# Patient Record
Sex: FEMALE | Race: WHITE | HISPANIC OR LATINO | Employment: OTHER | URBAN - METROPOLITAN AREA
[De-identification: names, ages, dates, MRNs, and addresses within clinical notes are randomized per-mention and may not be internally consistent; named-entity substitution may affect disease eponyms.]

---

## 2018-03-27 PROBLEM — G43.109 MIGRAINE WITH AURA AND WITHOUT STATUS MIGRAINOSUS, NOT INTRACTABLE: Status: ACTIVE | Noted: 2018-03-27

## 2018-05-01 PROBLEM — K20.0 EOSINOPHILIC ESOPHAGITIS: Status: ACTIVE | Noted: 2018-05-01

## 2018-05-10 PROBLEM — I95.9 SYMPTOMATIC HYPOTENSION: Status: RESOLVED | Noted: 2018-05-10 | Resolved: 2018-05-10

## 2018-05-10 PROBLEM — I95.0 IDIOPATHIC HYPOTENSION: Status: ACTIVE | Noted: 2018-05-10

## 2018-05-10 PROBLEM — H70.92 MASTOIDITIS OF LEFT SIDE: Status: ACTIVE | Noted: 2018-05-10

## 2018-05-10 PROBLEM — I95.9 SYMPTOMATIC HYPOTENSION: Status: ACTIVE | Noted: 2018-05-10

## 2018-05-10 PROBLEM — G45.1 TIA INVOLVING CAROTID ARTERY: Status: ACTIVE | Noted: 2018-05-10

## 2018-12-19 ENCOUNTER — OFFICE VISIT (OUTPATIENT)
Dept: OBSTETRICS AND GYNECOLOGY | Facility: CLINIC | Age: 63
End: 2018-12-19
Payer: MEDICARE

## 2018-12-19 VITALS
DIASTOLIC BLOOD PRESSURE: 94 MMHG | SYSTOLIC BLOOD PRESSURE: 168 MMHG | BODY MASS INDEX: 21.24 KG/M2 | HEIGHT: 70 IN | WEIGHT: 148.38 LBS

## 2018-12-19 DIAGNOSIS — R10.2 PELVIC PAIN: Primary | ICD-10-CM

## 2018-12-19 PROCEDURE — 99204 OFFICE O/P NEW MOD 45 MIN: CPT | Mod: S$GLB,,, | Performed by: SPECIALIST

## 2018-12-19 PROCEDURE — 99999 PR PBB SHADOW E&M-EST. PATIENT-LVL III: CPT | Mod: PBBFAC,,, | Performed by: SPECIALIST

## 2018-12-19 PROCEDURE — 3008F BODY MASS INDEX DOCD: CPT | Mod: CPTII,S$GLB,, | Performed by: SPECIALIST

## 2018-12-19 NOTE — PROGRESS NOTES
64 yo WF presents with complaint intermittent pelvic pain, left sided which she states has been present approx 2 years. Pt denies f/c, n/v, vaginal discharge, dysuria, flank pain, vaginal bleeding or spotting, melena, c/d.  Past Medical History:   Diagnosis Date    Abnormal bone density screening     Degenerative joint disease 02/05/2007    History of chicken pox     History of depression 02/05/2007       Past Surgical History:   Procedure Laterality Date    APPENDECTOMY      BREAST SURGERY Bilateral 2005    Implants    CHOLECYSTECTOMY  06/2012    DENTAL SURGERY      DILATION AND CURETTAGE OF UTERUS      HIP SURGERY Right 2007    TONSILLECTOMY      WRIST SURGERY Left 01/2014       Family History   Problem Relation Age of Onset    Diabetes Mother     Cancer Father     Throat cancer Father     Breast cancer Neg Hx     Ovarian cancer Neg Hx        Social History     Socioeconomic History    Marital status:      Spouse name: None    Number of children: None    Years of education: None    Highest education level: None   Social Needs    Financial resource strain: None    Food insecurity - worry: None    Food insecurity - inability: None    Transportation needs - medical: None    Transportation needs - non-medical: None   Occupational History    None   Tobacco Use    Smoking status: Never Smoker    Smokeless tobacco: Never Used   Substance and Sexual Activity    Alcohol use: Yes     Comment: occ    Drug use: No    Sexual activity: Yes     Birth control/protection: Post-menopausal   Other Topics Concern    None   Social History Narrative    None       Current Outpatient Medications   Medication Sig Dispense Refill    ALPRAZolam (XANAX) 0.25 MG tablet Take 0.25 mg by mouth as needed.      cyanocobalamin (VITAMIN B-12) 1,000 mcg/mL injection 1,000 mcg once a week.      ergocalciferol (ERGOCALCIFEROL) 50,000 unit Cap Take 1 capsule (50,000 Units total) by mouth every 7 days. 12  capsule 0    hydrocodone-acetaminophen 7.5-325mg (NORCO) 7.5-325 mg per tablet Take 1 tablet by mouth 3 (three) times daily.      prochlorperazine (COMPAZINE) 25 MG suppository Place 1 suppository (25 mg total) rectally every 12 (twelve) hours as needed for Nausea. 12 suppository 0    prochlorperazine (COMPAZINE) 5 MG tablet Take 5 mg by mouth every 6 (six) hours.      rizatriptan (MAXALT) 10 MG tablet Take 1 tablet (10 mg total) by mouth once as needed for Migraine. May repeat dose once after 2 hours 20 tablet 0    sod picosulf-mag ox-citric ac (CLENPIQ) 10 mg-3.5 gram -12 gram/160 mL Soln Clenpiq 10 mg-3.5 gram-12 gram/160 mL oral solution   take as directed      tiZANidine (ZANAFLEX) 4 MG tablet Take 4 mg by mouth 2 (two) times daily as needed.       No current facility-administered medications for this visit.        Review of patient's allergies indicates:  No Known Allergies    Review of System:   General: no chills, fever, night sweats, weight gain or weight loss  Psychological: no depression or suicidal ideation  Breasts: no new or changing breast lumps, nipple discharge or masses.  Respiratory: no cough, shortness of breath, or wheezing  Cardiovascular: no chest pain or dyspnea on exertion  Gastrointestinal: no abdominal pain, change in bowel habits, or black or bloody stools  Genito-Urinary: no incontinence, urinary frequency/urgency or vulvar/vaginal symptoms,  or abnormal vaginal bleeding. POSITIVE PELVIC PAIN  Musculoskeletal: no gait disturbance or muscular weakness                                              General Appearance    A and O x 4, Cooperative, no distress       Abdomen     Soft, non-tender, bowel sounds active all four quadrants,  no masses, no organomegaly    Genitourinary:   External rectal exam shows no thrombosed external hemorrhoids.   Pelvic exam was performed with patient supine.  No labial fusion.  There is no rash, lesion or injury on the right labia.  There is no rash,  lesion or injury on the left labia.  No bleeding and no signs of injury around the vaginal introitus, urethra is without lesions and well supported. The cervix is visualized with no discharge, lesions or friability.  No vaginal discharge found.  No significant Cystocele, Enterocele or rectocele, and uterus well supported.  Bimanual exam:  The urethra is normal to palpation and there are no palpable vaginal wall masses.  Uterus is not deviated, not enlarged, not fixed, normal shape and not tender.  Cervix exhibits no motion tenderness.   Right adnexum displays no mass and no tenderness.  Left adnexum fullness and tender to deep palp   Extremities: Extremities normal, atraumatic, no cyanosis or edema                     I discussed possible fibroid vs adnexal etiology and recommend a pelvic u/s  I will order and review  Further care plan pendingresults

## 2019-01-02 ENCOUNTER — HOSPITAL ENCOUNTER (OUTPATIENT)
Dept: RADIOLOGY | Facility: HOSPITAL | Age: 64
Discharge: HOME | End: 2019-01-02
Attending: SPECIALIST
Payer: MEDICARE

## 2019-01-02 DIAGNOSIS — R10.2 PELVIC PAIN: ICD-10-CM

## 2019-01-02 PROCEDURE — 76830 TRANSVAGINAL US NON-OB: CPT | Mod: TC,PN

## 2019-01-02 PROCEDURE — 76830 TRANSVAGINAL US NON-OB: CPT | Mod: 26,,, | Performed by: RADIOLOGY

## 2019-01-02 PROCEDURE — 76830 US PELVIS COMP WITH TRANSVAG NON-OB (XPD): ICD-10-PCS | Mod: 26,,, | Performed by: RADIOLOGY

## 2019-01-02 PROCEDURE — 76856 US PELVIS COMP WITH TRANSVAG NON-OB (XPD): ICD-10-PCS | Mod: 26,,, | Performed by: RADIOLOGY

## 2019-01-02 PROCEDURE — 76856 US EXAM PELVIC COMPLETE: CPT | Mod: 26,,, | Performed by: RADIOLOGY

## 2019-04-11 PROBLEM — M51.9 LUMBAR DISC DISEASE: Status: ACTIVE | Noted: 2019-04-11

## 2019-04-11 PROBLEM — T14.91XA SUICIDE ATTEMPT: Status: ACTIVE | Noted: 2019-04-11

## 2020-02-27 ENCOUNTER — APPOINTMENT (OUTPATIENT)
Dept: LAB | Facility: HOSPITAL | Age: 65
End: 2020-02-27
Attending: FAMILY MEDICINE
Payer: MEDICARE

## 2020-02-27 ENCOUNTER — CLINICAL SUPPORT (OUTPATIENT)
Dept: FAMILY MEDICINE | Facility: CLINIC | Age: 65
End: 2020-02-27
Payer: MEDICARE

## 2020-02-27 DIAGNOSIS — N17.9 AKI (ACUTE KIDNEY INJURY): ICD-10-CM

## 2020-02-27 DIAGNOSIS — K52.9 COLITIS: ICD-10-CM

## 2020-02-27 DIAGNOSIS — Z13.6 ENCOUNTER FOR LIPID SCREENING FOR CARDIOVASCULAR DISEASE: ICD-10-CM

## 2020-02-27 DIAGNOSIS — Z13.220 ENCOUNTER FOR LIPID SCREENING FOR CARDIOVASCULAR DISEASE: ICD-10-CM

## 2020-02-27 LAB
BASOPHILS # BLD AUTO: 0.04 K/UL (ref 0–0.2)
BASOPHILS NFR BLD: 0.5 % (ref 0–1.9)
DIFFERENTIAL METHOD: ABNORMAL
EOSINOPHIL # BLD AUTO: 0.2 K/UL (ref 0–0.5)
EOSINOPHIL NFR BLD: 2.6 % (ref 0–8)
ERYTHROCYTE [DISTWIDTH] IN BLOOD BY AUTOMATED COUNT: 15 % (ref 11.5–14.5)
HCT VFR BLD AUTO: 42.2 % (ref 37–48.5)
HGB BLD-MCNC: 13 G/DL (ref 12–16)
IMM GRANULOCYTES # BLD AUTO: 0.09 K/UL (ref 0–0.04)
LYMPHOCYTES # BLD AUTO: 2.3 K/UL (ref 1–4.8)
LYMPHOCYTES NFR BLD: 27 % (ref 18–48)
MCH RBC QN AUTO: 27.4 PG (ref 27–31)
MCHC RBC AUTO-ENTMCNC: 30.8 G/DL (ref 32–36)
MCV RBC AUTO: 89 FL (ref 82–98)
MONOCYTES # BLD AUTO: 0.4 K/UL (ref 0.3–1)
MONOCYTES NFR BLD: 5.2 % (ref 4–15)
NEUTROPHILS # BLD AUTO: 5.4 K/UL (ref 1.8–7.7)
NEUTROPHILS NFR BLD: 63.6 % (ref 38–73)
NRBC BLD-RTO: 0 /100 WBC
PLATELET # BLD AUTO: 419 K/UL (ref 150–350)
PMV BLD AUTO: 10.1 FL (ref 9.2–12.9)
RBC # BLD AUTO: 4.75 M/UL (ref 4–5.4)
WBC # BLD AUTO: 8.54 K/UL (ref 3.9–12.7)

## 2020-02-27 PROCEDURE — 80061 LIPID PANEL: CPT

## 2020-02-27 PROCEDURE — 80048 BASIC METABOLIC PNL TOTAL CA: CPT

## 2020-02-27 PROCEDURE — 85025 COMPLETE CBC W/AUTO DIFF WBC: CPT

## 2020-02-27 PROCEDURE — 36415 COLL VENOUS BLD VENIPUNCTURE: CPT

## 2020-02-27 NOTE — PROGRESS NOTES
Venipuncture performed with 21 gauge butterfly, x's 1 attempt,  to R Antecubital vein.  Specimens collected per orders.      Pressure dressing applied to site, instructed patient to remove dressing in 10-15 minutes, OK to re-adjust dressing if pressure causing any discomfort, to observe closely for numbness and/or discoloration to hand or fingers, and to notify provider if bleeding persists after applying constant pressure lasting 30 minutes.

## 2020-02-28 LAB
ANION GAP SERPL CALC-SCNC: 11 MMOL/L (ref 8–16)
BUN SERPL-MCNC: 7 MG/DL (ref 8–23)
CALCIUM SERPL-MCNC: 8.8 MG/DL (ref 8.7–10.5)
CHLORIDE SERPL-SCNC: 105 MMOL/L (ref 95–110)
CHOLEST SERPL-MCNC: 175 MG/DL (ref 120–199)
CHOLEST/HDLC SERPL: 4.2 {RATIO} (ref 2–5)
CO2 SERPL-SCNC: 27 MMOL/L (ref 23–29)
CREAT SERPL-MCNC: 0.9 MG/DL (ref 0.5–1.4)
EST. GFR  (AFRICAN AMERICAN): >60 ML/MIN/1.73 M^2
EST. GFR  (NON AFRICAN AMERICAN): >60 ML/MIN/1.73 M^2
GLUCOSE SERPL-MCNC: 78 MG/DL (ref 70–110)
HDLC SERPL-MCNC: 42 MG/DL (ref 40–75)
HDLC SERPL: 24 % (ref 20–50)
LDLC SERPL CALC-MCNC: 105.2 MG/DL (ref 63–159)
NONHDLC SERPL-MCNC: 133 MG/DL
POTASSIUM SERPL-SCNC: 4 MMOL/L (ref 3.5–5.1)
SODIUM SERPL-SCNC: 143 MMOL/L (ref 136–145)
TRIGL SERPL-MCNC: 139 MG/DL (ref 30–150)

## 2020-11-27 PROBLEM — I95.9 SYMPTOMATIC HYPOTENSION: Chronic | Status: RESOLVED | Noted: 2018-05-10 | Resolved: 2018-05-10

## 2021-05-10 ENCOUNTER — PATIENT MESSAGE (OUTPATIENT)
Dept: RESEARCH | Facility: HOSPITAL | Age: 66
End: 2021-05-10

## 2021-11-12 ENCOUNTER — CLINICAL SUPPORT (OUTPATIENT)
Dept: AUDIOLOGY | Facility: CLINIC | Age: 66
End: 2021-11-12
Payer: MEDICARE

## 2021-11-12 ENCOUNTER — OFFICE VISIT (OUTPATIENT)
Dept: OTOLARYNGOLOGY | Facility: CLINIC | Age: 66
End: 2021-11-12
Payer: MEDICARE

## 2021-11-12 VITALS — HEIGHT: 70 IN | WEIGHT: 162.94 LBS | BODY MASS INDEX: 23.33 KG/M2

## 2021-11-12 DIAGNOSIS — H90.A21 SENSORINEURAL HEARING LOSS (SNHL) OF RIGHT EAR WITH RESTRICTED HEARING OF LEFT EAR: Primary | ICD-10-CM

## 2021-11-12 DIAGNOSIS — R51.9 LEFT-SIDED HEADACHE: ICD-10-CM

## 2021-11-12 DIAGNOSIS — H93.A2 PULSATILE TINNITUS, LEFT EAR: ICD-10-CM

## 2021-11-12 DIAGNOSIS — H90.A32 MIXED CONDUCTIVE AND SENSORINEURAL HEARING LOSS OF LEFT EAR WITH RESTRICTED HEARING OF RIGHT EAR: ICD-10-CM

## 2021-11-12 DIAGNOSIS — H90.3 BILATERAL HIGH FREQUENCY SENSORINEURAL HEARING LOSS: ICD-10-CM

## 2021-11-12 DIAGNOSIS — J30.0 VMR (VASOMOTOR RHINITIS): Primary | ICD-10-CM

## 2021-11-12 DIAGNOSIS — H74.8X2 TYPE B TYMPANOGRAM, LEFT: ICD-10-CM

## 2021-11-12 DIAGNOSIS — H93.A2 PULSATILE TINNITUS OF LEFT EAR: ICD-10-CM

## 2021-11-12 PROCEDURE — 92567 PR TYMPA2METRY: ICD-10-PCS | Mod: S$GLB,,, | Performed by: AUDIOLOGIST

## 2021-11-12 PROCEDURE — 3008F PR BODY MASS INDEX (BMI) DOCUMENTED: ICD-10-PCS | Mod: CPTII,S$GLB,, | Performed by: NURSE PRACTITIONER

## 2021-11-12 PROCEDURE — 3288F FALL RISK ASSESSMENT DOCD: CPT | Mod: CPTII,S$GLB,, | Performed by: NURSE PRACTITIONER

## 2021-11-12 PROCEDURE — 1101F PR PT FALLS ASSESS DOC 0-1 FALLS W/OUT INJ PAST YR: ICD-10-PCS | Mod: CPTII,S$GLB,, | Performed by: NURSE PRACTITIONER

## 2021-11-12 PROCEDURE — 99203 PR OFFICE/OUTPT VISIT, NEW, LEVL III, 30-44 MIN: ICD-10-PCS | Mod: S$GLB,,, | Performed by: NURSE PRACTITIONER

## 2021-11-12 PROCEDURE — 1126F AMNT PAIN NOTED NONE PRSNT: CPT | Mod: CPTII,S$GLB,, | Performed by: NURSE PRACTITIONER

## 2021-11-12 PROCEDURE — 1126F PR PAIN SEVERITY QUANTIFIED, NO PAIN PRESENT: ICD-10-PCS | Mod: CPTII,S$GLB,, | Performed by: NURSE PRACTITIONER

## 2021-11-12 PROCEDURE — 92557 COMPREHENSIVE HEARING TEST: CPT | Mod: S$GLB,,, | Performed by: AUDIOLOGIST

## 2021-11-12 PROCEDURE — 1159F PR MEDICATION LIST DOCUMENTED IN MEDICAL RECORD: ICD-10-PCS | Mod: CPTII,S$GLB,, | Performed by: NURSE PRACTITIONER

## 2021-11-12 PROCEDURE — 1101F PT FALLS ASSESS-DOCD LE1/YR: CPT | Mod: CPTII,S$GLB,, | Performed by: NURSE PRACTITIONER

## 2021-11-12 PROCEDURE — 3008F BODY MASS INDEX DOCD: CPT | Mod: CPTII,S$GLB,, | Performed by: NURSE PRACTITIONER

## 2021-11-12 PROCEDURE — 99203 OFFICE O/P NEW LOW 30 MIN: CPT | Mod: S$GLB,,, | Performed by: NURSE PRACTITIONER

## 2021-11-12 PROCEDURE — 92557 PR COMPREHENSIVE HEARING TEST: ICD-10-PCS | Mod: S$GLB,,, | Performed by: AUDIOLOGIST

## 2021-11-12 PROCEDURE — 3288F PR FALLS RISK ASSESSMENT DOCUMENTED: ICD-10-PCS | Mod: CPTII,S$GLB,, | Performed by: NURSE PRACTITIONER

## 2021-11-12 PROCEDURE — 99999 PR PBB SHADOW E&M-EST. PATIENT-LVL III: ICD-10-PCS | Mod: PBBFAC,,, | Performed by: NURSE PRACTITIONER

## 2021-11-12 PROCEDURE — 1159F MED LIST DOCD IN RCRD: CPT | Mod: CPTII,S$GLB,, | Performed by: NURSE PRACTITIONER

## 2021-11-12 PROCEDURE — 99999 PR PBB SHADOW E&M-EST. PATIENT-LVL III: CPT | Mod: PBBFAC,,, | Performed by: NURSE PRACTITIONER

## 2021-11-12 PROCEDURE — 92567 TYMPANOMETRY: CPT | Mod: S$GLB,,, | Performed by: AUDIOLOGIST

## 2021-11-12 PROCEDURE — 99999 PR PBB SHADOW E&M-EST. PATIENT-LVL I: ICD-10-PCS | Mod: PBBFAC,,,

## 2021-11-12 PROCEDURE — 99999 PR PBB SHADOW E&M-EST. PATIENT-LVL I: CPT | Mod: PBBFAC,,,

## 2021-11-12 RX ORDER — IPRATROPIUM BROMIDE 42 UG/1
1 SPRAY, METERED NASAL 3 TIMES DAILY
Qty: 15 ML | Refills: 12 | Status: SHIPPED | OUTPATIENT
Start: 2021-11-12 | End: 2022-08-26

## 2021-11-12 RX ORDER — DIPHENHYDRAMINE HCL 12.5MG/5ML
ELIXIR ORAL 4 TIMES DAILY PRN
COMMUNITY
End: 2022-08-26

## 2022-10-05 ENCOUNTER — TELEPHONE (OUTPATIENT)
Dept: OTOLARYNGOLOGY | Facility: CLINIC | Age: 67
End: 2022-10-05

## 2022-10-05 ENCOUNTER — TELEPHONE (OUTPATIENT)
Dept: OTOLARYNGOLOGY | Facility: CLINIC | Age: 67
End: 2022-10-05
Payer: MEDICARE

## 2022-10-05 NOTE — TELEPHONE ENCOUNTER
----- Message from Emiliana Law sent at 10/5/2022 10:39 AM CDT -----  Regarding: pt call back  Name of Who is Calling: BEBE ANDERSON [[id number]]      What is the request in detail: Pt is requesting an appt sometime in October for a runny nose on one side. Also experiencing ear discomfort         Can the clinic reply by [facility]: NO        What Number to Call Back if not in [facility]: [phone]

## 2022-10-05 NOTE — TELEPHONE ENCOUNTER
LMOV for pt to call for scheduling with any ENT provider. Informed any  can schedule next available appt.

## 2022-10-05 NOTE — TELEPHONE ENCOUNTER
----- Message from Ai Rowe sent at 10/5/2022 12:29 PM CDT -----  Type:  Patient Returning Call    Who Called: pt     Who Left Message for Patient:Christie Dwyer LPN    Does the patient know what this is regarding?:yes    Would the patient rather a call back or a response via [facility]?  Call back     Best Call Back Number:[phone] (mobile)     Additional Information:

## 2022-12-02 ENCOUNTER — OFFICE VISIT (OUTPATIENT)
Dept: OTOLARYNGOLOGY | Facility: CLINIC | Age: 67
End: 2022-12-02
Payer: MEDICARE

## 2022-12-02 VITALS — TEMPERATURE: 99 F | WEIGHT: 169.75 LBS | BODY MASS INDEX: 24.3 KG/M2 | HEIGHT: 70 IN

## 2022-12-02 DIAGNOSIS — H65.22 CHRONIC SEROUS OTITIS MEDIA OF LEFT EAR: ICD-10-CM

## 2022-12-02 DIAGNOSIS — R51.9 LEFT-SIDED HEADACHE: ICD-10-CM

## 2022-12-02 DIAGNOSIS — H69.92 CHRONIC EUSTACHIAN TUBE DYSFUNCTION, LEFT: ICD-10-CM

## 2022-12-02 DIAGNOSIS — H61.23 BILATERAL IMPACTED CERUMEN: ICD-10-CM

## 2022-12-02 DIAGNOSIS — J30.0 CHRONIC VASOMOTOR RHINITIS: Primary | ICD-10-CM

## 2022-12-02 PROCEDURE — 1101F PR PT FALLS ASSESS DOC 0-1 FALLS W/OUT INJ PAST YR: ICD-10-PCS | Mod: CPTII,S$GLB,, | Performed by: NURSE PRACTITIONER

## 2022-12-02 PROCEDURE — 1159F PR MEDICATION LIST DOCUMENTED IN MEDICAL RECORD: ICD-10-PCS | Mod: CPTII,S$GLB,, | Performed by: NURSE PRACTITIONER

## 2022-12-02 PROCEDURE — 92511 NASOPHARYNGOSCOPY: CPT | Mod: S$GLB,,, | Performed by: NURSE PRACTITIONER

## 2022-12-02 PROCEDURE — 1125F PR PAIN SEVERITY QUANTIFIED, PAIN PRESENT: ICD-10-PCS | Mod: CPTII,S$GLB,, | Performed by: NURSE PRACTITIONER

## 2022-12-02 PROCEDURE — 3288F FALL RISK ASSESSMENT DOCD: CPT | Mod: CPTII,S$GLB,, | Performed by: NURSE PRACTITIONER

## 2022-12-02 PROCEDURE — 3288F PR FALLS RISK ASSESSMENT DOCUMENTED: ICD-10-PCS | Mod: CPTII,S$GLB,, | Performed by: NURSE PRACTITIONER

## 2022-12-02 PROCEDURE — 3008F BODY MASS INDEX DOCD: CPT | Mod: CPTII,S$GLB,, | Performed by: NURSE PRACTITIONER

## 2022-12-02 PROCEDURE — 69210 REMOVE IMPACTED EAR WAX UNI: CPT | Mod: S$GLB,,, | Performed by: NURSE PRACTITIONER

## 2022-12-02 PROCEDURE — 99999 PR PBB SHADOW E&M-EST. PATIENT-LVL III: ICD-10-PCS | Mod: PBBFAC,,, | Performed by: NURSE PRACTITIONER

## 2022-12-02 PROCEDURE — 3008F PR BODY MASS INDEX (BMI) DOCUMENTED: ICD-10-PCS | Mod: CPTII,S$GLB,, | Performed by: NURSE PRACTITIONER

## 2022-12-02 PROCEDURE — 99214 OFFICE O/P EST MOD 30 MIN: CPT | Mod: 25,S$GLB,, | Performed by: NURSE PRACTITIONER

## 2022-12-02 PROCEDURE — 1125F AMNT PAIN NOTED PAIN PRSNT: CPT | Mod: CPTII,S$GLB,, | Performed by: NURSE PRACTITIONER

## 2022-12-02 PROCEDURE — 69210 PR REMOVAL IMPACTED CERUMEN REQUIRING INSTRUMENTATION, UNILATERAL: ICD-10-PCS | Mod: S$GLB,,, | Performed by: NURSE PRACTITIONER

## 2022-12-02 PROCEDURE — 99214 PR OFFICE/OUTPT VISIT, EST, LEVL IV, 30-39 MIN: ICD-10-PCS | Mod: 25,S$GLB,, | Performed by: NURSE PRACTITIONER

## 2022-12-02 PROCEDURE — 1159F MED LIST DOCD IN RCRD: CPT | Mod: CPTII,S$GLB,, | Performed by: NURSE PRACTITIONER

## 2022-12-02 PROCEDURE — 99999 PR PBB SHADOW E&M-EST. PATIENT-LVL III: CPT | Mod: PBBFAC,,, | Performed by: NURSE PRACTITIONER

## 2022-12-02 PROCEDURE — 92511 PR NASOPHARYNGOSCOPY: ICD-10-PCS | Mod: S$GLB,,, | Performed by: NURSE PRACTITIONER

## 2022-12-02 PROCEDURE — 1101F PT FALLS ASSESS-DOCD LE1/YR: CPT | Mod: CPTII,S$GLB,, | Performed by: NURSE PRACTITIONER

## 2022-12-02 RX ORDER — IPRATROPIUM BROMIDE 42 UG/1
2 SPRAY, METERED NASAL 3 TIMES DAILY
Qty: 15 ML | Refills: 12 | Status: SHIPPED | OUTPATIENT
Start: 2022-12-02 | End: 2023-05-23

## 2022-12-02 NOTE — PROGRESS NOTES
Subjective:       Patient ID: Tona Zafar is a 67 y.o. female.    Chief Complaint: No chief complaint on file.    HPI  Patient saw me one year ago for multiple ENT concerns. Patient returns today for chronic rhinitis and left ear concerns. Patient saw her PCP for this 3 months ago; given Amoxicillin and Flonase.  Patient reports chronic left-sided clear watery rhinorrhea, profuse at times.  No nasal sprays have helped.  Patient also reports gradually losing her hearing in the left ear.  Progressively worsening.  Stays clogged all the time.  Occasional left otalgia and left-sided headaches.  Nasal valsalva helps improve left hearing but only temporarily, then closes back up again.     Review of Systems   Constitutional: Negative.    HENT:  Positive for hearing loss and rhinorrhea.    Eyes: Negative.    Respiratory: Negative.     Cardiovascular: Negative.    Gastrointestinal: Negative.    Musculoskeletal: Negative.    Integumentary:  Negative.   Neurological:  Positive for headaches.   Hematological: Negative.    Psychiatric/Behavioral: Negative.         Objective:      Physical Exam  Vitals and nursing note reviewed.   Constitutional:       General: She is not in acute distress.     Appearance: She is well-developed. She is not ill-appearing or diaphoretic.   HENT:      Head: Normocephalic and atraumatic.      Right Ear: Hearing, tympanic membrane, ear canal and external ear normal. No middle ear effusion. Tympanic membrane is not erythematous.      Left Ear: Hearing, ear canal and external ear normal. A middle ear effusion is present. Tympanic membrane is not erythematous.      Nose: Nose normal. No mucosal edema, congestion or rhinorrhea.      Right Sinus: No maxillary sinus tenderness or frontal sinus tenderness.      Left Sinus: No maxillary sinus tenderness or frontal sinus tenderness.      Mouth/Throat:      Mouth: Mucous membranes are not pale, not dry and not cyanotic. No oral lesions.      Tongue: No  lesions.      Palate: No lesions.      Pharynx: Uvula midline. No oropharyngeal exudate or posterior oropharyngeal erythema.   Eyes:      General: Lids are normal. No scleral icterus.        Right eye: No discharge.         Left eye: No discharge.   Neck:      Thyroid: No thyroid mass or thyromegaly.      Trachea: Trachea normal. No tracheal deviation.   Cardiovascular:      Rate and Rhythm: Normal rate.   Pulmonary:      Effort: Pulmonary effort is normal. No respiratory distress.      Breath sounds: Normal air entry.   Musculoskeletal:         General: Normal range of motion.      Cervical back: Normal range of motion and neck supple.   Lymphadenopathy:      Head:      Right side of head: No submental, submandibular, tonsillar, preauricular or posterior auricular adenopathy.      Left side of head: No submental, submandibular, tonsillar, preauricular or posterior auricular adenopathy.      Cervical: No cervical adenopathy.      Right cervical: No superficial or posterior cervical adenopathy.     Left cervical: No superficial or posterior cervical adenopathy.   Skin:     General: Skin is warm and dry.      Coloration: Skin is not pale.      Findings: No lesion or rash.   Neurological:      Mental Status: She is alert and oriented to person, place, and time.      Motor: Motor function is intact.      Coordination: Coordination is intact.      Gait: Gait normal.   Psychiatric:         Attention and Perception: Attention normal.         Mood and Affect: Mood normal.         Speech: Speech normal.         Behavior: Behavior normal. Behavior is cooperative.       SEPARATE PROCEDURE NOTE:  After verbal consent obtained, bilaterally nasal cavities sprayed with phenylephrine and xylocaine. Flexible fiberoptic nasoendoscopy carried out and findings were:  Right & Left Eustachian Tube Orifice,  Right and Left Base of Tongue      SEPARATE PROCEDURE IN OFFICE:   Procedure: Removal of impacted cerumen, bilateral   Pre Procedure  Diagnosis: Cerumen Impaction   Post Procedure Diagnosis: Cerumen Impaction   Verbal informed consent in regards to risk of trauma to ear canal, ear drum or hearing, discomfort during procedure and/or inability to remove cerumen impaction in one session or unforeseen events or complications.   No anesthesia.     Procedure in detail:   Ear canal visualized bilateral with appropriate size ear speculum utilizing Operating Head Binocular Otomicroscope   Utilizing the following:  Ring curet, delicate alligator forceps, and/or suction cannula was used. The impacted cerumen of the ear canals was removed atraumatically. The TM and EAC were then inspected and found to be clear of wax. See description of TMs/EACs in PE above.   Complications: No   Condition: Improved/Good    Assessment:       Problem List Items Addressed This Visit    None  Visit Diagnoses       Chronic vasomotor rhinitis    -  Primary    Relevant Medications    ipratropium (ATROVENT) 42 mcg (0.06 %) nasal spray    Left-sided headache        Chronic serous otitis media of left ear        Chronic Eustachian tube dysfunction, left                Plan:     Atrovent nasal spray. If not helpful, perhaps consideration of RhinAer procedure on left side only, may be in office. Would need consultation with Dr. Lim.     Nasal valsalva. Patient has had left GRANT for over a year. Likely needs PET AS. To discuss with Dr. Lim (audiogram prior).

## 2023-02-07 ENCOUNTER — OFFICE VISIT (OUTPATIENT)
Dept: OTOLARYNGOLOGY | Facility: CLINIC | Age: 68
End: 2023-02-07
Payer: MEDICARE

## 2023-02-07 ENCOUNTER — TELEPHONE (OUTPATIENT)
Dept: OTOLARYNGOLOGY | Facility: CLINIC | Age: 68
End: 2023-02-07

## 2023-02-07 VITALS — HEIGHT: 70 IN | BODY MASS INDEX: 25.21 KG/M2 | WEIGHT: 176.13 LBS

## 2023-02-07 DIAGNOSIS — J30.0 CHRONIC VASOMOTOR RHINITIS: ICD-10-CM

## 2023-02-07 DIAGNOSIS — H65.22 CHRONIC SEROUS OTITIS MEDIA OF LEFT EAR: ICD-10-CM

## 2023-02-07 DIAGNOSIS — R51.9 LEFT-SIDED HEADACHE: ICD-10-CM

## 2023-02-07 DIAGNOSIS — J34.89 RHINORRHEA: Primary | ICD-10-CM

## 2023-02-07 PROCEDURE — 99999 PR PBB SHADOW E&M-EST. PATIENT-LVL III: CPT | Mod: PBBFAC,,, | Performed by: OTOLARYNGOLOGY

## 2023-02-07 PROCEDURE — 1101F PR PT FALLS ASSESS DOC 0-1 FALLS W/OUT INJ PAST YR: ICD-10-PCS | Mod: CPTII,S$GLB,, | Performed by: OTOLARYNGOLOGY

## 2023-02-07 PROCEDURE — 99214 OFFICE O/P EST MOD 30 MIN: CPT | Mod: S$GLB,,, | Performed by: OTOLARYNGOLOGY

## 2023-02-07 PROCEDURE — 3008F BODY MASS INDEX DOCD: CPT | Mod: CPTII,S$GLB,, | Performed by: OTOLARYNGOLOGY

## 2023-02-07 PROCEDURE — 3288F PR FALLS RISK ASSESSMENT DOCUMENTED: ICD-10-PCS | Mod: CPTII,S$GLB,, | Performed by: OTOLARYNGOLOGY

## 2023-02-07 PROCEDURE — 1101F PT FALLS ASSESS-DOCD LE1/YR: CPT | Mod: CPTII,S$GLB,, | Performed by: OTOLARYNGOLOGY

## 2023-02-07 PROCEDURE — 99999 PR PBB SHADOW E&M-EST. PATIENT-LVL III: ICD-10-PCS | Mod: PBBFAC,,, | Performed by: OTOLARYNGOLOGY

## 2023-02-07 PROCEDURE — 1159F MED LIST DOCD IN RCRD: CPT | Mod: CPTII,S$GLB,, | Performed by: OTOLARYNGOLOGY

## 2023-02-07 PROCEDURE — 3008F PR BODY MASS INDEX (BMI) DOCUMENTED: ICD-10-PCS | Mod: CPTII,S$GLB,, | Performed by: OTOLARYNGOLOGY

## 2023-02-07 PROCEDURE — 1126F AMNT PAIN NOTED NONE PRSNT: CPT | Mod: CPTII,S$GLB,, | Performed by: OTOLARYNGOLOGY

## 2023-02-07 PROCEDURE — 1159F PR MEDICATION LIST DOCUMENTED IN MEDICAL RECORD: ICD-10-PCS | Mod: CPTII,S$GLB,, | Performed by: OTOLARYNGOLOGY

## 2023-02-07 PROCEDURE — 3288F FALL RISK ASSESSMENT DOCD: CPT | Mod: CPTII,S$GLB,, | Performed by: OTOLARYNGOLOGY

## 2023-02-07 PROCEDURE — 99214 PR OFFICE/OUTPT VISIT, EST, LEVL IV, 30-39 MIN: ICD-10-PCS | Mod: S$GLB,,, | Performed by: OTOLARYNGOLOGY

## 2023-02-07 PROCEDURE — 1126F PR PAIN SEVERITY QUANTIFIED, NO PAIN PRESENT: ICD-10-PCS | Mod: CPTII,S$GLB,, | Performed by: OTOLARYNGOLOGY

## 2023-02-07 RX ORDER — METHYLPREDNISOLONE 4 MG/1
TABLET ORAL
COMMUNITY
Start: 2023-02-01 | End: 2023-05-19

## 2023-02-07 NOTE — TELEPHONE ENCOUNTER
----- Message from Malgorzata Varela sent at 2/7/2023  3:32 PM CST -----  Contact: self  Type:  Patient Returning Call    Who Called:  patient  Who Left Message for Patient:  [place]  Does the patient know what this is regarding?:  vicky MRI of the brain but she has a question  Best Call Back Number: [phone]  Additional Information:  thanks

## 2023-02-07 NOTE — PROGRESS NOTES
Subjective:       Patient ID: Tona Zafar is a 68 y.o. female.    Chief Complaint: Consult (For tube in L ear / rhinaer procedure )    Tona is here for follow-up of chronic serous OM, AS.   Last seen by marie 12/2022. NP scope last visit normal.  Has chronic left sided rhinorrhea for about a year. She reports the rhinorrhea will worsen with activity. Atrovent with no change.   No Tbone or sinus imaging.  CT head 2018 with L mastoid opacification.    HPI 12/2022:  Patient saw her PCP for this 3 months ago; given Amoxicillin and Flonase.  Patient reports chronic left-sided clear watery rhinorrhea, profuse at times.  No nasal sprays have helped.  Patient also reports gradually losing her hearing in the left ear.  Progressively worsening.  Stays clogged all the time.  Occasional left otalgia and left-sided headaches.  Nasal valsalva helps improve left hearing but only temporarily, then closes back up again.     Patient validated questionnaires (if applicable):      %       No flowsheet data found.  No flowsheet data found.  No flowsheet data found.         Review of Systems   Constitutional: Negative for activity change and appetite change.   Respiratory: Negative for difficulty breathing and wheezing   Cardiovascular: Negative for chest pain.      Objective:        Constitutional:   Vital signs are normal. She appears well-developed and well-nourished.     Head:  Normocephalic and atraumatic.     Ears:  Hearing normal to normal and whispered voice; external ear normal without scars, lesions, or masses; ear canal, tympanic membrane, and middle ear normal..   Left Ear: Tympanic membrane is injected. A middle ear effusion is present.     Nose:  Nose normal including turbinates, nasal mucosa, sinuses and nasal septum. Septal deviation (moderate left anterior, able to see middle meatus and middle turbinate) present.     Mouth/Throat  Oropharynx clear and moist without lesions or asymmetry.     Neck:  Neck normal  without thyromegaly masses, asymmetry, normal tracheal structure, crepitus, and tenderness.       Tests / Results:        Assessment:       1. Rhinorrhea    2. Chronic serous otitis media of left ear    3. Left-sided headache    4. Chronic vasomotor rhinitis          Plan:         Discussed rhinorhea, may be vasomotor, but due to unilateral nature and coexisting serous effusion with some headaches, Recommend collecting rhinorrhea and test for beta 2. Also obtaining CT Temporal Bone. May need sinus imaging pending results of above.    Can consider tympanostomy tube and RhinAer in the future if testing negative. Discussed results can be lower when nonresponder to Atrovent.

## 2023-02-22 ENCOUNTER — TELEPHONE (OUTPATIENT)
Dept: OTOLARYNGOLOGY | Facility: CLINIC | Age: 68
End: 2023-02-22
Payer: MEDICARE

## 2023-02-22 NOTE — TELEPHONE ENCOUNTER
----- Message from Melissa Akers sent at 2/22/2023  2:07 PM CST -----  .Type:  Patient Call Back    Who Called: PT       Does the patient know what this is regarding?: BRAIN SCAN     Would the patient rather a call back YES     Best Call Back Number: [phone]    Additional Information: Thank You

## 2023-03-08 ENCOUNTER — HOSPITAL ENCOUNTER (OUTPATIENT)
Dept: RADIOLOGY | Facility: HOSPITAL | Age: 68
Discharge: HOME | End: 2023-03-08
Attending: OTOLARYNGOLOGY
Payer: MEDICARE

## 2023-03-08 ENCOUNTER — TELEPHONE (OUTPATIENT)
Dept: OTOLARYNGOLOGY | Facility: CLINIC | Age: 68
End: 2023-03-08
Payer: MEDICARE

## 2023-03-08 DIAGNOSIS — R51.9 LEFT-SIDED HEADACHE: ICD-10-CM

## 2023-03-08 DIAGNOSIS — H65.22 CHRONIC SEROUS OTITIS MEDIA OF LEFT EAR: ICD-10-CM

## 2023-03-08 DIAGNOSIS — J34.89 RHINORRHEA: ICD-10-CM

## 2023-03-08 PROCEDURE — 70480 CT ORBIT/EAR/FOSSA W/O DYE: CPT | Mod: TC,PO

## 2023-03-08 PROCEDURE — 70480 CT ORBIT/EAR/FOSSA W/O DYE: CPT | Mod: 26,,, | Performed by: RADIOLOGY

## 2023-03-08 PROCEDURE — 70480 CT TEMPORAL BONE WITHOUT CONTRAST: ICD-10-PCS | Mod: 26,,, | Performed by: RADIOLOGY

## 2023-03-08 NOTE — TELEPHONE ENCOUNTER
----- Message from Arden Lim MD sent at 3/8/2023  4:11 PM CST -----  Called and left voicemail to call back.     Tona,  I called and left a voicemail.    Your CT shows fluid accumulation in the left ear that I suspect is related to your nasal drainage. Have you been able to collect the fluid for evaluation?     Arden Lim MD  Otolaryngology - Head and Neck Surgery  Office: [phone]  Cell: [phone]  Fax: [phone]    This message was generated using voice dictation. Please excuse any errors that may have been created by the transcription software.

## 2023-03-08 NOTE — TELEPHONE ENCOUNTER
S/w pt and relayed results. Pt stated that she has been having trouble collecting fluid. She said that she will try a little harder to collect it. Her  said that since she did a CT of her head if you see anything wrong.     Please advise

## 2023-03-13 ENCOUNTER — TELEPHONE (OUTPATIENT)
Dept: OTOLARYNGOLOGY | Facility: CLINIC | Age: 68
End: 2023-03-13
Payer: MEDICARE

## 2023-03-13 NOTE — TELEPHONE ENCOUNTER
----- Message from Bean Meehan sent at 3/13/2023  2:50 PM CDT -----  Type: Needs Medical Advice  Who Called:  Patient    Best Call Back Number: [phone]  Additional Information: Patient states that she would like a callback regarding questions about her labs.         Orthopedics

## 2023-03-13 NOTE — TELEPHONE ENCOUNTER
----- Message from Anson Castaneda sent at 3/13/2023  3:26 PM CDT -----      Name of Who is Calling:PT          What is the request in detail:PT is returning a call placed by the office to her, she knows what the call is in reference to but did not want to share with me. Please be Advised!          Can the clinic reply by [facility]:no          What Number to Call Back if not in [phone]

## 2023-03-14 ENCOUNTER — LAB VISIT (OUTPATIENT)
Dept: LAB | Facility: HOSPITAL | Age: 68
End: 2023-03-14
Attending: OTOLARYNGOLOGY
Payer: MEDICARE

## 2023-03-14 DIAGNOSIS — J34.89 RHINORRHEA: ICD-10-CM

## 2023-03-14 DIAGNOSIS — R51.9 LEFT-SIDED HEADACHE: ICD-10-CM

## 2023-03-14 DIAGNOSIS — H65.22 CHRONIC SEROUS OTITIS MEDIA OF LEFT EAR: ICD-10-CM

## 2023-03-14 PROCEDURE — 86335 IMMUNFIX E-PHORSIS/URINE/CSF: CPT | Performed by: OTOLARYNGOLOGY

## 2023-03-16 ENCOUNTER — TELEPHONE (OUTPATIENT)
Dept: OTOLARYNGOLOGY | Facility: CLINIC | Age: 68
End: 2023-03-16
Payer: MEDICARE

## 2023-03-16 LAB — B2 TRANSFERRIN FLD QL: POSITIVE

## 2023-03-16 NOTE — TELEPHONE ENCOUNTER
----- Message from Ana Leiva sent at 3/16/2023  8:45 AM CDT -----  Contact: pt  Type: Needs Medical Advice  Who Called:  pt     Best Call Back Number: [phone]    Additional Information: pt states would like to speak with a nurse regarding some symptoms she is having. Please advise.

## 2023-03-16 NOTE — TELEPHONE ENCOUNTER
S/w pt and advised that Dr. Lim will call when results are back, pt verbalized understanding.   Pt wanted to let Dr. Lim know that she does not feel well today and running fever. Advised pt to contact PCP or U/C to be evaluated for her symptoms.

## 2023-03-17 DIAGNOSIS — G96.01 CSF LEAK FROM EAR: Primary | ICD-10-CM

## 2023-03-20 ENCOUNTER — TELEPHONE (OUTPATIENT)
Dept: OTOLARYNGOLOGY | Facility: CLINIC | Age: 68
End: 2023-03-20
Payer: MEDICARE

## 2023-03-20 DIAGNOSIS — G96.01 CSF LEAK FROM EAR: ICD-10-CM

## 2023-03-20 DIAGNOSIS — H65.22 CHRONIC SEROUS OTITIS MEDIA OF LEFT EAR: Primary | ICD-10-CM

## 2023-03-20 NOTE — TELEPHONE ENCOUNTER
----- Message from Arden Lim MD sent at 3/20/2023  4:18 PM CDT -----  Please schedule MRI IAC prior to visit with Dr. Griffith

## 2023-03-20 NOTE — TELEPHONE ENCOUNTER
Please contact pt to scheduled with  or Ced moe, CSF leak. MRI scheduled for 4/3/23. Thanks   negative...

## 2023-03-21 ENCOUNTER — LAB VISIT (OUTPATIENT)
Dept: LAB | Facility: HOSPITAL | Age: 68
End: 2023-03-21
Attending: OTOLARYNGOLOGY
Payer: MEDICARE

## 2023-03-21 DIAGNOSIS — G96.01 CSF LEAK FROM EAR: ICD-10-CM

## 2023-03-21 DIAGNOSIS — H65.22 CHRONIC SEROUS OTITIS MEDIA OF LEFT EAR: ICD-10-CM

## 2023-03-21 LAB
CREAT SERPL-MCNC: 1.1 MG/DL (ref 0.5–1.4)
EST. GFR  (NO RACE VARIABLE): 54.4 ML/MIN/1.73 M^2

## 2023-03-21 PROCEDURE — 36415 COLL VENOUS BLD VENIPUNCTURE: CPT | Mod: PO | Performed by: OTOLARYNGOLOGY

## 2023-03-21 PROCEDURE — 82565 ASSAY OF CREATININE: CPT | Performed by: OTOLARYNGOLOGY

## 2023-04-03 ENCOUNTER — HOSPITAL ENCOUNTER (OUTPATIENT)
Dept: RADIOLOGY | Facility: HOSPITAL | Age: 68
Discharge: HOME | End: 2023-04-03
Attending: OTOLARYNGOLOGY
Payer: MEDICARE

## 2023-04-03 DIAGNOSIS — G96.01 CSF LEAK FROM EAR: ICD-10-CM

## 2023-04-03 DIAGNOSIS — H65.22 CHRONIC SEROUS OTITIS MEDIA OF LEFT EAR: ICD-10-CM

## 2023-04-03 PROCEDURE — 70553 MRI BRAIN STEM W/O & W/DYE: CPT | Mod: TC,PO

## 2023-04-03 PROCEDURE — 25500020 PHARM REV CODE 255: Mod: PO | Performed by: OTOLARYNGOLOGY

## 2023-04-03 PROCEDURE — A9585 GADOBUTROL INJECTION: HCPCS | Mod: PO | Performed by: OTOLARYNGOLOGY

## 2023-04-03 PROCEDURE — 70553 MRI IAC/TEMPORAL BONES W W/O CONTRAST: ICD-10-PCS | Mod: 26,,, | Performed by: RADIOLOGY

## 2023-04-03 PROCEDURE — 70553 MRI BRAIN STEM W/O & W/DYE: CPT | Mod: 26,,, | Performed by: RADIOLOGY

## 2023-04-03 RX ORDER — GADOBUTROL 604.72 MG/ML
10 INJECTION INTRAVENOUS
Status: COMPLETED | OUTPATIENT
Start: 2023-04-03 | End: 2023-04-03

## 2023-04-03 RX ADMIN — GADOBUTROL 8 ML: 604.72 INJECTION INTRAVENOUS at 09:04

## 2023-04-04 ENCOUNTER — PATIENT MESSAGE (OUTPATIENT)
Dept: OTOLARYNGOLOGY | Facility: CLINIC | Age: 68
End: 2023-04-04

## 2023-04-04 ENCOUNTER — OFFICE VISIT (OUTPATIENT)
Dept: OTOLARYNGOLOGY | Facility: CLINIC | Age: 68
End: 2023-04-04
Payer: MEDICARE

## 2023-04-04 ENCOUNTER — CLINICAL SUPPORT (OUTPATIENT)
Dept: AUDIOLOGY | Facility: CLINIC | Age: 68
End: 2023-04-04
Payer: MEDICARE

## 2023-04-04 DIAGNOSIS — Q01.8 TEMPORAL ENCEPHALOCELE: ICD-10-CM

## 2023-04-04 DIAGNOSIS — H90.A32 MIXED CONDUCTIVE AND SENSORINEURAL HEARING LOSS OF LEFT EAR WITH RESTRICTED HEARING OF RIGHT EAR: Primary | ICD-10-CM

## 2023-04-04 DIAGNOSIS — G96.01 CSF LEAK FROM EAR: ICD-10-CM

## 2023-04-04 DIAGNOSIS — R51.9 LEFT-SIDED HEADACHE: ICD-10-CM

## 2023-04-04 DIAGNOSIS — H93.A2 PULSATILE TINNITUS OF LEFT EAR: ICD-10-CM

## 2023-04-04 DIAGNOSIS — H65.22 CHRONIC SEROUS OTITIS MEDIA OF LEFT EAR: ICD-10-CM

## 2023-04-04 PROCEDURE — 1159F MED LIST DOCD IN RCRD: CPT | Mod: CPTII,S$GLB,, | Performed by: OTOLARYNGOLOGY

## 2023-04-04 PROCEDURE — 92557 PR COMPREHENSIVE HEARING TEST: ICD-10-PCS | Mod: S$GLB,,,

## 2023-04-04 PROCEDURE — 3288F FALL RISK ASSESSMENT DOCD: CPT | Mod: CPTII,S$GLB,, | Performed by: OTOLARYNGOLOGY

## 2023-04-04 PROCEDURE — 92567 PR TYMPA2METRY: ICD-10-PCS | Mod: S$GLB,,,

## 2023-04-04 PROCEDURE — 99999 PR PBB SHADOW E&M-EST. PATIENT-LVL III: ICD-10-PCS | Mod: PBBFAC,,, | Performed by: OTOLARYNGOLOGY

## 2023-04-04 PROCEDURE — 1101F PT FALLS ASSESS-DOCD LE1/YR: CPT | Mod: CPTII,S$GLB,, | Performed by: OTOLARYNGOLOGY

## 2023-04-04 PROCEDURE — 1159F PR MEDICATION LIST DOCUMENTED IN MEDICAL RECORD: ICD-10-PCS | Mod: CPTII,S$GLB,, | Performed by: OTOLARYNGOLOGY

## 2023-04-04 PROCEDURE — 1126F PR PAIN SEVERITY QUANTIFIED, NO PAIN PRESENT: ICD-10-PCS | Mod: CPTII,S$GLB,, | Performed by: OTOLARYNGOLOGY

## 2023-04-04 PROCEDURE — 99214 OFFICE O/P EST MOD 30 MIN: CPT | Mod: S$GLB,,, | Performed by: OTOLARYNGOLOGY

## 2023-04-04 PROCEDURE — 99999 PR PBB SHADOW E&M-EST. PATIENT-LVL III: CPT | Mod: PBBFAC,,, | Performed by: OTOLARYNGOLOGY

## 2023-04-04 PROCEDURE — 1101F PR PT FALLS ASSESS DOC 0-1 FALLS W/OUT INJ PAST YR: ICD-10-PCS | Mod: CPTII,S$GLB,, | Performed by: OTOLARYNGOLOGY

## 2023-04-04 PROCEDURE — 99214 PR OFFICE/OUTPT VISIT, EST, LEVL IV, 30-39 MIN: ICD-10-PCS | Mod: S$GLB,,, | Performed by: OTOLARYNGOLOGY

## 2023-04-04 PROCEDURE — 92557 COMPREHENSIVE HEARING TEST: CPT | Mod: S$GLB,,,

## 2023-04-04 PROCEDURE — 1126F AMNT PAIN NOTED NONE PRSNT: CPT | Mod: CPTII,S$GLB,, | Performed by: OTOLARYNGOLOGY

## 2023-04-04 PROCEDURE — 92567 TYMPANOMETRY: CPT | Mod: S$GLB,,,

## 2023-04-04 PROCEDURE — 3288F PR FALLS RISK ASSESSMENT DOCUMENTED: ICD-10-PCS | Mod: CPTII,S$GLB,, | Performed by: OTOLARYNGOLOGY

## 2023-04-04 NOTE — LETTER
April 4, 2023        Arden Lim MD  [address]  [facility] [address]             [facility] [place] [address]  [address]  [facility] [address]  Phone: [phone]  Fax: [phone]   Patient: Tona Zafar   MR Number: [id number]   YOB: 1955   Date of Visit: 4/4/2023       Dear Dr. Lim:    Thank you for referring Tnoa Zafar to me for evaluation. Below are the relevant portions of my assessment and plan of care.            If you have questions, please do not hesitate to call me. I look forward to following Tona along with you.    Sincerely,      Gasper Griffith MD           CC  No Recipients

## 2023-04-04 NOTE — PROGRESS NOTES
Tona Zafar, a 68 y.o. female, was seen today in the clinic for an audiologic evaluation.  The patient's main complaint was a decrease in hearing on the left side.  Mrs. Zafar reported that she has been diagnosed with a CSF leak on the left side, and has been experiencing hearing her own heartbeat on that side as well. Mrs. Zafar has constant left sided aural pressure that can lead to severe otalgia. She denied vertigo.     Tympanometry revealed Type A in the right ear and Type As in the left ear. Audiogram results revealed a mild high frequency sensorineural hearing loss in the right ear and a moderately-severe to severe mixed hearing loss in the left ear.  Speech reception thresholds were noted at 15 dB in the right ear and 50 dB in the left ear.  Speech discrimination scores were 100% in the right ear and 100% in the left ear.    Recommendations:  Otologic evaluation  Repeat audiogram following medical intervention or at ENT follow-up  Hearing protection when in noise

## 2023-04-04 NOTE — PROGRESS NOTES
Subjective     Patient ID: Tona Zafar is a 68 y.o. female.    Chief Complaint: Other (CSF LEAK )    HPI     Tona Zafar is a 68 y.o. female presents on evaluation from Dr. Arden Lim for left temporal bone CSF leak.  She reports several year history of left sided ear symptoms including pulsatile tinnitus, ear fullness, decreased hearing, and more recently unilateral rhinorrhea when bending over.  Symptoms present since at least 2018.  States has seen many doctors over the years but was never given a diagnosis.  No prior history of trauma or ear surgery.        Review of Systems   HENT:  Positive for ear pain, hearing loss and mouth sores.    Eyes: Negative.    Respiratory:  Positive for shortness of breath.    Cardiovascular: Negative.    Gastrointestinal:  Positive for abdominal pain, constipation and diarrhea.   Endocrine: Negative.    Genitourinary: Negative.    Musculoskeletal:  Positive for back pain and neck pain.   Integumentary:  Negative.   Allergic/Immunologic: Negative.    Neurological:  Positive for headaches.   Hematological: Negative.    Psychiatric/Behavioral: Negative.          Objective     Physical Exam  Vitals and nursing note reviewed.   Constitutional:       Appearance: Normal appearance.   HENT:      Head: Normocephalic and atraumatic.      Right Ear: Tympanic membrane, ear canal and external ear normal. There is no impacted cerumen.      Left Ear: Ear canal and external ear normal. A middle ear effusion (opaque appearing TM with possible encephalocele tissue behind TM) is present. There is no impacted cerumen.   Neurological:      Mental Status: She is alert.     Data Reviewed:      Audiogram tracings independently reviewed and discussed with patient shows large left sided CHL    CT temporal bones image independently reviewed by me and show:    1. Redemonstrated chronic and complete opacification of the left middle ear cavity and mastoid air cells.  Multifocal regions of extreme  thinning or dehiscence involving the tegmen mastoideum and tegmen tympani.  Ossicles are intact and the bony labyrinth appears to be within normal limits.  2. Normal appearance of the right temporal bone.     MRI IAC:  Impression:     1. Presumed lateral skull base CSF leak through the tegmen via communicating tracts from the lateral ventricle as discussed above.  Suspected small cephalocele and superimposed fistula tract.  Redemonstrated complete opacification of the left mastoid and middle ear cavities.  These findings correlate with CT findings from 03/08/2023.  2. Otherwise negative contrast-enhanced MRI of the brain for age without evidence of an acute process.  Normal appearance of the inner ear structures/CP angles.    Labs:  Beta-2 testing: positive        Assessment and Plan     Problem List Items Addressed This Visit    None  Visit Diagnoses       Mixed conductive and sensorineural hearing loss of left ear with restricted hearing of right ear    -  Primary    CSF leak from ear        Chronic serous otitis media of left ear        Left-sided headache        Temporal encephalocele                In summary this is a pleasant 68 y.o. with a chronic left sided temporal bone CSF leak and encephalocele as evidenced by exam, imaging studies, and beta-2 testing    Recommend middle fossa craniotomy with repair of tegmen defect of skull base, left side    Discussed Left middle fossa approach  Discussed possibility of facial nerve paralysis, hearing loss, balance loss, stroke, brain hemorrhage, even death.  Risks, complications, alternatives and other potential problems discussed and patient expressed understanding.      Will schedule with Dr. Perez  Tentative surgical date of 5/28/23

## 2023-04-05 ENCOUNTER — TELEPHONE (OUTPATIENT)
Dept: NEUROSURGERY | Facility: CLINIC | Age: 68
End: 2023-04-05
Payer: MEDICARE

## 2023-04-05 NOTE — TELEPHONE ENCOUNTER
CHYNA and SW pt, who explained she would not be able to have surgery on 5/8/23 due to that date being her son and 's birthday. She would like to tentatively reschedule for a later date, as she is still having some reservations about surgery. Pt is interested in speaking with Dr. Perez. I scheduled her for a virtual visit with Dr. Perez 4/19/23 at 4:30 pm. Pt is happy with date and time, she did not have any further questions at this time.    ----- Message from Tiana Melvin sent at 4/5/2023  9:13 AM CDT -----  Regarding: Missed call  Contact: [phone]  Calling in regards to missed call to schedule an appointment. Please call.

## 2023-04-05 NOTE — TELEPHONE ENCOUNTER
[facility] for pt, offered to schedule her in-person or virtual appointment with Dr. Perez. Asked pt to call back.     ----- Message from Gasper Griffith MD sent at 4/4/2023  1:41 PM CDT -----  Mariam,     Could we set this patient up for a visit with Dr. Perez for a CSF leak evaluation.  We are planning surgery tentatively for May 8th so sometime before then.      Thanks,    -AM

## 2023-04-12 DIAGNOSIS — Q16.5 TEGMEN DEFECT OF BASE OF SKULL: ICD-10-CM

## 2023-04-12 DIAGNOSIS — G96.01 CSF LEAK FROM EAR: Primary | ICD-10-CM

## 2023-04-12 DIAGNOSIS — Q01.8 TEMPORAL ENCEPHALOCELE: ICD-10-CM

## 2023-04-19 ENCOUNTER — OFFICE VISIT (OUTPATIENT)
Dept: NEUROSURGERY | Facility: CLINIC | Age: 68
End: 2023-04-19
Payer: MEDICARE

## 2023-04-19 DIAGNOSIS — Q01.8 TEMPORAL ENCEPHALOCELE: ICD-10-CM

## 2023-04-19 DIAGNOSIS — Q16.5 TEGMEN DEFECT OF BASE OF SKULL: Primary | ICD-10-CM

## 2023-04-19 PROCEDURE — 99205 OFFICE O/P NEW HI 60 MIN: CPT | Mod: 95,,, | Performed by: NEUROLOGICAL SURGERY

## 2023-04-19 PROCEDURE — 1160F PR REVIEW ALL MEDS BY PRESCRIBER/CLIN PHARMACIST DOCUMENTED: ICD-10-PCS | Mod: CPTII,95,, | Performed by: NEUROLOGICAL SURGERY

## 2023-04-19 PROCEDURE — 1159F PR MEDICATION LIST DOCUMENTED IN MEDICAL RECORD: ICD-10-PCS | Mod: CPTII,95,, | Performed by: NEUROLOGICAL SURGERY

## 2023-04-19 PROCEDURE — 1159F MED LIST DOCD IN RCRD: CPT | Mod: CPTII,95,, | Performed by: NEUROLOGICAL SURGERY

## 2023-04-19 PROCEDURE — 99205 PR OFFICE/OUTPT VISIT, NEW, LEVL V, 60-74 MIN: ICD-10-PCS | Mod: 95,,, | Performed by: NEUROLOGICAL SURGERY

## 2023-04-19 PROCEDURE — 1160F RVW MEDS BY RX/DR IN RCRD: CPT | Mod: CPTII,95,, | Performed by: NEUROLOGICAL SURGERY

## 2023-04-19 NOTE — PROGRESS NOTES
CHIEF COMPLAINT:  Tegmen defect and encephalocele    HPI:  Tona Zafar is a 68 y.o.  female with below PMH, who is referred to me by Dr Griffith for evaluation of left-sided tegmen defect and encephalocele.  This was discovered after the patient developed clear watery drainage from her nose and left sided hearing loss.    She reports chronic migraines.  Denies any vision changes.      FROM ENT NOTE:  68 y.o. female presents on evaluation from Dr. Arden Lim for left temporal bone CSF leak.  She reports several year history of left sided ear symptoms including pulsatile tinnitus, ear fullness, decreased hearing, and more recently unilateral rhinorrhea when bending over.  Symptoms present since at least 2018.  States has seen many doctors over the years but was never given a diagnosis.  No prior history of trauma or ear surgery.      Review of patient's allergies indicates:  No Known Allergies    Past Medical History:   Diagnosis Date    Abnormal bone density screening     Degenerative joint disease 02/05/2007    History of chicken pox     History of depression 02/05/2007    Lumbar disc disease      Past Surgical History:   Procedure Laterality Date    APPENDECTOMY      AUGMENTATION OF BREAST      BREAST SURGERY Bilateral 2005    Implants    CHOLECYSTECTOMY  06/2012    DENTAL SURGERY      DILATION AND CURETTAGE OF UTERUS      HIP SURGERY Right 2007    TONSILLECTOMY      WRIST SURGERY Left 01/2014     Family History   Problem Relation Age of Onset    Diabetes Mother     Cancer Father     Throat cancer Father     Breast cancer Neg Hx     Ovarian cancer Neg Hx      Social History     Tobacco Use    Smoking status: Never    Smokeless tobacco: Never   Substance Use Topics    Alcohol use: Yes     Comment: occ    Drug use: No        Review of Systems   Constitutional: Negative.    HENT:  Positive for hearing loss and tinnitus. Negative for congestion, ear discharge, ear pain, nosebleeds and sinus pain.    Eyes:  Negative.    Respiratory: Negative.     Cardiovascular:  Negative for chest pain, palpitations, claudication and leg swelling.   Gastrointestinal:  Negative for abdominal pain, blood in stool, constipation, diarrhea, melena and vomiting.   Genitourinary:  Negative for flank pain, frequency and urgency.   Musculoskeletal:  Negative for falls.   Skin: Negative.    Neurological: Negative.    Endo/Heme/Allergies:  Does not bruise/bleed easily.   Psychiatric/Behavioral: Negative.       OBJECTIVE:   Grossly intact  No focal deficit  CN 2-12 intact    Diagnostic Results:  All imaging was independently reviewed by me.    MRI brain, dated 3/20/23:  1. Left sided encephalocele and tegmen defect  2. Left mastoid effusion  3. No empty sella or optic nerve kinking    CT temporal bone, dated 3/8/23:  1. 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  Rhinorrhea positive for beta-2.  Will plan on combined repair with Dr Griffith.  Given her h/o headaches, I will perform LP/LD at beginning of case to measure OP.   I discuss the details of the surgery as well as the perioperative expectations and risks/benefits.  All parties in agreement with surgery.    - Surgery scheduled for 6/5/23 at [facility]  - Preop clearance needed from PCP or Dr Rogers  - Preop PAT appointment requested  - STOP TAKING ASPIRIN, NSAIDS, AND ALL OTHER BLOOD THINNERS 7 DAYS PRIOR TO SURGERY      The patient understands and agrees with the plan of care. All questions were answered.    Time spent on this encounter: 60 minutes. This includes face-to-face time and non-face to face time preparing to see the patient (eg, review of tests), obtaining and/or reviewing separately obtained history, documenting clinical information in the electronic or other health  record, independently interpreting results and communicating results to the patient/family/caregiver, or care coordinator.          .

## 2023-04-26 ENCOUNTER — PATIENT MESSAGE (OUTPATIENT)
Dept: NEUROSURGERY | Facility: CLINIC | Age: 68
End: 2023-04-26
Payer: MEDICARE

## 2023-05-01 ENCOUNTER — TELEPHONE (OUTPATIENT)
Dept: NEUROSURGERY | Facility: CLINIC | Age: 68
End: 2023-05-01
Payer: MEDICARE

## 2023-05-01 ENCOUNTER — LAB VISIT (OUTPATIENT)
Dept: LAB | Facility: HOSPITAL | Age: 68
End: 2023-05-01
Attending: NEUROLOGICAL SURGERY
Payer: MEDICARE

## 2023-05-01 DIAGNOSIS — M80.0AXS AGE-RELATED OSTEOPOROSIS WITH CURRENT PATHOLOGICAL FRACTURE, OTHER SITE, SEQUELA: ICD-10-CM

## 2023-05-01 DIAGNOSIS — Q01.8 TEMPORAL ENCEPHALOCELE: ICD-10-CM

## 2023-05-01 DIAGNOSIS — Q16.5 TEGMEN DEFECT OF BASE OF SKULL: Primary | ICD-10-CM

## 2023-05-01 DIAGNOSIS — Q16.5 TEGMEN DEFECT OF BASE OF SKULL: ICD-10-CM

## 2023-05-01 DIAGNOSIS — I67.848 OTHER CEREBROVASCULAR VASOSPASM AND VASOCONSTRICTION: ICD-10-CM

## 2023-05-01 LAB
ALBUMIN SERPL BCP-MCNC: 3.2 G/DL (ref 3.5–5.2)
ALP SERPL-CCNC: 80 U/L (ref 55–135)
ALT SERPL W/O P-5'-P-CCNC: 15 U/L (ref 10–44)
ANION GAP SERPL CALC-SCNC: 10 MMOL/L (ref 8–16)
APTT PPP: 26 SEC (ref 21–32)
AST SERPL-CCNC: 16 U/L (ref 10–40)
BACTERIA #/AREA URNS HPF: NORMAL /HPF
BASOPHILS # BLD AUTO: 0.05 K/UL (ref 0–0.2)
BASOPHILS NFR BLD: 1 % (ref 0–1.9)
BILIRUB SERPL-MCNC: 0.2 MG/DL (ref 0.1–1)
BILIRUB UR QL STRIP: NEGATIVE
BUN SERPL-MCNC: 18 MG/DL (ref 8–23)
CALCIUM SERPL-MCNC: 9.4 MG/DL (ref 8.7–10.5)
CHLORIDE SERPL-SCNC: 104 MMOL/L (ref 95–110)
CLARITY UR: CLEAR
CO2 SERPL-SCNC: 28 MMOL/L (ref 23–29)
COLOR UR: YELLOW
CREAT SERPL-MCNC: 0.9 MG/DL (ref 0.5–1.4)
DIFFERENTIAL METHOD: ABNORMAL
EOSINOPHIL # BLD AUTO: 0 K/UL (ref 0–0.5)
EOSINOPHIL NFR BLD: 0 % (ref 0–8)
ERYTHROCYTE [DISTWIDTH] IN BLOOD BY AUTOMATED COUNT: 14.6 % (ref 11.5–14.5)
EST. GFR  (NO RACE VARIABLE): >60 ML/MIN/1.73 M^2
GLUCOSE SERPL-MCNC: 109 MG/DL (ref 70–110)
GLUCOSE UR QL STRIP: NEGATIVE
HCT VFR BLD AUTO: 41.4 % (ref 37–48.5)
HGB BLD-MCNC: 12.9 G/DL (ref 12–16)
HGB UR QL STRIP: NEGATIVE
IMM GRANULOCYTES # BLD AUTO: 0.02 K/UL (ref 0–0.04)
IMM GRANULOCYTES NFR BLD AUTO: 0.4 % (ref 0–0.5)
INR PPP: 0.9 (ref 0.8–1.2)
KETONES UR QL STRIP: NEGATIVE
LEUKOCYTE ESTERASE UR QL STRIP: ABNORMAL
LYMPHOCYTES # BLD AUTO: 2 K/UL (ref 1–4.8)
LYMPHOCYTES NFR BLD: 40.1 % (ref 18–48)
MCH RBC QN AUTO: 27.3 PG (ref 27–31)
MCHC RBC AUTO-ENTMCNC: 31.2 G/DL (ref 32–36)
MCV RBC AUTO: 88 FL (ref 82–98)
MICROSCOPIC COMMENT: NORMAL
MONOCYTES # BLD AUTO: 0.3 K/UL (ref 0.3–1)
MONOCYTES NFR BLD: 5.9 % (ref 4–15)
NEUTROPHILS # BLD AUTO: 2.7 K/UL (ref 1.8–7.7)
NEUTROPHILS NFR BLD: 52.6 % (ref 38–73)
NITRITE UR QL STRIP: NEGATIVE
NRBC BLD-RTO: 0 /100 WBC
PH UR STRIP: 6 [PH] (ref 5–8)
PLATELET # BLD AUTO: 388 K/UL (ref 150–450)
PMV BLD AUTO: 9.9 FL (ref 9.2–12.9)
POTASSIUM SERPL-SCNC: 4.1 MMOL/L (ref 3.5–5.1)
PROT SERPL-MCNC: 6.8 G/DL (ref 6–8.4)
PROT UR QL STRIP: NEGATIVE
PROTHROMBIN TIME: 10.2 SEC (ref 9–12.5)
RBC # BLD AUTO: 4.72 M/UL (ref 4–5.4)
SODIUM SERPL-SCNC: 142 MMOL/L (ref 136–145)
SP GR UR STRIP: 1.01 (ref 1–1.03)
URN SPEC COLLECT METH UR: ABNORMAL
WBC # BLD AUTO: 5.09 K/UL (ref 3.9–12.7)
WBC #/AREA URNS HPF: 3 /HPF (ref 0–5)

## 2023-05-01 PROCEDURE — 80053 COMPREHEN METABOLIC PANEL: CPT | Performed by: NEUROLOGICAL SURGERY

## 2023-05-01 PROCEDURE — 81000 URINALYSIS NONAUTO W/SCOPE: CPT | Mod: PO | Performed by: NEUROLOGICAL SURGERY

## 2023-05-01 PROCEDURE — 36415 COLL VENOUS BLD VENIPUNCTURE: CPT | Mod: PO | Performed by: NEUROLOGICAL SURGERY

## 2023-05-01 PROCEDURE — 85025 COMPLETE CBC W/AUTO DIFF WBC: CPT | Performed by: NEUROLOGICAL SURGERY

## 2023-05-01 PROCEDURE — 85610 PROTHROMBIN TIME: CPT | Mod: PO | Performed by: NEUROLOGICAL SURGERY

## 2023-05-01 PROCEDURE — 85730 THROMBOPLASTIN TIME PARTIAL: CPT | Mod: PO | Performed by: NEUROLOGICAL SURGERY

## 2023-05-01 NOTE — TELEPHONE ENCOUNTER
CB and SW pt, explained I have never heard of a primary care physician not ordering pre-op tests. However, Pt recently had chest XR and EKG. I ordered new blood work for the patient, who chose to get it done today in [place]. I will fax the results to Dr. Barrera. Pt happy with call and did not have any further questions at this time.    ----- Message from Meche Trinidad sent at 4/28/2023  8:41 AM CDT -----  Regarding: Pt Advice  Contact: [phone]  Pt is calling in reference to her pre op forms and instructions.  Dr Barrera informed her that he's not the one who is supposed to complete those pre op tests.  Please call.

## 2023-05-15 ENCOUNTER — TELEPHONE (OUTPATIENT)
Dept: GASTROENTEROLOGY | Facility: CLINIC | Age: 68
End: 2023-05-15
Payer: MEDICARE

## 2023-05-15 NOTE — TELEPHONE ENCOUNTER
Returned patient call, informed pt to stop in and sign a release of information form for us to request medical records. Pt verbalized understanding

## 2023-05-15 NOTE — TELEPHONE ENCOUNTER
----- Message from Duane Worley sent at 5/15/2023  2:10 PM CDT -----  Contact: pt  [phone]  Type: Needs Medical Advice  Who Called:  pt  Best Call Back Number: [phone]    Additional Information: Pt is calling the office  wants [facility] to send some sort of form over so her records can be released over. Please call back and advise. Fax number for [facility] [phone]

## 2023-05-16 ENCOUNTER — TELEPHONE (OUTPATIENT)
Dept: GASTROENTEROLOGY | Facility: CLINIC | Age: 68
End: 2023-05-16
Payer: MEDICARE

## 2023-05-16 NOTE — TELEPHONE ENCOUNTER
----- Message from Saumyajanelle Cabralcatarina sent at 5/16/2023  3:31 PM CDT -----  Regarding: Needs Medical Advice  Contact: patient at [phone]  Type: Needs Medical Advice  Who Called:  Patient    Best Call Back Number: [phone]    Additional Information: Patient is calling to see if medical records were sent from prior doctor or if a document needs to be sent to old doctor to request records. Please call and advise. Thank you

## 2023-05-16 NOTE — TELEPHONE ENCOUNTER
Returned patient call, [facility] and sign [facility] of information.

## 2023-05-19 ENCOUNTER — PATIENT MESSAGE (OUTPATIENT)
Dept: SURGERY | Facility: HOSPITAL | Age: 68
End: 2023-05-19
Payer: MEDICARE

## 2023-05-19 ENCOUNTER — OFFICE VISIT (OUTPATIENT)
Dept: GASTROENTEROLOGY | Facility: CLINIC | Age: 68
End: 2023-05-19
Payer: MEDICARE

## 2023-05-19 VITALS — HEIGHT: 70 IN | WEIGHT: 182.13 LBS | BODY MASS INDEX: 26.07 KG/M2

## 2023-05-19 DIAGNOSIS — Z87.19 HISTORY OF ISCHEMIC COLITIS: ICD-10-CM

## 2023-05-19 DIAGNOSIS — K59.09 CHRONIC CONSTIPATION: ICD-10-CM

## 2023-05-19 DIAGNOSIS — R10.84 GENERALIZED ABDOMINAL PAIN: ICD-10-CM

## 2023-05-19 DIAGNOSIS — G96.01 CSF LEAK FROM NOSE: ICD-10-CM

## 2023-05-19 DIAGNOSIS — Z80.0 FAMILY HISTORY OF COLON CANCER: ICD-10-CM

## 2023-05-19 DIAGNOSIS — Z86.010 HISTORY OF COLON POLYPS: ICD-10-CM

## 2023-05-19 DIAGNOSIS — K31.84 GASTROPARESIS: ICD-10-CM

## 2023-05-19 DIAGNOSIS — R05.9 COUGH, UNSPECIFIED TYPE: ICD-10-CM

## 2023-05-19 DIAGNOSIS — R12 HEARTBURN: ICD-10-CM

## 2023-05-19 DIAGNOSIS — R19.8 IRREGULAR BOWEL HABITS: Primary | ICD-10-CM

## 2023-05-19 PROCEDURE — 1126F AMNT PAIN NOTED NONE PRSNT: CPT | Mod: CPTII,S$GLB,, | Performed by: NURSE PRACTITIONER

## 2023-05-19 PROCEDURE — 3288F FALL RISK ASSESSMENT DOCD: CPT | Mod: CPTII,S$GLB,, | Performed by: NURSE PRACTITIONER

## 2023-05-19 PROCEDURE — 1101F PR PT FALLS ASSESS DOC 0-1 FALLS W/OUT INJ PAST YR: ICD-10-PCS | Mod: CPTII,S$GLB,, | Performed by: NURSE PRACTITIONER

## 2023-05-19 PROCEDURE — 99999 PR PBB SHADOW E&M-EST. PATIENT-LVL IV: CPT | Mod: PBBFAC,,, | Performed by: NURSE PRACTITIONER

## 2023-05-19 PROCEDURE — 3008F BODY MASS INDEX DOCD: CPT | Mod: CPTII,S$GLB,, | Performed by: NURSE PRACTITIONER

## 2023-05-19 PROCEDURE — 1126F PR PAIN SEVERITY QUANTIFIED, NO PAIN PRESENT: ICD-10-PCS | Mod: CPTII,S$GLB,, | Performed by: NURSE PRACTITIONER

## 2023-05-19 PROCEDURE — 99999 PR PBB SHADOW E&M-EST. PATIENT-LVL IV: ICD-10-PCS | Mod: PBBFAC,,, | Performed by: NURSE PRACTITIONER

## 2023-05-19 PROCEDURE — 3008F PR BODY MASS INDEX (BMI) DOCUMENTED: ICD-10-PCS | Mod: CPTII,S$GLB,, | Performed by: NURSE PRACTITIONER

## 2023-05-19 PROCEDURE — 99214 PR OFFICE/OUTPT VISIT, EST, LEVL IV, 30-39 MIN: ICD-10-PCS | Mod: S$GLB,,, | Performed by: NURSE PRACTITIONER

## 2023-05-19 PROCEDURE — 3288F PR FALLS RISK ASSESSMENT DOCUMENTED: ICD-10-PCS | Mod: CPTII,S$GLB,, | Performed by: NURSE PRACTITIONER

## 2023-05-19 PROCEDURE — 1101F PT FALLS ASSESS-DOCD LE1/YR: CPT | Mod: CPTII,S$GLB,, | Performed by: NURSE PRACTITIONER

## 2023-05-19 PROCEDURE — 99214 OFFICE O/P EST MOD 30 MIN: CPT | Mod: S$GLB,,, | Performed by: NURSE PRACTITIONER

## 2023-05-19 RX ORDER — OMEPRAZOLE 40 MG/1
40 CAPSULE, DELAYED RELEASE ORAL
Qty: 30 CAPSULE | Refills: 1 | Status: SHIPPED | OUTPATIENT
Start: 2023-05-19 | End: 2023-05-23

## 2023-05-19 RX ORDER — POLYETHYLENE GLYCOL 3350 17 G/17G
POWDER, FOR SOLUTION ORAL
COMMUNITY
End: 2023-05-23

## 2023-05-19 RX ORDER — ADHESIVE BANDAGE
30 BANDAGE TOPICAL DAILY PRN
COMMUNITY

## 2023-05-19 NOTE — PROGRESS NOTES
Subjective:       Patient ID: Tona Zafar is a 68 y.o. female Body mass index is 26.13 kg/m².    Chief Complaint: GI Problem (Constipation/Diarrhea since 2019. Had colonoscopy in 2020 and needed to repeat in 3 months but covid started. Has not had repeat colon since./Took fiber and described as kirsten. Stopped fiber and started taking a laxative, was taking laxative every other day. Stool scale 1 when she was constipation and a 6/7 when she had diarrhea. )    This patient is new to me.    Patient previously seen by Dr. Drew and Dr. Trujillo for her GI care.    GI Problem  The primary symptoms include abdominal pain and diarrhea (only occurs with laxative use). Primary symptoms do not include fever, weight loss, fatigue, nausea, vomiting, melena, hematemesis, hematochezia or dysuria.   The abdominal pain began more than 2 days ago (started several years ago). The abdominal pain is generalized (described as bloating & fullness). The severity of the abdominal pain is 0/10 (currently). The abdominal pain is relieved by bowel movements and passing flatus (worse with stress).   The illness is also significant for bloating and constipation (chronic for several years; bowel movements are currently every other day with taking miralax 17 grams every other day). The illness does not include chills, dysphagia or odynophagia. Associated symptoms comments: TREATMENT: glycolax 17 g every other day, milk of magnesia PRN, magnesium supplement  PAST TREATMENT: fiber, amitiza, lialda, linzess- caused severe diarrhea does not know the dosage. Significant associated medical issues include GERD (maybe once a week, PAST TREATMENT: tums). Associated medical issues do not include inflammatory bowel disease (patient denies prior history of diagnosis of UC or Crohn's).     Review of Systems   Constitutional:  Negative for appetite change, chills, fatigue, fever and weight loss.   HENT:  Negative for sore throat and trouble swallowing.     Respiratory:  Positive for cough (started a couple days ago with waking up with a cough, nonproductive; reports having a procedure done soon for leaking CSF, reports frequent nasal drainage). Negative for choking and shortness of breath.    Cardiovascular:  Negative for chest pain.   Gastrointestinal:  Positive for abdominal pain, bloating, constipation (chronic for several years; bowel movements are currently every other day with taking miralax 17 grams every other day) and diarrhea (only occurs with laxative use). Negative for anal bleeding, blood in stool, dysphagia, hematemesis, hematochezia, melena, nausea, rectal pain and vomiting.   Genitourinary:  Negative for difficulty urinating, dysuria and flank pain.   Neurological:  Negative for weakness.       No LMP recorded. Patient is postmenopausal.  Past Medical History:   Diagnosis Date    Abnormal bone density screening     Colon polyps     Degenerative joint disease 02/05/2007    Diverticulosis     Gastroparesis 2018    History of chicken pox     History of depression 02/05/2007    Ischemic colitis 2020    Dr. Drew    Lumbar disc disease      Past Surgical History:   Procedure Laterality Date    APPENDECTOMY      AUGMENTATION OF BREAST      BREAST SURGERY Bilateral 2005    Implants    CHOLECYSTECTOMY  06/2012    COLONOSCOPY  05/12/2020    Dr. Drew, in procedures; repeat in 6 months to check for healing of ischemic colitis    COLONOSCOPY  05/01/2018    Dr. Drew, in procedures    DENTAL SURGERY      DILATION AND CURETTAGE OF UTERUS      HIP SURGERY Right 2007    TONSILLECTOMY      UPPER GASTROINTESTINAL ENDOSCOPY  04/24/2018    Dr. Drew, in media    WRIST SURGERY Left 01/2014     Family History   Problem Relation Age of Onset    Diabetes Mother     Throat cancer Father     Colon cancer Maternal Grandfather     Breast cancer Neg Hx     Ovarian cancer Neg Hx     Crohn's disease Neg Hx     Esophageal cancer Neg Hx     Ulcerative colitis Neg Hx     Stomach  cancer Neg Hx     Celiac disease Neg Hx      Social History     Tobacco Use    Smoking status: Former     Types: Cigarettes    Smokeless tobacco: Never   Substance Use Topics    Alcohol use: Yes     Alcohol/week: 1.0 standard drink     Types: 1 Glasses of wine per week     Comment: occasional    Drug use: No     Wt Readings from Last 10 Encounters:   05/19/23 82.6 kg (182 lb 1.6 oz)   02/07/23 79.9 kg (176 lb 2.4 oz)   01/30/23 81.6 kg (179 lb 14.4 oz)   12/02/22 77 kg (169 lb 12.1 oz)   08/26/22 77.1 kg (169 lb 14.4 oz)   11/12/21 73.9 kg (162 lb 14.7 oz)   11/27/20 73.9 kg (163 lb)   02/26/20 75.6 kg (166 lb 11.2 oz)   05/15/19 68.5 kg (151 lb)   04/11/19 68 kg (150 lb)     Lab Results   Component Value Date    WBC 5.09 05/01/2023    HGB 12.9 05/01/2023    HCT 41.4 05/01/2023    MCV 88 05/01/2023     05/01/2023     CMP  Sodium   Date Value Ref Range Status   05/01/2023 142 136 - 145 mmol/L Final     Potassium   Date Value Ref Range Status   05/01/2023 4.1 3.5 - 5.1 mmol/L Final     Chloride   Date Value Ref Range Status   05/01/2023 104 95 - 110 mmol/L Final     CO2   Date Value Ref Range Status   05/01/2023 28 23 - 29 mmol/L Final     Glucose   Date Value Ref Range Status   05/01/2023 109 70 - 110 mg/dL Final     BUN   Date Value Ref Range Status   05/01/2023 18 8 - 23 mg/dL Final     Creatinine   Date Value Ref Range Status   05/01/2023 0.9 0.5 - 1.4 mg/dL Final     Calcium   Date Value Ref Range Status   05/01/2023 9.4 8.7 - 10.5 mg/dL Final     Total Protein   Date Value Ref Range Status   05/01/2023 6.8 6.0 - 8.4 g/dL Final     Albumin   Date Value Ref Range Status   05/01/2023 3.2 (L) 3.5 - 5.2 g/dL Final     Total Bilirubin   Date Value Ref Range Status   05/01/2023 0.2 0.1 - 1.0 mg/dL Final     Comment:     For infants and newborns, interpretation of results should be based  on gestational age, weight and in agreement with clinical  observations.    Premature Infant recommended reference ranges:  Up  to 24 hours.............<8.0 mg/dL  Up to 48 hours............<12.0 mg/dL  3-5 days..................<15.0 mg/dL  6-29 days.................<15.0 mg/dL       Alkaline Phosphatase   Date Value Ref Range Status   05/01/2023 80 55 - 135 U/L Final     AST   Date Value Ref Range Status   05/01/2023 16 10 - 40 U/L Final     ALT   Date Value Ref Range Status   05/01/2023 15 10 - 44 U/L Final     Anion Gap   Date Value Ref Range Status   05/01/2023 10 8 - 16 mmol/L Final     eGFR if    Date Value Ref Range Status   02/27/2020 >60.0 >60 mL/min/1.73 m^2 Final     eGFR if non    Date Value Ref Range Status   02/27/2020 >60.0 >60 mL/min/1.73 m^2 Final     Comment:     Calculation used to obtain the estimated glomerular filtration  rate (eGFR) is the CKD-EPI equation.        Lab Results   Component Value Date    LIPASE 63 03/18/2018     Lab Results   Component Value Date    TSH 0.710 05/10/2018     5/27/2020 calprotectin stool WNL  5/21/2020 inflammatory bowel disease profile WNL  4/18/2018 celiac serology WNL    Reviewed prior medical records including radiology report of 8/10/2018 and 7/30/2018 gastric empty studies (delayed with solid); 5/17/2018 CT abdomen pelvis; & endoscopy history (see surgical history/procedures).    Objective:      Physical Exam  Vitals and nursing note reviewed.   Constitutional:       General: She is not in acute distress.     Appearance: Normal appearance. She is well-developed. She is not diaphoretic.   HENT:      Mouth/Throat:      Lips: Pink. No lesions.      Mouth: Mucous membranes are moist. No oral lesions.      Tongue: No lesions.      Pharynx: Oropharynx is clear. No pharyngeal swelling or posterior oropharyngeal erythema.   Eyes:      General: No scleral icterus.     Conjunctiva/sclera: Conjunctivae normal.   Pulmonary:      Effort: Pulmonary effort is normal. No respiratory distress.      Breath sounds: Normal breath sounds. No wheezing.   Abdominal:       General: Bowel sounds are normal. There is no distension or abdominal bruit.      Palpations: Abdomen is soft. Abdomen is not rigid. There is no mass.      Tenderness: There is abdominal tenderness (mild) in the epigastric area. There is no guarding or rebound. Negative signs include Domínguez's sign and McBurney's sign.   Skin:     General: Skin is warm and dry.      Coloration: Skin is not jaundiced or pale.      Findings: No erythema or rash.   Neurological:      Mental Status: She is alert and oriented to person, place, and time.   Psychiatric:         Behavior: Behavior normal.         Thought Content: Thought content normal.         Judgment: Judgment normal.       Assessment:       1. Irregular bowel habits    2. Chronic constipation    3. Generalized abdominal pain    4. History of ischemic colitis    5. History of colon polyps    6. Family history of colon cancer    7. History of gastroparesis    8. Heartburn    9. Cough, unspecified type    10. CSF leak from nose        Plan:       Irregular bowel habits: Chronic constipation  - Recommend daily exercise as tolerated, adequate water intake (six 8-oz glasses of water daily)  -Recommend taking an OTC stool softener such as Colace as directed to avoid hard stools and straining with bowel movements PRN  - INCREASE TO GLYCOLAX 17 GRAMS once daily (17g PO) as directed  - If no improvement with above recommendations, try intermittently dosed ilk of magnesia OTC as directed (every 3-4  days) PRN to facilitate bowel movements  -If still no improvement with these measures, call/follow-up  - schedule Colonoscopy, discussed procedure with the patient, including risks and benefits, patient verbalized understanding    Generalized abdominal pain  -     START omeprazole (PRILOSEC) 40 MG capsule; Take 1 capsule (40 mg total) by mouth before breakfast. (Patient not taking: Reported on 5/23/2023)  Dispense: 30 capsule; Refill: 1  - schedule EGD, discussed procedure with patient,  including risks and benefits, patient verbalized understanding  - schedule Colonoscopy, discussed procedure with the patient, including risks and benefits, patient verbalized understanding  - avoid/minimize use of NSAIDs- since they can cause GI upset, bleeding and/or ulcers. If NSAID must be taken, recommend take with food.    History of ischemic colitis  - schedule Colonoscopy, discussed procedure with the patient, including risks and benefits, patient verbalized understanding    History of colon polyps & Family history of colon cancer  - schedule Colonoscopy, discussed procedure with the patient, including risks and benefits, patient verbalized understanding    History of gastroparesis  - schedule EGD, discussed procedure with patient, including risks and benefits, patient verbalized understanding  - Recommend small frequent meals instead of 3 big meals a day, low fat meals & low residue diet.  Avoid: high fiber (insoluble fiber), red meats, dairy, and non-digestible solids (peels, fruit pulp, etc). Avoid NSAIDs and narcotics.  -Discussed about possible medical therapy and discussed that this would be managed by one of the GI doctors, lastly possible referral to general surgery for surgical options, patient verbalized understanding    Heartburn  -     START omeprazole (PRILOSEC) 40 MG capsule; Take 1 capsule (40 mg total) by mouth before breakfast. (Patient not taking: Reported on 5/23/2023)  Dispense: 30 capsule; Refill: 1  - schedule EGD, discussed procedure with patient, including risks and benefits, patient verbalized understanding    Cough, unspecified type  - schedule EGD, discussed procedure with patient, including risks and benefits, patient verbalized understanding  - Recommend follow-up with Primary Care Provider for continued evaluation and management of non-GI related etiologies.    CSF leak from nose  Recommend follow-up with neurosurgery for continued evaluation and management.    Follow up in about  1 month (around 6/19/2023), or if symptoms worsen or fail to improve.      If no improvement in symptoms or symptoms worsen, call/follow-up at clinic or go to ER.        40 minutes of total time spent on the encounter, which includes face to face time and non-face to face time preparing to see the patient (e.g., review of tests), Obtaining and/or reviewing separately obtained history, Documenting clinical information in the electronic or other health record, Independently interpreting results (not separately reported) and communicating results to the patient/family/caregiver, or Care coordination (not separately reported).

## 2023-05-19 NOTE — PATIENT INSTRUCTIONS
Gastroparesis     Gastroparesis means that food and fluids move too slowly out of the stomach into the duodenum.   Gastroparesis (also called delayed gastric emptying) happens when the stomach takes longer than normal to empty of food. This is due to a problem with motility (the movement of the muscles in the digestive tract). For many people, gastroparesis is a lifelong condition. But treatment can help relieve symptoms and prevent complications. Read on to learn more about gastroparesis and how it can be managed.  How gastroparesis develops  With normal motility, signals from nerves tell the stomach muscles when to contract. These muscles move food from the stomach into the duodenum (the first part of the small bowel). With gastroparesis, the nerves or muscles are damaged. This causes motility to slow down or stop completely. As a result, food cannot move from the stomach properly. This delayed emptying can cause nausea, vomiting, and other symptoms. Malnutrition can result. Bezoars (hardened lumps of food) can form in the stomach and cause other complications as well.  Causes of gastroparesis  Gastroparesis can be caused by any of the following:  Diabetes  Surgery involving any of the digestive organs, such as the stomach and bowels  Certain medicines, such as strong pain medicines (narcotics)  Certain conditions, such as systemic scleroderma, Parkinson disease, and thyroid disease  After a viral illness  In many cases, the cause of gastroparesis cannot be found.  Signs and symptoms of gastroparesis  These can include:  Nausea and vomiting  Feeling full quickly when eating  Belly pain  Heartburn  Belly bloating  Weight loss  Loss of appetite  High and low blood sugar levels (in people with diabetes)  Diagnosing gastroparesis  Your healthcare provider will ask about your symptoms and health history. Youll also be examined. In addition, blood tests and X-rays are often done to check your health and rule out  other problems. To confirm the problem, you may need other tests as well. These can include:  Upper endoscopy. This is done to see inside the stomach and duodenum. For the test, an endoscope is used. This is a thin, flexible tube with a tiny camera on the end. Its inserted through the mouth and down into the stomach and duodenum.  Upper gastrointestinal (GI) series. This is done to take X-rays of the upper GI tract from the mouth to the small bowel. For the test, a substance called barium is used. The barium coats the upper GI tract so that it will show up clearly on X-rays.  Gastric emptying scan. This is done to measure how quickly food leaves the stomach. For the test, a meal containing a harmless radioactive substance (tracer) is eaten. Then scans of the stomach are done. The tracer shows up clearly on the scans and shows the movement of the food through the stomach.  Antroduodenal manometry. This test gives pressure measurements of the stomach and small intestine to check how the contractions are working.  Newer tests. These are being created and include breath tests and wireless capsule studies   Treating gastroparesis  The goal of treatment is to help you manage your condition. Treatment may include one or more of the following:  Dietary changes. You may need to make changes to your eating habits and daily diet. For instance, your healthcare provider may instruct you to eat small meals throughout the day. Doing this can keep you from feeling full too quickly. You may be placed on a liquid or soft diet. This means youll eat liquid foods or foods that are mashed or put through a . In addition, you may need to avoid foods high in fats and fiber. These can slow digestion. For more help with your diet, your healthcare provider can refer you to a dietitian. In severe cases, you may need a feeding tube. This sends liquid food or medicine directly to your small bowel, bypassing the stomach.  Treating  diabetes. If you are diabetic, it is important to control your blood sugar. High sugar levels worsen gastroparesis.   Medicines. These can help manage symptoms, such as nausea and vomiting. They can also improve motility. Each medicine has specific risks and side effects. Your doctor can tell you more about any medicine that is prescribed for you.  Surgery. You may need to have a tube surgically inserted into the stomach. The tube removes excess air and fluid. This can relieve severe symptoms of nausea and vomiting. In rare cases, other surgery may be needed on the stomach or small bowel. This is to create a new passageway for food to be emptied from the stomach.  Gastric electrical stimulation. This treatment is done less often and may not be available. Your healthcare provider can tell you more about this treatment if it is a choice for you.  Diabetes and gastroparesis  If you have diabetes, gastroparesis can make it harder to manage your blood sugar level. Youll need to take extra steps in your treatment to prevent complications. Work with your healthcare provider to learn what you can do to protect your health. For more information, contact the American Diabetes Association, www.diabetes.org.   Long-term concerns   With treatment, most people can manage their symptoms and maintain their usual routines. If your symptoms are moderate to severe, you may need to see your healthcare provider more often for checkups. Also, other treatments will likely be needed.  Date Last Reviewed: 7/14/2016 © 2000-2017 [employer] [place]. [address] [place] [id number]. All rights reserved. This information is not intended as a substitute for professional medical care. Always follow your healthcare professional's instructions.                GERD (Adult)    The esophagus is a tube that carries food from the mouth to the stomach. A valve at the lower end of the esophagus prevents stomach acid from flowing upward.  "When this valve doesn't work properly, stomach contents may repeatedly flow back up (reflux) into the esophagus. This is called gastroesophageal reflux disease (GERD). GERD can irritate the esophagus. It can cause problems with swallowing or breathing. In severe cases, GERD can cause recurrent pneumonia or other serious problems.  Symptoms of reflux include burning, pressure or sharp pain in the upper abdomen or mid to lower chest. The pain can spread to the neck, back, or shoulder. There may be belching, an acid taste in the back of the throat, chronic cough, or sore throat or hoarseness. GERD symptoms often occur during the day after a big meal. They can also occur at night when lying down.   Home care  Lifestyle changes can help reduce symptoms. If needed, medicines may be prescribed. Symptoms often improve with treatment, but if treatment is stopped, the symptoms often return after a few months. So most persons with GERD will need to continue treatment.  Lifestyle changes  Limit or avoid fatty, fried, and spicy foods, as well as coffee, chocolate, mint, and foods with high acid content such as tomatoes and citrus fruit and juices (orange, grapefruit, lemon).  Dont eat large meals, especially at night. Frequent, smaller meals are best. Do not lie down right after eating. And dont eat anything 3 hours before going to bed.  Avoid drinking alcohol and smoking. As much as possible, stay away from second hand smoke.  If you are overweight, losing weight will reduce symptoms.   Avoid wearing tight clothing around your stomach area.  If your symptoms occur during sleep, use a foam wedge to elevate your upper body (not just your head.) Or, place 4" blocks under the head of your bed.  Medicines  If needed, medicines can help relieve the symptoms of GERD and prevent damage to the esophagus. Discuss a medicine plan with your healthcare provider. This may include one or more of the following medicines:  Antacids to help " neutralize the normal acids in your stomach.  Acid blockers (H2 blockers) to decrease acid production.  Acid inhibitors (PPIs) to decrease acid production in a different way than the blockers. They may work better, but can take a little longer to take effect.  Take an antacid 30-60 minutes after eating and at bedtime, but not at the same time as an acid blocker.  Try not to take medicines such as ibuprofen and aspirin. If you are taking aspirin for your heart or other medical reasons, talk to your healthcare provider about stopping it.  Follow-up care  Follow up with your healthcare provider or as advised by our staff.  When to seek medical advice  Call your healthcare provider if any of the following occur:  Stomach pain gets worse or moves to the lower right abdomen (appendix area)  Chest pain appears or gets worse, or spreads to the back, neck, shoulder, or arm  Frequent vomiting (cant keep down liquids)  Blood in the stool or vomit (red or black in color)  Feeling weak or dizzy  Fever of 100.4ºF (38ºC) or higher, or as directed by your healthcare provider  Date Last Reviewed: 6/23/2015 © 2000-2017 [employer]. [address] [place] [id number]. All rights reserved. This information is not intended as a substitute for professional medical care. Always follow your healthcare professional's instructions.

## 2023-06-01 ENCOUNTER — ANESTHESIA EVENT (OUTPATIENT)
Dept: SURGERY | Facility: HOSPITAL | Age: 68
DRG: 026 | End: 2023-06-01
Payer: MEDICARE

## 2023-06-01 ENCOUNTER — TELEPHONE (OUTPATIENT)
Dept: OTOLARYNGOLOGY | Facility: CLINIC | Age: 68
End: 2023-06-01
Payer: MEDICARE

## 2023-06-02 ENCOUNTER — PATIENT MESSAGE (OUTPATIENT)
Dept: SURGERY | Facility: HOSPITAL | Age: 68
End: 2023-06-02
Payer: MEDICARE

## 2023-06-04 NOTE — ANESTHESIA PREPROCEDURE EVALUATION
[facility]  Anesthesia Pre-Operative Evaluation         Patient Name: Tona Zafar  YOB: 1955  MRN: [id number]    SUBJECTIVE:     Pre-operative evaluation for Procedure(s) (LRB):  REPAIR, CSF LEAK, CRANIUM, MIDDLE FOSSA APPROACH (Left)  REPAIR, CSF LEAK, CRANIUM, MIDDLE FOSSA APPROACH (Left)     06/04/2023    Tona Zafar is a 68 y.o. female w/ a significant PMHx of migraines, TIA, and chronic back pain.      Patient now presents for the above procedure(s) for repair of tegumen defect of skull associated with clear rhinorrhea and unilateral hearing loss.    Echo Summary  No results found for this or any previous visit.     Prev airway: None documented.    LDA: None documented.     Drips: None documented.      Patient Active Problem List   Diagnosis    Migraine with aura and without status migrainosus, not intractable    Eosinophilic esophagitis - EGD 4/24/18    TIA involving carotid artery    Idiopathic hypotension    Mastoiditis of left side    Lumbar disc disease    Suicide attempt    Tegmen defect of base of skull    Temporal encephalocele       Review of patient's allergies indicates:  No Known Allergies    Current Inpatient Medications:      No current facility-administered medications on file prior to encounter.     Current Outpatient Medications on File Prior to Encounter   Medication Sig Dispense Refill    magnesium 200 mg Tab Take by mouth once daily.      zinc 50 mg Tab Take 1 tablet by mouth once daily.         Past Surgical History:   Procedure Laterality Date    APPENDECTOMY      AUGMENTATION OF BREAST      BREAST SURGERY Bilateral 2005    Implants    CHOLECYSTECTOMY  06/2012    COLONOSCOPY  05/12/2020    Dr. Drew, in procedures; repeat in 6 months to check for healing of ischemic colitis    COLONOSCOPY  05/01/2018    Dr. Drew, in procedures    DENTAL SURGERY      DILATION AND CURETTAGE OF UTERUS      HIP SURGERY Right 2007     TONSILLECTOMY      UPPER GASTROINTESTINAL ENDOSCOPY  04/24/2018    Dr. Drew, in media    WRIST SURGERY Left 01/2014       Social History:  Tobacco Use: Medium Risk    Smoking Tobacco Use: Former    Smokeless Tobacco Use: Never    Passive Exposure: Not on file      Alcohol Use: Not on file        OBJECTIVE:     Vital Signs Range (Last 24H):         Significant Labs:  Lab Results   Component Value Date    WBC 5.09 05/01/2023    HGB 12.9 05/01/2023    HCT 41.4 05/01/2023     05/01/2023    CHOL 175 02/27/2020    TRIG 139 02/27/2020    HDL 42 02/27/2020    ALT 15 05/01/2023    AST 16 05/01/2023     05/01/2023    K 4.1 05/01/2023     05/01/2023    CREATININE 0.9 05/01/2023    BUN 18 05/01/2023    CO2 28 05/01/2023    TSH 0.710 05/10/2018    INR 0.9 05/01/2023    HGBA1C 5.4 05/10/2018       Diagnostic Studies: No relevant studies.    EKG:   Results for orders placed or performed during the hospital encounter of 04/14/23   EKG 12-lead    Collection Time: 04/14/23  8:11 PM    Narrative    Test Reason : M54.9,    Vent. Rate : 102 BPM     Atrial Rate : 102 BPM     P-R Int : 114 ms          QRS Dur : 078 ms      QT Int : 336 ms       P-R-T Axes : 000 -26 029 degrees     QTc Int : 437 ms    Sinus tachycardia  Low voltage QRS  Septal infarct ,age undetermined  Abnormal ECG  No previous ECGs available  Confirmed by Jerson Nance MD ([id number]) on 4/15/2023 7:21:56 AM    Referred By: MARIANA   SELF           Confirmed By:Jerson Nance MD       2D ECHO:  TTE:  No results found for this or any previous visit.    MERCY:  No results found for this or any previous visit.    ASSESSMENT/PLAN:           Pre-op Assessment    I have reviewed the Patient Summary Reports.     I have reviewed the Nursing Notes. I have reviewed the NPO Status.   I have reviewed the Medications.     Review of Systems  Anesthesia Hx:  No problems with previous Anesthesia  History of prior surgery of interest to airway management or planning: Denies  Family Hx of Anesthesia complications.   Denies Personal Hx of Anesthesia complications.   Hematology/Oncology:  Hematology Normal   Oncology Normal     EENT/Dental:EENT/Dental Normal   Cardiovascular:  Cardiovascular Normal     Pulmonary:  Pulmonary Normal    Renal/:  Renal/ Normal     Hepatic/GI:  Hepatic/GI Normal    Musculoskeletal:  Musculoskeletal Normal    Neurological:   TIA, Headaches    Endocrine:  Endocrine Normal    Dermatological:  Skin Normal    Psych:   Psychiatric History          Physical Exam  General: Well nourished    Airway:  Mallampati: II   Mouth Opening: Normal  TM Distance: Normal  Tongue: Normal  Neck ROM: Normal ROM    Dental:  Partial Dentures    Chest/Lungs:  Clear to auscultation, Normal Respiratory Rate    Heart:  Rate: Normal  Rhythm: Regular Rhythm  Sounds: Normal    Abdomen:  Normal, Nontender        Anesthesia Plan  Type of Anesthesia, risks & benefits discussed:    Anesthesia Type: Gen ETT  Intra-op Monitoring Plan: Standard ASA Monitors and Art Line  Post Op Pain Control Plan: multimodal analgesia  Induction:  IV  Airway Plan: Direct, Post-Induction  Informed Consent: Informed consent signed with the Patient and all parties understand the risks and agree with anesthesia plan.  All questions answered.   ASA Score: 3  Day of Surgery Review of History & Physical: H&P Update referred to the surgeon/provider.    Ready For Surgery From Anesthesia Perspective.     .

## 2023-06-05 ENCOUNTER — ANESTHESIA (OUTPATIENT)
Dept: SURGERY | Facility: HOSPITAL | Age: 68
DRG: 026 | End: 2023-06-05
Payer: MEDICARE

## 2023-06-05 ENCOUNTER — HOSPITAL ENCOUNTER (INPATIENT)
Facility: HOSPITAL | Age: 68
LOS: 2 days | Discharge: HOME HEALTH | DRG: 026 | End: 2023-06-07
Attending: OTOLARYNGOLOGY | Admitting: OTOLARYNGOLOGY
Payer: MEDICARE

## 2023-06-05 DIAGNOSIS — Q16.5 TEGMEN DEFECT OF BASE OF SKULL: ICD-10-CM

## 2023-06-05 PROBLEM — Z98.890 POST-OPERATIVE NAUSEA AND VOMITING: Status: ACTIVE | Noted: 2023-06-05

## 2023-06-05 PROBLEM — R11.2 POST-OPERATIVE NAUSEA AND VOMITING: Status: ACTIVE | Noted: 2023-06-05

## 2023-06-05 LAB
ABO + RH BLD: NORMAL
BLD GP AB SCN CELLS X3 SERPL QL: NORMAL
POCT GLUCOSE: 160 MG/DL (ref 70–110)
SPECIMEN OUTDATE: NORMAL

## 2023-06-05 PROCEDURE — 36620 ARTERIAL: ICD-10-PCS | Mod: 59,,, | Performed by: ANESTHESIOLOGY

## 2023-06-05 PROCEDURE — 27201037 HC PRESSURE MONITORING SET UP

## 2023-06-05 PROCEDURE — 37000009 HC ANESTHESIA EA ADD 15 MINS: Performed by: OTOLARYNGOLOGY

## 2023-06-05 PROCEDURE — 88305 TISSUE EXAM BY PATHOLOGIST: ICD-10-PCS | Mod: 26,,, | Performed by: PATHOLOGY

## 2023-06-05 PROCEDURE — 88305 TISSUE EXAM BY PATHOLOGIST: CPT | Mod: 26,,, | Performed by: PATHOLOGY

## 2023-06-05 PROCEDURE — D9220A PRA ANESTHESIA: ICD-10-PCS | Mod: ,,, | Performed by: ANESTHESIOLOGY

## 2023-06-05 PROCEDURE — 63600175 PHARM REV CODE 636 W HCPCS: Performed by: OTOLARYNGOLOGY

## 2023-06-05 PROCEDURE — 62121 INCISE SKULL REPAIR: CPT | Mod: 62,,, | Performed by: OTOLARYNGOLOGY

## 2023-06-05 PROCEDURE — 25000003 PHARM REV CODE 250: Performed by: STUDENT IN AN ORGANIZED HEALTH CARE EDUCATION/TRAINING PROGRAM

## 2023-06-05 PROCEDURE — 62121 INCISE SKULL REPAIR: CPT | Mod: 62,,, | Performed by: NEUROLOGICAL SURGERY

## 2023-06-05 PROCEDURE — 27201423 OPTIME MED/SURG SUP & DEVICES STERILE SUPPLY: Performed by: OTOLARYNGOLOGY

## 2023-06-05 PROCEDURE — 25000003 PHARM REV CODE 250: Performed by: OTOLARYNGOLOGY

## 2023-06-05 PROCEDURE — 63600175 PHARM REV CODE 636 W HCPCS: Performed by: STUDENT IN AN ORGANIZED HEALTH CARE EDUCATION/TRAINING PROGRAM

## 2023-06-05 PROCEDURE — 36000710: Performed by: OTOLARYNGOLOGY

## 2023-06-05 PROCEDURE — 20000000 HC ICU ROOM

## 2023-06-05 PROCEDURE — 36000711: Performed by: OTOLARYNGOLOGY

## 2023-06-05 PROCEDURE — 37000008 HC ANESTHESIA 1ST 15 MINUTES: Performed by: OTOLARYNGOLOGY

## 2023-06-05 PROCEDURE — 62272 PR SPINAL PUNCTURE,THERAPEUTIC DRAINAGE: ICD-10-PCS | Mod: 51,,, | Performed by: NEUROLOGICAL SURGERY

## 2023-06-05 PROCEDURE — 25000003 PHARM REV CODE 250: Performed by: NURSE PRACTITIONER

## 2023-06-05 PROCEDURE — 27800903 OPTIME MED/SURG SUP & DEVICES OTHER IMPLANTS: Performed by: OTOLARYNGOLOGY

## 2023-06-05 PROCEDURE — 62121 PR REPAIR ENCEPHALOCELE,BASE: ICD-10-PCS | Mod: 62,,, | Performed by: NEUROLOGICAL SURGERY

## 2023-06-05 PROCEDURE — 99291 CRITICAL CARE FIRST HOUR: CPT | Mod: ,,, | Performed by: NURSE PRACTITIONER

## 2023-06-05 PROCEDURE — 36620 INSERTION CATHETER ARTERY: CPT | Mod: 59,,, | Performed by: ANESTHESIOLOGY

## 2023-06-05 PROCEDURE — 88305 TISSUE EXAM BY PATHOLOGIST: CPT | Performed by: PATHOLOGY

## 2023-06-05 PROCEDURE — C1729 CATH, DRAINAGE: HCPCS | Performed by: OTOLARYNGOLOGY

## 2023-06-05 PROCEDURE — 63600175 PHARM REV CODE 636 W HCPCS: Performed by: NURSE PRACTITIONER

## 2023-06-05 PROCEDURE — 99291 PR CRITICAL CARE, E/M 30-74 MINUTES: ICD-10-PCS | Mod: ,,, | Performed by: NURSE PRACTITIONER

## 2023-06-05 PROCEDURE — C1713 ANCHOR/SCREW BN/BN,TIS/BN: HCPCS | Performed by: OTOLARYNGOLOGY

## 2023-06-05 PROCEDURE — 62121 PR REPAIR ENCEPHALOCELE,BASE: ICD-10-PCS | Mod: 62,,, | Performed by: OTOLARYNGOLOGY

## 2023-06-05 PROCEDURE — 62272 THER SPI PNXR DRG CSF: CPT | Mod: 51,,, | Performed by: NEUROLOGICAL SURGERY

## 2023-06-05 PROCEDURE — 86900 BLOOD TYPING SEROLOGIC ABO: CPT | Performed by: STUDENT IN AN ORGANIZED HEALTH CARE EDUCATION/TRAINING PROGRAM

## 2023-06-05 PROCEDURE — D9220A PRA ANESTHESIA: Mod: ,,, | Performed by: ANESTHESIOLOGY

## 2023-06-05 DEVICE — CEMENT HYDROSET INJ 10CC: Type: IMPLANTABLE DEVICE | Site: CRANIAL | Status: FUNCTIONAL

## 2023-06-05 DEVICE — DURAMATRIX ONLAY 2X2 MEMBRANE: Type: IMPLANTABLE DEVICE | Site: CRANIAL | Status: FUNCTIONAL

## 2023-06-05 DEVICE — PLATE BONE 2 HOLE TAB: Type: IMPLANTABLE DEVICE | Site: CRANIAL | Status: FUNCTIONAL

## 2023-06-05 DEVICE — MATRIX BONE SUB INJ 5ML HA: Type: IMPLANTABLE DEVICE | Site: CRANIAL | Status: FUNCTIONAL

## 2023-06-05 DEVICE — PLATE CRANIAL UN3 BOX LG 2X2: Type: IMPLANTABLE DEVICE | Site: CRANIAL | Status: FUNCTIONAL

## 2023-06-05 DEVICE — PLATE BONE BUR HOLE COVER 10MM: Type: IMPLANTABLE DEVICE | Site: CRANIAL | Status: FUNCTIONAL

## 2023-06-05 DEVICE — SCREW UN3 AXS SD 1.5X4MM: Type: IMPLANTABLE DEVICE | Site: CRANIAL | Status: FUNCTIONAL

## 2023-06-05 RX ORDER — NICARDIPINE HYDROCHLORIDE 0.2 MG/ML
0-15 INJECTION INTRAVENOUS CONTINUOUS
Status: DISCONTINUED | OUTPATIENT
Start: 2023-06-05 | End: 2023-06-06

## 2023-06-05 RX ORDER — PROPOFOL 10 MG/ML
VIAL (ML) INTRAVENOUS
Status: DISCONTINUED | OUTPATIENT
Start: 2023-06-05 | End: 2023-06-05

## 2023-06-05 RX ORDER — EPHEDRINE SULFATE 50 MG/ML
INJECTION, SOLUTION INTRAVENOUS
Status: DISCONTINUED | OUTPATIENT
Start: 2023-06-05 | End: 2023-06-05

## 2023-06-05 RX ORDER — SUCCINYLCHOLINE CHLORIDE 20 MG/ML
INJECTION INTRAMUSCULAR; INTRAVENOUS
Status: DISCONTINUED | OUTPATIENT
Start: 2023-06-05 | End: 2023-06-05

## 2023-06-05 RX ORDER — SODIUM CHLORIDE 0.9 % (FLUSH) 0.9 %
10 SYRINGE (ML) INJECTION
Status: DISCONTINUED | OUTPATIENT
Start: 2023-06-05 | End: 2023-06-07 | Stop reason: HOSPADM

## 2023-06-05 RX ORDER — ONDANSETRON 2 MG/ML
8 INJECTION INTRAMUSCULAR; INTRAVENOUS EVERY 8 HOURS PRN
Status: DISCONTINUED | OUTPATIENT
Start: 2023-06-05 | End: 2023-06-07 | Stop reason: HOSPADM

## 2023-06-05 RX ORDER — MUPIROCIN 20 MG/G
1 OINTMENT TOPICAL 2 TIMES DAILY
Status: DISCONTINUED | OUTPATIENT
Start: 2023-06-05 | End: 2023-06-05 | Stop reason: HOSPADM

## 2023-06-05 RX ORDER — SODIUM,POTASSIUM PHOSPHATES 280-250MG
2 POWDER IN PACKET (EA) ORAL
Status: DISCONTINUED | OUTPATIENT
Start: 2023-06-05 | End: 2023-06-07 | Stop reason: HOSPADM

## 2023-06-05 RX ORDER — LEVETIRACETAM 500 MG/1
500 TABLET ORAL 2 TIMES DAILY
Status: DISCONTINUED | OUTPATIENT
Start: 2023-06-05 | End: 2023-06-07 | Stop reason: HOSPADM

## 2023-06-05 RX ORDER — MUPIROCIN 20 MG/G
OINTMENT TOPICAL
Status: DISCONTINUED | OUTPATIENT
Start: 2023-06-05 | End: 2023-06-05 | Stop reason: HOSPADM

## 2023-06-05 RX ORDER — ACETAMINOPHEN 10 MG/ML
INJECTION, SOLUTION INTRAVENOUS
Status: DISCONTINUED | OUTPATIENT
Start: 2023-06-05 | End: 2023-06-05

## 2023-06-05 RX ORDER — ONDANSETRON 2 MG/ML
INJECTION INTRAMUSCULAR; INTRAVENOUS
Status: DISCONTINUED | OUTPATIENT
Start: 2023-06-05 | End: 2023-06-05

## 2023-06-05 RX ORDER — FAMOTIDINE 20 MG/1
20 TABLET, FILM COATED ORAL 2 TIMES DAILY
Status: DISCONTINUED | OUTPATIENT
Start: 2023-06-05 | End: 2023-06-06

## 2023-06-05 RX ORDER — MORPHINE SULFATE 2 MG/ML
2 INJECTION, SOLUTION INTRAMUSCULAR; INTRAVENOUS EVERY 4 HOURS PRN
Status: DISCONTINUED | OUTPATIENT
Start: 2023-06-05 | End: 2023-06-07 | Stop reason: HOSPADM

## 2023-06-05 RX ORDER — VASOPRESSIN 20 [USP'U]/ML
INJECTION, SOLUTION INTRAMUSCULAR; SUBCUTANEOUS
Status: DISCONTINUED | OUTPATIENT
Start: 2023-06-05 | End: 2023-06-05

## 2023-06-05 RX ORDER — HYDROXYZINE HYDROCHLORIDE 25 MG/1
25 TABLET, FILM COATED ORAL 3 TIMES DAILY PRN
Status: DISCONTINUED | OUTPATIENT
Start: 2023-06-05 | End: 2023-06-07 | Stop reason: HOSPADM

## 2023-06-05 RX ORDER — LANOLIN ALCOHOL/MO/W.PET/CERES
800 CREAM (GRAM) TOPICAL
Status: DISCONTINUED | OUTPATIENT
Start: 2023-06-05 | End: 2023-06-07 | Stop reason: HOSPADM

## 2023-06-05 RX ORDER — OXYCODONE HYDROCHLORIDE 5 MG/1
5 TABLET ORAL EVERY 4 HOURS PRN
Status: DISCONTINUED | OUTPATIENT
Start: 2023-06-05 | End: 2023-06-06

## 2023-06-05 RX ORDER — LIDOCAINE HYDROCHLORIDE AND EPINEPHRINE 10; 10 MG/ML; UG/ML
INJECTION, SOLUTION INFILTRATION; PERINEURAL
Status: DISCONTINUED | OUTPATIENT
Start: 2023-06-05 | End: 2023-06-05 | Stop reason: HOSPADM

## 2023-06-05 RX ORDER — SODIUM CHLORIDE 9 MG/ML
INJECTION, SOLUTION INTRAVENOUS CONTINUOUS
Status: DISCONTINUED | OUTPATIENT
Start: 2023-06-05 | End: 2023-06-06

## 2023-06-05 RX ORDER — CEFAZOLIN SODIUM 1 G/3ML
INJECTION, POWDER, FOR SOLUTION INTRAMUSCULAR; INTRAVENOUS
Status: DISCONTINUED | OUTPATIENT
Start: 2023-06-05 | End: 2023-06-05

## 2023-06-05 RX ORDER — DEXAMETHASONE SODIUM PHOSPHATE 4 MG/ML
INJECTION, SOLUTION INTRA-ARTICULAR; INTRALESIONAL; INTRAMUSCULAR; INTRAVENOUS; SOFT TISSUE
Status: DISCONTINUED | OUTPATIENT
Start: 2023-06-05 | End: 2023-06-05

## 2023-06-05 RX ORDER — FENTANYL CITRATE 50 UG/ML
INJECTION, SOLUTION INTRAMUSCULAR; INTRAVENOUS
Status: DISCONTINUED | OUTPATIENT
Start: 2023-06-05 | End: 2023-06-05

## 2023-06-05 RX ORDER — SODIUM CHLORIDE 9 MG/ML
INJECTION, SOLUTION INTRAVENOUS CONTINUOUS
Status: DISCONTINUED | OUTPATIENT
Start: 2023-06-05 | End: 2023-06-05

## 2023-06-05 RX ORDER — LIDOCAINE HYDROCHLORIDE 20 MG/ML
INJECTION, SOLUTION EPIDURAL; INFILTRATION; INTRACAUDAL; PERINEURAL
Status: DISCONTINUED | OUTPATIENT
Start: 2023-06-05 | End: 2023-06-05

## 2023-06-05 RX ORDER — PHENYLEPHRINE HCL IN 0.9% NACL 1 MG/10 ML
SYRINGE (ML) INTRAVENOUS
Status: DISCONTINUED | OUTPATIENT
Start: 2023-06-05 | End: 2023-06-05

## 2023-06-05 RX ORDER — ACETAMINOPHEN 325 MG/1
650 TABLET ORAL EVERY 4 HOURS PRN
Status: DISCONTINUED | OUTPATIENT
Start: 2023-06-05 | End: 2023-06-07 | Stop reason: HOSPADM

## 2023-06-05 RX ORDER — TALC
6 POWDER (GRAM) TOPICAL NIGHTLY PRN
Status: DISCONTINUED | OUTPATIENT
Start: 2023-06-05 | End: 2023-06-07 | Stop reason: HOSPADM

## 2023-06-05 RX ORDER — PROCHLORPERAZINE EDISYLATE 5 MG/ML
5 INJECTION INTRAMUSCULAR; INTRAVENOUS EVERY 6 HOURS PRN
Status: DISCONTINUED | OUTPATIENT
Start: 2023-06-05 | End: 2023-06-07 | Stop reason: HOSPADM

## 2023-06-05 RX ORDER — MANNITOL 20 G/100ML
INJECTION, SOLUTION INTRAVENOUS
Status: DISCONTINUED | OUTPATIENT
Start: 2023-06-05 | End: 2023-06-05

## 2023-06-05 RX ORDER — MIDAZOLAM HYDROCHLORIDE 1 MG/ML
INJECTION, SOLUTION INTRAMUSCULAR; INTRAVENOUS
Status: DISCONTINUED | OUTPATIENT
Start: 2023-06-05 | End: 2023-06-05

## 2023-06-05 RX ADMIN — MIDAZOLAM 2 MG: 1 INJECTION INTRAMUSCULAR; INTRAVENOUS at 07:06

## 2023-06-05 RX ADMIN — Medication 100 MCG: at 07:06

## 2023-06-05 RX ADMIN — FENTANYL CITRATE 25 MCG: 50 INJECTION INTRAMUSCULAR; INTRAVENOUS at 11:06

## 2023-06-05 RX ADMIN — ACETAMINOPHEN 1000 MG: 10 INJECTION, SOLUTION INTRAVENOUS at 11:06

## 2023-06-05 RX ADMIN — OXYCODONE HYDROCHLORIDE 5 MG: 5 TABLET ORAL at 06:06

## 2023-06-05 RX ADMIN — VASOPRESSIN 1 UNITS: 20 INJECTION INTRAVENOUS at 08:06

## 2023-06-05 RX ADMIN — VASOPRESSIN 1 UNITS: 20 INJECTION INTRAVENOUS at 09:06

## 2023-06-05 RX ADMIN — OXYCODONE HYDROCHLORIDE 5 MG: 5 TABLET ORAL at 10:06

## 2023-06-05 RX ADMIN — VASOPRESSIN 1 UNITS: 20 INJECTION INTRAVENOUS at 10:06

## 2023-06-05 RX ADMIN — EPHEDRINE SULFATE 10 MG: 50 INJECTION INTRAVENOUS at 08:06

## 2023-06-05 RX ADMIN — MORPHINE SULFATE 2 MG: 2 INJECTION, SOLUTION INTRAMUSCULAR; INTRAVENOUS at 01:06

## 2023-06-05 RX ADMIN — OXYCODONE HYDROCHLORIDE 5 MG: 5 TABLET ORAL at 02:06

## 2023-06-05 RX ADMIN — EPHEDRINE SULFATE 10 MG: 50 INJECTION INTRAVENOUS at 11:06

## 2023-06-05 RX ADMIN — SODIUM CHLORIDE, SODIUM GLUCONATE, SODIUM ACETATE, POTASSIUM CHLORIDE, MAGNESIUM CHLORIDE, SODIUM PHOSPHATE, DIBASIC, AND POTASSIUM PHOSPHATE: .53; .5; .37; .037; .03; .012; .00082 INJECTION, SOLUTION INTRAVENOUS at 07:06

## 2023-06-05 RX ADMIN — FENTANYL CITRATE 50 MCG: 50 INJECTION INTRAMUSCULAR; INTRAVENOUS at 07:06

## 2023-06-05 RX ADMIN — MUPIROCIN: 20 OINTMENT TOPICAL at 06:06

## 2023-06-05 RX ADMIN — SODIUM CHLORIDE 0.1 MCG/KG/MIN: 9 INJECTION, SOLUTION INTRAVENOUS at 07:06

## 2023-06-05 RX ADMIN — SODIUM CHLORIDE 0.2 MCG/KG/MIN: 9 INJECTION, SOLUTION INTRAVENOUS at 08:06

## 2023-06-05 RX ADMIN — ONDANSETRON 8 MG: 2 INJECTION INTRAMUSCULAR; INTRAVENOUS at 10:06

## 2023-06-05 RX ADMIN — EPHEDRINE SULFATE 10 MG: 50 INJECTION INTRAVENOUS at 09:06

## 2023-06-05 RX ADMIN — Medication 200 MCG: at 07:06

## 2023-06-05 RX ADMIN — VASOPRESSIN 2 UNITS: 20 INJECTION INTRAVENOUS at 09:06

## 2023-06-05 RX ADMIN — MIDAZOLAM 2 MG: 1 INJECTION INTRAMUSCULAR; INTRAVENOUS at 08:06

## 2023-06-05 RX ADMIN — LEVETIRACETAM 500 MG: 500 TABLET, FILM COATED ORAL at 05:06

## 2023-06-05 RX ADMIN — FAMOTIDINE 20 MG: 20 TABLET, FILM COATED ORAL at 08:06

## 2023-06-05 RX ADMIN — PROPOFOL 175 MG: 10 INJECTION, EMULSION INTRAVENOUS at 07:06

## 2023-06-05 RX ADMIN — MANNITOL 50 G: 20 INJECTION, SOLUTION INTRAVENOUS at 08:06

## 2023-06-05 RX ADMIN — LIDOCAINE HYDROCHLORIDE 50 MG: 20 INJECTION, SOLUTION EPIDURAL; INFILTRATION; INTRACAUDAL at 07:06

## 2023-06-05 RX ADMIN — SUCCINYLCHOLINE CHLORIDE 120 MG: 20 INJECTION, SOLUTION INTRAMUSCULAR; INTRAVENOUS; PARENTERAL at 07:06

## 2023-06-05 RX ADMIN — MORPHINE SULFATE 2 MG: 2 INJECTION, SOLUTION INTRAMUSCULAR; INTRAVENOUS at 09:06

## 2023-06-05 RX ADMIN — SODIUM CHLORIDE: 9 INJECTION, SOLUTION INTRAVENOUS at 02:06

## 2023-06-05 RX ADMIN — SODIUM CHLORIDE: 0.9 INJECTION, SOLUTION INTRAVENOUS at 07:06

## 2023-06-05 RX ADMIN — FENTANYL CITRATE 25 MCG: 50 INJECTION INTRAMUSCULAR; INTRAVENOUS at 12:06

## 2023-06-05 RX ADMIN — PROPOFOL 50 MG: 10 INJECTION, EMULSION INTRAVENOUS at 08:06

## 2023-06-05 RX ADMIN — CEFAZOLIN 2 G: 2 INJECTION, POWDER, FOR SOLUTION INTRAMUSCULAR; INTRAVENOUS at 07:06

## 2023-06-05 RX ADMIN — EPHEDRINE SULFATE 5 MG: 50 INJECTION INTRAVENOUS at 08:06

## 2023-06-05 RX ADMIN — VASOPRESSIN 2 UNITS: 20 INJECTION INTRAVENOUS at 11:06

## 2023-06-05 RX ADMIN — DEXAMETHASONE SODIUM PHOSPHATE 4 MG: 4 INJECTION INTRA-ARTICULAR; INTRALESIONAL; INTRAMUSCULAR; INTRAVENOUS; SOFT TISSUE at 10:06

## 2023-06-05 RX ADMIN — PROPOFOL 25 MG: 10 INJECTION, EMULSION INTRAVENOUS at 07:06

## 2023-06-05 RX ADMIN — PROMETHAZINE HYDROCHLORIDE 12.5 MG: 25 INJECTION, SOLUTION INTRAMUSCULAR; INTRAVENOUS at 01:06

## 2023-06-05 RX ADMIN — ONDANSETRON 4 MG: 2 INJECTION INTRAMUSCULAR; INTRAVENOUS at 10:06

## 2023-06-05 RX ADMIN — GLYCOPYRROLATE 0.2 MG: 0.2 INJECTION INTRAMUSCULAR; INTRAVENOUS at 07:06

## 2023-06-05 RX ADMIN — GLYCOPYRROLATE 0.2 MG: 0.2 INJECTION INTRAMUSCULAR; INTRAVENOUS at 09:06

## 2023-06-05 NOTE — CARE UPDATE
Patient arrived to [facility] from OR by ICU Bed    Report received from:  MD Sher    Type of stroke/diagnosis: Crani, CSF Leak     Current symptoms: lethargic d/t anesthesia, nausea  BG: 160     Skin assessment done: Y  Wounds noted: none    *If wounds noted, was Wound Care consulted? N    Wallace Completed? N - lethargic d/t anesthesia    Patient Belongings on Admit: none    NCC notified: OSCAR Sanchez

## 2023-06-05 NOTE — ASSESSMENT & PLAN NOTE
Tegmen defect of temporal bone, chronic CSF leak/encephalocele, symptoms started in 2018  S/p Left temporal craniotomy and repair of tegmen defect with cement and Intradural dura matrix inlay at site of encephalocele 6/5    -Admit to NCC for hourly neuro checks  -Vital signs q1hr  -SBP goal < 140  -NSGY, ENT following  -Post op CTH and CT temporal bone pending  -Multimodal pain control  -ADAT once passes bedside swallow  -Daily CBC, CMP, Mg, Phos  -Hourly I&Os  -Cedillo and arterial line post op  -Keppra 500mg BID

## 2023-06-05 NOTE — H&P
Patient ID: Tona Zafar is a 68 y.o. female.     Chief Complaint: Other (CSF LEAK )     HPI      Tona Zafar is a 68 y.o. female presents on evaluation from Dr. Arden Lim for left temporal bone CSF leak.  She reports several year history of left sided ear symptoms including pulsatile tinnitus, ear fullness, decreased hearing, and more recently unilateral rhinorrhea when bending over.  Symptoms present since at least 2018.  States has seen many doctors over the years but was never given a diagnosis.  No prior history of trauma or ear surgery.          Review of Systems   HENT:  Positive for ear pain, hearing loss and mouth sores.    Eyes: Negative.    Respiratory:  Positive for shortness of breath.    Cardiovascular: Negative.    Gastrointestinal:  Positive for abdominal pain, constipation and diarrhea.   Endocrine: Negative.    Genitourinary: Negative.    Musculoskeletal:  Positive for back pain and neck pain.   Integumentary:  Negative.   Allergic/Immunologic: Negative.    Neurological:  Positive for headaches.   Hematological: Negative.    Psychiatric/Behavioral: Negative.                 Objective         Physical Exam  Vitals and nursing note reviewed.   Constitutional:       Appearance: Normal appearance.   HENT:      Head: Normocephalic and atraumatic.      Right Ear: Tympanic membrane, ear canal and external ear normal. There is no impacted cerumen.      Left Ear: Ear canal and external ear normal. A middle ear effusion (opaque appearing TM with possible encephalocele tissue behind TM) is present. There is no impacted cerumen.   Neurological:      Mental Status: She is alert.      Data Reviewed:       Audiogram tracings independently reviewed and discussed with patient shows large left sided CHL     CT temporal bones image independently reviewed by me and show:    1. Redemonstrated chronic and complete opacification of the left middle ear cavity and mastoid air cells.  Multifocal regions of  extreme thinning or dehiscence involving the tegmen mastoideum and tegmen tympani.  Ossicles are intact and the bony labyrinth appears to be within normal limits.  2. Normal appearance of the right temporal bone.      MRI IAC:  Impression:     1. Presumed lateral skull base CSF leak through the tegmen via communicating tracts from the lateral ventricle as discussed above.  Suspected small cephalocele and superimposed fistula tract.  Redemonstrated complete opacification of the left mastoid and middle ear cavities.  These findings correlate with CT findings from 03/08/2023.  2. Otherwise negative contrast-enhanced MRI of the brain for age without evidence of an acute process.  Normal appearance of the inner ear structures/CP angles.     Labs:  Beta-2 testing: positive                 Assessment and Plan         Problem List Items Addressed This Visit    None  Visit Diagnoses         Mixed conductive and sensorineural hearing loss of left ear with restricted hearing of right ear    -  Primary     CSF leak from ear         Chronic serous otitis media of left ear         Left-sided headache         Temporal encephalocele                    In summary this is a pleasant 68 y.o. with a chronic left sided temporal bone CSF leak and encephalocele as evidenced by exam, imaging studies, and beta-2 testing     Recommend middle fossa craniotomy with repair of tegmen defect of skull base, left side     Discussed Left middle fossa approach  Discussed possibility of facial nerve paralysis, hearing loss, balance loss, stroke, brain hemorrhage, even death.  Risks, complications, alternatives and other potential problems discussed and patient expressed understanding.       Will schedule with Dr. Perez  Tentative surgical date of 5/28/23

## 2023-06-05 NOTE — SUBJECTIVE & OBJECTIVE
Past Medical History:   Diagnosis Date    Abnormal bone density screening     Colon polyps     Degenerative joint disease 02/05/2007    Diverticulosis     Gastroparesis 2018    History of chicken pox     History of depression 02/05/2007    Ischemic colitis 2020    Dr. Drew    Lumbar disc disease      Past Surgical History:   Procedure Laterality Date    APPENDECTOMY      AUGMENTATION OF BREAST      BREAST SURGERY Bilateral 2005    Implants    CHOLECYSTECTOMY  06/2012    COLONOSCOPY  05/12/2020    Dr. Drew, in procedures; repeat in 6 months to check for healing of ischemic colitis    COLONOSCOPY  05/01/2018    Dr. Drew, in procedures    DENTAL SURGERY      DILATION AND CURETTAGE OF UTERUS      HIP SURGERY Right 2007    TONSILLECTOMY      UPPER GASTROINTESTINAL ENDOSCOPY  04/24/2018    Dr. Drew, in media    WRIST SURGERY Left 01/2014       No current facility-administered medications on file prior to encounter.     Current Outpatient Medications on File Prior to Encounter   Medication Sig Dispense Refill    magnesium 200 mg Tab Take by mouth once daily.      zinc 50 mg Tab Take 1 tablet by mouth once daily.       Allergies: Patient has no known allergies.    Family History   Problem Relation Age of Onset    Diabetes Mother     Throat cancer Father     Colon cancer Maternal Grandfather     Breast cancer Neg Hx     Ovarian cancer Neg Hx     Crohn's disease Neg Hx     Esophageal cancer Neg Hx     Ulcerative colitis Neg Hx     Stomach cancer Neg Hx     Celiac disease Neg Hx        Social History     Tobacco Use    Smoking status: Former     Types: Cigarettes    Smokeless tobacco: Never   Substance Use Topics    Alcohol use: Yes     Alcohol/week: 1.0 standard drink     Types: 1 Glasses of wine per week     Comment: occasional    Drug use: No      Review of Systems: Review of Systems   Constitutional:  Negative for chills and fever.   HENT:  Positive for ear pain and hearing loss. Negative for congestion and ear  discharge.    Eyes:  Negative for blurred vision, double vision and photophobia.   Respiratory:  Negative for cough, sputum production and shortness of breath.    Cardiovascular:  Negative for chest pain and palpitations.   Gastrointestinal:  Positive for nausea and vomiting. Negative for heartburn.   Genitourinary:  Negative for dysuria, frequency and urgency.   Musculoskeletal:  Negative for back pain, joint pain and neck pain.   Skin:  Negative for itching and rash.   Neurological:  Positive for headaches. Negative for sensory change, speech change, focal weakness, seizures, loss of consciousness and weakness.     Vitals:   Temp: 98.4 °F (36.9 °C)  Pulse: 90  BP: (!) 113/58  MAP (mmHg): 82  Resp: (!) 23  SpO2: 98 %    Temp  Min: 97.9 °F (36.6 °C)  Max: 98.4 °F (36.9 °C)  Pulse  Min: 71  Max: 97  BP  Min: 113/58  Max: 143/73  MAP (mmHg)  Min: 82  Max: 103  Resp  Min: 17  Max: 36  SpO2  Min: 92 %  Max: 98 %    No intake/output data recorded.         Examination:   Constitutional: Well-nourished and -developed. No apparent distress.   Eyes: Conjunctiva clear, anicteric. Lids no lesions.  Head/Ears/Nose/Mouth/Throat/Neck: Moist mucous membranes. External ears, nose atraumatic. L ear dressing in place.  Cardiovascular: Regular rhythm. No leg edema.  Respiratory: Comfortable respirations. Clear to auscultation.  Gastrointestinal: Soft, nondistended, nontender. + bowel sounds.    Neurologic:   -E 4 V 5 M 6  -Drowsy. Oriented to person, place, and time. Speech fluent. Follows commands.   -Cranial nerves: EOM intact, PERRL, no facial droop, + cough  -Strength: Moves all extremities spontaneously, antigravity  -Sensation: Intact to light touch.    Today I independently reviewed pertinent medications, lines/drains/airways, imaging, cardiology results, laboratory results, microbiology results, notably:   Pre op labs, post labs and imaging pending

## 2023-06-05 NOTE — PLAN OF CARE
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]    Admission Date: 6/5/2023  Expected Discharge Date: 6/9/2023    Transition of Care Barriers: None    Payor: HU[employer] MANAGED MEDICARE / Plan: HU[employer] MEDICARE HMO / Product Type: Capitation /     Extended Emergency Contact Information  Primary Emergency Contact: Mario Alberto Zafar  Address: [address] [address]  Mobile Phone: [phone]  Relation: Spouse    Discharge Plan A: Home with family  Discharge Plan B: Home Health      [id number] - [place], [place]  [address]  [address]  [facility] [address]  Phone: [phone] Fax: [phone]      Transferred from:  home    Past Medical History:   Diagnosis Date    Abnormal bone density screening     Colon polyps     Degenerative joint disease 02/05/2007    Diverticulosis     Gastroparesis 2018    History of chicken pox     History of depression 02/05/2007    Ischemic colitis 2020    Dr. Drew    Lumbar disc disease          CM met with patient in room for Discharge Planning Assessment.  Patient is unable to answer questions.  Phone call to Mario Alberto Zafar () [phone].  Per , the patient lives with  in a single story house with 0 step(s) to enter.   Per , the patient was independent with ADLS and used no dme for ambulation.  Patient will have assistance from her  upon discharge.   Discharge Planning Booklet given to patient/family and discussed.  All questions addressed.  CM will follow for needs.      Initial Assessment (most recent)       Adult Discharge Assessment - 06/05/23 1410          Discharge Assessment    Assessment Type Discharge Planning Assessment     Confirmed/corrected address, phone number and insurance Yes      Confirmed Demographics Correct on Facesheet     Source of Information unable to respond     If unable to respond/provide information was family/caregiver contacted? Yes     Contact Name/Number Mario Alberto Zafar () [phone]     Communicated FARZANEH with patient/caregiver Date not available/Unable to determine     Reason For Admission Tegmen defect of base of skull     People in Home spouse     Facility Arrived From: home     Do you expect to return to your current living situation? Yes     Do you have help at home or someone to help you manage your care at home? Yes     Who are your caregiver(s) and their phone number(s)? Mario Alberto Zafar () [phone]     Prior to hospitilization cognitive status: Alert/Oriented     Current cognitive status: Unable to Assess     Walking or Climbing Stairs --   independent    Dressing/Bathing --   independent    Home Accessibility stairs to enter home     Number of Stairs, Main Entrance none     Stairs Comment, Main Entrance none     Home Layout Able to live on 1st floor     Equipment Currently Used at Home none     Readmission within 30 days? No     Patient currently being followed by outpatient case management? No     Do you currently have service(s) that help you manage your care at home? No     Do you take prescription medications? Yes     Do you have prescription coverage? Yes     Coverage [employer]     Do you have any problems affording any of your prescribed medications? No     Is the patient taking medications as prescribed? yes     Who is going to help you get home at discharge? Mario Alberto Zafar () [phone]     How do you get to doctors appointments? family or friend will provide     Are you on dialysis? No     Do you take coumadin? No     Discharge Plan A Home with family     Discharge Plan B Home Health     DME Needed Upon Discharge  none     Discharge Plan discussed with: Spouse/sig other     Name(s) and Number(s) Mario Alberto Zafar () [phone]      Transition of Care Barriers None        Physical Activity    On average, how many days per week do you engage in moderate to strenuous exercise (like a brisk walk)? 5 days     On average, how many minutes do you engage in exercise at this level? 60 min        Financial Resource Strain    How hard is it for you to pay for the very basics like food, housing, medical care, and heating? Not hard at all        Housing Stability    In the last 12 months, was there a time when you were not able to pay the mortgage or rent on time? No     In the last 12 months, how many places have you lived? 1     In the last 12 months, was there a time when you did not have a steady place to sleep or slept in a shelter (including now)? No        Transportation Needs    In the past 12 months, has lack of transportation kept you from medical appointments or from getting medications? No     In the past 12 months, has lack of transportation kept you from meetings, work, or from getting things needed for daily living? No        Food Insecurity    Within the past 12 months, you worried that your food would run out before you got the money to buy more. Never true     Within the past 12 months, the food you bought just didn't last and you didn't have money to get more. Never true        Stress    Do you feel stress - tense, restless, nervous, or anxious, or unable to sleep at night because your mind is troubled all the time - these days? --   kassandra       Social Connections    In a typical week, how many times do you talk on the phone with family, friends, or neighbors? Three times a week     How often do you get together with friends or relatives? More than three times a week   Lives with     How often do you attend Lutheran or Adventism services? Never     Do you belong to any clubs or organizations such as Lutheran groups, unions, fraternal or athletic groups, or school groups? No     How often do you attend meetings of the clubs or  organizations you belong to? Never     Are you , , , , never , or living with a partner?         Alcohol Use    Q1: How often do you have a drink containing alcohol? --   kassandra    Q2: How many drinks containing alcohol do you have on a typical day when you are drinking? --   kassandra\    Q3: How often do you have six or more drinks on one occasion? --   kassandra                         Tammy Gonzalez RN, CCRN-K, [facility]  Neuro-Critical Care   [phone]

## 2023-06-05 NOTE — OP NOTE
[facility] - Surgery ([facility])  Surgery Department  Operative Note    SUMMARY     Date of Procedure: 6/5/2023     Procedure:   Middle fossa craniotomy, left side  Repair of encephalocele and CSF leak , left side  3. 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  Incomalleolar complex visible through defect, appeare mobile.  Defect 1.5 by 0.5cm in size  Repaired using fascia covered by hydroset bone cement  Dural defect repaired with duramatrix inlay graft    Description of Technical Procedures:   The patient was taken to the operating room, placed under general anesthetic and intubated without difficulty. The [facility] facial nerve monitoring electrodes were positioned and monitoring was performed throughout the procedure.   The patient was positioned in the supine position on the OR table and the head was turned to the right and the left scalp area is steriley prepped and draped.  An incision is marked in the preauricular and scalp area and injected with local anestehtic.      The incision was made with a 15 blade down to the temporalis fascia layer.  A anterior based skin flap was raised.  The skin and soft tissue were then retracted with stay sutures.  A temporalis fascia graft was harvested and set aside for later use using scissors.  Then a anterior based temporalis flap was incised down to the  cranium this was elevated off of the cranium and retracted anteriorly inferiorly.  The zygomatic root was identified and used as a landmark.    A 5 x 5 cm craniotomy was made by the Neurosurgery team using a craniotome.  It was centered at the zygomatic root.  The bone flap was removed and set aside in saline.  All bleeding was controlled prior to elevating the dura off the middle fossa floor.    The operating microscope was brought into the field and used for elevation of the dura off the middle fossa floor.  Working in a posterior to anterior and a lateral to medial direction the middle fossa floor was exposed.  Full exposure of the middle fossa floor was accomplished with identification of the arcuate eminence, geniculate ganglion, and petrous ridge.  There was a defect identified it was a large opening in the mastoid tegmen extending from the antrum area to involve the tegmen tympani, approximately 1.5 x 0.5cm.  the ossicles were visible through defect and appeared intact and mobile. .  Once the defect had been fully exposed and  from the temporal lobe.  The middle fossa [employer] retractor was brought into the field.  Blade was fixated the petrous ridge and retracted the temporal lobe.  A layer of fascia was placed over the defect covering all edges.  Saline was used to help this adhere.  Then the HydroSet bone cement was mixed according to manufacture guidelines.  This was then placed over the fascia in molded into position.  The remaining HydroSet was used to cover the whole middle fossa floor except for the area of the geniculate ganglion.  This was allowed to harden.  After it had hardened the entire defect was being covered.  The retractor was then carefully removed.      A dural inlay graft was performed by the Neurosurgery team.  A small incision was made along the dura of the temporal lobe.  And a sheet of dura met matrix was slipped under the temporal lobe covering the dural opening.  The  incision was closed with Nurolon sutures.    The bone flap was fixated using titanium plates and screws.  In a dural sealant was applied.  The temporalis muscle was reapproximated using Vicryl sutures.  The galea layer was also closed with Vicryl sutures.  The skin was closed with a running nylon suture.  And a pressure dressing was applied.  The patient was turned over to Anesthesia awakened from the operation.      Estimated Blood Loss (EBL): 100ml           Implants:   Implant Name Type Inv. Item Serial No.  Lot No. LRB No. Used Action   DURAMATRIX ONLAY 2X2 MEMBRANE - [id number]  DURAMATRIX ONLAY 2X2 MEMBRANE  ORQUIDEA [employer]rev[employer] PRICILLA. [id number] Left 1 Implanted   CEMENT HYDROSET INJ 10CC - [id number] Bone CEMENT HYDROSET INJ 10CC [id number] ORQUIDEA [employer]rev[employer] PRICILLA.  Left 1 Implanted   2-hole dogbone plat w/Tab Plate     Left 1 Implanted   Jose Hole Cover Plate Plate     Left 2 Implanted   Box Plate, 2x2 hole, Large Plate     Left 1 Implanted   MATRIX BONE SUB INJ 5ML HA - [id number] Bone MATRIX BONE SUB INJ 5ML HA  ORQUIDEA Strev[employer] PRICILLA. [id number] Left 1 Implanted   SCREW UN3 AXS SD 1.5X4MM - [id number]  SCREW UN3 AXS SD 1.5X4MM  ORQUIDEA Strev[employer] PRICILLA.   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 Left Temporal Encephalocele - permanent     References:    Click here for ordering Quick Tip   Question Answer Comment   Procedure Type: ENT    Specimen Class: Routine/Screening    Which provider would you like to cc? KEYON INIGUEZ    Which provider would you like to cc? JOSÉ CONWAY    Release to patient Immediate        06/05/23 1057                            Condition:  Good    Disposition: PACU - hemodynamically stable.    Attestation: I was present and scrubbed for the entire procedure.

## 2023-06-05 NOTE — ANESTHESIA PROCEDURE NOTES
Arterial    Diagnosis: CSF leak    Patient location during procedure: done in OR  Procedure start time: 6/5/2023 7:25 AM  Timeout: 6/5/2023 7:25 AM  Procedure end time: 6/5/2023 7:30 AM    Staffing  Authorizing Provider: Iqra Coronel MD  Performing Provider: Nadja Olivarez MD    Anesthesiologist was present at the time of the procedure.    Preanesthetic Checklist  Completed: patient identified, IV checked, risks and benefits discussed, monitors and equipment checked, pre-op evaluation and anesthesia consent givenArterial  Skin Prep: chlorhexidine gluconate  Local Infiltration: none  Orientation: left  Location: radial    Catheter Size: 20 G  Catheter placement by Anatomical landmarks. Heme positive aspiration all ports. Insertion Attempts: 1  Assessment  Dressing: secured with tape and tegaderm  Patient: Tolerated well

## 2023-06-05 NOTE — ANESTHESIA PROCEDURE NOTES
Intubation    Date/Time: 6/5/2023 7:17 AM  Performed by: Nadja Olivarez MD  Authorized by: Iqra Coronel MD     Intubation:     Induction:  Intravenous    Intubated:  Postinduction    Mask Ventilation:  Easy mask    Attempts:  1    Attempted By:  Resident anesthesiologist    Method of Intubation:  Video laryngoscopy    Blade:  Kent 3    Laryngeal View Grade: Grade I - full view of cords      Difficult Airway Encountered?: No      Complications:  None    Airway Device:  Oral endotracheal tube    Airway Device Size:  7.0    Style/Cuff Inflation:  Cuffed (inflated to minimal occlusive pressure)    Tube secured:  22    Secured at:  The lips    Placement Verified By:  Capnometry    Complicating Factors:  None    Findings Post-Intubation:  Atraumatic/condition of teeth unchanged and BS equal bilateral     Spoke with patient, results and PCP recommendations discussed.     Rx for Tricor started and RN discussed diet modifications.     Patient verbalized understanding and no further questions at this time.

## 2023-06-05 NOTE — ANESTHESIA POSTPROCEDURE EVALUATION
Anesthesia Post Evaluation    Patient: Tona Zafar    Procedure(s) Performed: Procedure(s) (LRB):  REPAIR, CSF LEAK, CRANIUM, MIDDLE FOSSA APPROACH (Left)  REPAIR, CSF LEAK, CRANIUM, MIDDLE FOSSA APPROACH (Left)  LUMBAR PUNCTURE (N/A)    Final Anesthesia Type: general      Patient location during evaluation: ICU  Patient participation: Yes- Able to Participate  Level of consciousness: awake  Post-procedure vital signs: reviewed and stable  Pain management: adequate (Tylenol IV given prior to transport for c/o headache)  Airway patency: patent    PONV status at discharge: vomiting (controlled) and nausea (controlled) (haldol administered prior to transport for c/o nausea)  Anesthetic complications: no      Cardiovascular status: blood pressure returned to baseline  Respiratory status: unassisted and spontaneous ventilation  Hydration status: euvolemic  Follow-up not needed.          Vitals Value Taken Time   /53 06/05/23 1319   Temp 36.9 °C (98.4 °F) 06/05/23 1221   Pulse 95 06/05/23 1329   Resp 22 06/05/23 1329   SpO2 99 % 06/05/23 1329   Vitals shown include unvalidated device data.      No case tracking events are documented in the log.      Pain/Elba Score: No data recorded

## 2023-06-05 NOTE — H&P
[facility] - Neuro Critical Care  Neurocritical Care  History & Physical    Admit Date: 6/5/2023  Service Date: 06/05/2023  Length of Stay: 0    Subjective:     Chief Complaint: Tegmen defect of base of skull    History of Present Illness: Tona Zafar is a 68 y.o. female with chronic left temporal bone CSF leak. She has had left sided ear symptoms including pulsatile tinnitus, ear fullness, decreased hearing, and more recently unilateral rhinorrhea when bending over since at least 2018. She was diagnosed with encephalocele. She underwent Left temporal craniotomy and repair of tegmen defect with cement and intradural dura matrix inlay at site of encephalocele on 6/5. Admitted to [facility] post op for close monitoring.    Past Medical History:   Diagnosis Date    Abnormal bone density screening     Colon polyps     Degenerative joint disease 02/05/2007    Diverticulosis     Gastroparesis 2018    History of chicken pox     History of depression 02/05/2007    Ischemic colitis 2020    Dr. Drew    Lumbar disc disease      Past Surgical History:   Procedure Laterality Date    APPENDECTOMY      AUGMENTATION OF BREAST      BREAST SURGERY Bilateral 2005    Implants    CHOLECYSTECTOMY  06/2012    COLONOSCOPY  05/12/2020    Dr. Drew, in procedures; repeat in 6 months to check for healing of ischemic colitis    COLONOSCOPY  05/01/2018    Dr. Drew, in procedures    DENTAL SURGERY      DILATION AND CURETTAGE OF UTERUS      HIP SURGERY Right 2007    TONSILLECTOMY      UPPER GASTROINTESTINAL ENDOSCOPY  04/24/2018    Dr. Drew, in media    WRIST SURGERY Left 01/2014       No current facility-administered medications on file prior to encounter.     Current Outpatient Medications on File Prior to Encounter   Medication Sig Dispense Refill    magnesium 200 mg Tab Take by mouth once daily.      zinc 50 mg Tab Take 1 tablet by mouth once daily.       Allergies: Patient has no known allergies.    Family History   Problem Relation Age of  Onset    Diabetes Mother     Throat cancer Father     Colon cancer Maternal Grandfather     Breast cancer Neg Hx     Ovarian cancer Neg Hx     Crohn's disease Neg Hx     Esophageal cancer Neg Hx     Ulcerative colitis Neg Hx     Stomach cancer Neg Hx     Celiac disease Neg Hx        Social History     Tobacco Use    Smoking status: Former     Types: Cigarettes    Smokeless tobacco: Never   Substance Use Topics    Alcohol use: Yes     Alcohol/week: 1.0 standard drink     Types: 1 Glasses of wine per week     Comment: occasional    Drug use: No      Review of Systems: Review of Systems   Constitutional:  Negative for chills and fever.   HENT:  Positive for ear pain and hearing loss. Negative for congestion and ear discharge.    Eyes:  Negative for blurred vision, double vision and photophobia.   Respiratory:  Negative for cough, sputum production and shortness of breath.    Cardiovascular:  Negative for chest pain and palpitations.   Gastrointestinal:  Positive for nausea and vomiting. Negative for heartburn.   Genitourinary:  Negative for dysuria, frequency and urgency.   Musculoskeletal:  Negative for back pain, joint pain and neck pain.   Skin:  Negative for itching and rash.   Neurological:  Positive for headaches. Negative for sensory change, speech change, focal weakness, seizures, loss of consciousness and weakness.     Vitals:   Temp: 98.4 °F (36.9 °C)  Pulse: 90  BP: (!) 113/58  MAP (mmHg): 82  Resp: (!) 23  SpO2: 98 %    Temp  Min: 97.9 °F (36.6 °C)  Max: 98.4 °F (36.9 °C)  Pulse  Min: 71  Max: 97  BP  Min: 113/58  Max: 143/73  MAP (mmHg)  Min: 82  Max: 103  Resp  Min: 17  Max: 36  SpO2  Min: 92 %  Max: 98 %    No intake/output data recorded.         Examination:   Constitutional: Well-nourished and -developed. No apparent distress.   Eyes: Conjunctiva clear, anicteric. Lids no lesions.  Head/Ears/Nose/Mouth/Throat/Neck: Moist mucous membranes. External ears, nose atraumatic. L ear dressing in  place.  Cardiovascular: Regular rhythm. No leg edema.  Respiratory: Comfortable respirations. Clear to auscultation.  Gastrointestinal: Soft, nondistended, nontender. + bowel sounds.    Neurologic:   -E 4 V 5 M 6  -Drowsy. Oriented to person, place, and time. Speech fluent. Follows commands.   -Cranial nerves: EOM intact, PERRL, no facial droop, + cough  -Strength: Moves all extremities spontaneously, antigravity  -Sensation: Intact to light touch.    Today I independently reviewed pertinent medications, lines/drains/airways, imaging, cardiology results, laboratory results, microbiology results, notably:   Pre op labs, post labs and imaging pending  Assessment/Plan:     Neuro  Temporal encephalocele  See Tegmen defect     ENT  * Tegmen defect of base of skull  Tegmen defect of temporal bone, chronic CSF leak/encephalocele, symptoms started in 2018  S/p Left temporal craniotomy and repair of tegmen defect with cement and Intradural dura matrix inlay at site of encephalocele 6/5    -Admit to [facility] for hourly neuro checks  -Vital signs q1hr  -SBP goal < 140  -NSGY, ENT following  -Post op CTH and CT temporal bone pending  -Multimodal pain control  -ADAT once passes bedside swallow  -Daily CBC, CMP, Mg, Phos  -Hourly I&Os  -Cedillo and arterial line post op  -Keppra 500mg BID    GI  Post-operative nausea and vomiting  Received zofran, haldol in recovery  Phenergan x 1 dose  Zofran 8mg q8hrs prn  ADAT    The patient is being Prophylaxed for:  Venous Thromboembolism with: Mechanical  Stress Ulcer with: Not Applicable   Ventilator Pneumonia with: not applicable    Activity Orders            Progressive Mobility Protocol (mobilize patient to their highest level of functioning at least twice daily) starting at 06/05 2000    Turn patient starting at 06/05 1400    Elevate HOB 30 starting at 06/05 1147    Diet Adult Regular (IDDSI Level 7): Regular starting at 06/05 1115          Full Code    Critical condition in that Patient has  a condition that poses threat to life and bodily function: CSF Leak repair requiring frequent neuro monitoring     37 minutes of Critical care time was spent personally by me on the following activities: development of treatment plan with patient or surrogate and bedside caregivers, discussions with consultants, evaluation of patient's response to treatment, examination of patient, ordering and performing treatments and interventions, ordering and review of laboratory studies, ordering and review of radiographic studies, pulse oximetry, antibiotic titration if applicable, vasopressor titration if applicable, re-evaluation of patient's condition. This critical care time did not overlap with that of any other provider or involve time for any procedures. There is high probability for acute neurological change leading to clinical and possibly life-threatening deterioration requiring highest level of physician preparedness for urgent intervention.    Vanita Sanchez NP  Neurocritical Care  [facility] - Neuro Critical Care

## 2023-06-05 NOTE — BRIEF OP NOTE
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 Left Temporal Encephalocele - permanent     References:    Click here for ordering Quick Tip   Question Answer Comment   Procedure Type: ENT    Specimen Class: Routine/Screening    Which provider would you like to cc? GASPER GRIFFITH    Which provider would you like to cc? RANGEL CONWAY    Release to patient Immediate        06/05/23 1054                    [id number]

## 2023-06-05 NOTE — BRIEF OP NOTE
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]    Procedure(s) (LRB):  REPAIR, CSF LEAK, CRANIUM, MIDDLE FOSSA APPROACH (Left)  REPAIR, CSF LEAK, CRANIUM, MIDDLE FOSSA APPROACH (Left)  LUMBAR PUNCTURE (N/A)    Anesthesia: General    Operative Findings:   See full op note  Middle craniotomy for repair of MCF encephalocele  Closed with fascia, hydrocet, and duramatrix inlay    Estimated Blood Loss: * No values recorded between 6/5/2023  8:21 AM and 6/5/2023 11:47 AM *         Specimens:   Specimen (24h ago, onward)       Start     Ordered    06/05/23 1056  Specimen to Pathology, Surgery ENT  Once        Comments: Pre-op Diagnosis: CSF leak from ear [G96.01]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 Left Temporal Encephalocele - permanent     References:    Click here for ordering Quick Tip   Question Answer Comment   Procedure Type: ENT    Specimen Class: Routine/Screening    Which provider would you like to cc? GASPER GRIFFITH    Which provider would you like to cc? RANGEL CONWAY    Release to patient Immediate        06/05/23 8276

## 2023-06-05 NOTE — PLAN OF CARE
[facility] Care Plan    POC reviewed with Tona Zafar and family at 1400. Pt verbalized understanding. Questions and concerns addressed. No acute events today. Pt progressing toward goals. Will continue to monitor. See below and flowsheets for full assessment and VS info.   CT done. Vital signs stable. NS at 75.            Is this a stroke patient? no    Neuro:  Estero Coma Scale  Best Eye Response: 4-->(E4) spontaneous  Best Motor Response: 6-->(M6) obeys commands  Best Verbal Response: 5-->(V5) oriented  Mily Coma Scale Score: 15  Pupil PERRLA: yes     24 hr Temp:  [97.9 °F (36.6 °C)-98.6 °F (37 °C)]     CV:   Rhythm: normal sinus rhythm  BP goals:   SBP < 140  MAP > 65    Resp:           Plan: N/A    GI/:     Diet/Nutrition Received: regular  Last Bowel Movement: 06/04/23       Intake/Output Summary (Last 24 hours) at 6/5/2023 1717  Last data filed at 6/5/2023 1705  Gross per 24 hour   Intake 3012.74 ml   Output 1790 ml   Net 1222.74 ml          Labs/Accuchecks:  No results for input(s): WBC, RBC, HGB, HCT, PLT in the last 168 hours. No results for input(s): NA, K, CO2, CL, BUN, CREATININE, ALKPHOS, ALT, AST, BILITOT in the last 168 hours.    Invalid input(s): [id number] No results for input(s): PROTIME, INR, APTT, HEPANTIXA in the last 168 hours. No results for input(s): CPK, CPKMB, TROPONINI, MB in the last 168 hours.    Electrolytes: N/A - electrolytes WDL  Accuchecks: none    Gtts:   sodium chloride 0.9% 75 mL/hr at 06/05/23 1705    nicardipine Stopped (06/05/23 1215)       LDA/Wounds:  Lines/Drains/Airways       Drain  Duration                  Urethral Catheter 06/05/23 0725 Non-latex 16 Fr. <1 day              Arterial Line  Duration             Arterial Line 06/05/23 0725 Left Radial <1 day              Peripheral Intravenous Line  Duration                  Peripheral IV - Single Lumen 06/05/23 0602 18 G Anterior;Distal;Right Forearm <1 day                  Wounds: No  Wound care consulted: No

## 2023-06-05 NOTE — TRANSFER OF CARE
"Anesthesia Transfer of Care Note    Patient: Tona Zafar    Procedure(s) Performed: Procedure(s) (LRB):  REPAIR, CSF LEAK, CRANIUM, MIDDLE FOSSA APPROACH (Left)  REPAIR, CSF LEAK, CRANIUM, MIDDLE FOSSA APPROACH (Left)  LUMBAR PUNCTURE (N/A)    Patient location: ICU    Anesthesia Type: general    Transport from OR: Transported from OR on 6-10 L/min O2 by face mask with adequate spontaneous ventilation    Post pain: adequate analgesia    Post assessment: no apparent anesthetic complications    Post vital signs: stable    Level of consciousness: awake, alert and oriented    Nausea/Vomiting: nausea and vomiting    Complications: none    Transfer of care protocol was followedComments: Patient given Zofran, Decadron, and Haldol      Last vitals:   Visit Vitals  /63   Pulse 94   Temp 36.9 °C (98.4 °F) (Oral)   Resp (!) 33   Ht 5' 10" (1.778 m)   Wt 77.1 kg (170 lb)   SpO2 95%   Breastfeeding No   BMI 24.39 kg/m²     "

## 2023-06-05 NOTE — HPI
Tona Zafar is a 68 y.o. female with chronic left temporal bone CSF leak. She has had left sided ear symptoms including pulsatile tinnitus, ear fullness, decreased hearing, and more recently unilateral rhinorrhea when bending over since at least 2018. She was diagnosed with encephalocele. She underwent Left temporal craniotomy and repair of tegmen defect with cement and intradural dura matrix inlay at site of encephalocele on 6/5. Admitted to [facility] post op for close monitoring.

## 2023-06-06 LAB
ALBUMIN SERPL BCP-MCNC: 2.2 G/DL (ref 3.5–5.2)
ALBUMIN SERPL BCP-MCNC: 2.8 G/DL (ref 3.5–5.2)
ALP SERPL-CCNC: 45 U/L (ref 55–135)
ALP SERPL-CCNC: 61 U/L (ref 55–135)
ALT SERPL W/O P-5'-P-CCNC: 12 U/L (ref 10–44)
ALT SERPL W/O P-5'-P-CCNC: 9 U/L (ref 10–44)
ANION GAP SERPL CALC-SCNC: 5 MMOL/L (ref 8–16)
ANION GAP SERPL CALC-SCNC: 8 MMOL/L (ref 8–16)
AST SERPL-CCNC: 18 U/L (ref 10–40)
AST SERPL-CCNC: 21 U/L (ref 10–40)
BASOPHILS # BLD AUTO: 0.01 K/UL (ref 0–0.2)
BASOPHILS # BLD AUTO: 0.02 K/UL (ref 0–0.2)
BASOPHILS NFR BLD: 0.1 % (ref 0–1.9)
BASOPHILS NFR BLD: 0.2 % (ref 0–1.9)
BILIRUB SERPL-MCNC: 0.4 MG/DL (ref 0.1–1)
BILIRUB SERPL-MCNC: 0.5 MG/DL (ref 0.1–1)
BUN SERPL-MCNC: 7 MG/DL (ref 8–23)
BUN SERPL-MCNC: 9 MG/DL (ref 8–23)
CALCIUM SERPL-MCNC: 6.2 MG/DL (ref 8.7–10.5)
CALCIUM SERPL-MCNC: 8.1 MG/DL (ref 8.7–10.5)
CHLORIDE SERPL-SCNC: 111 MMOL/L (ref 95–110)
CHLORIDE SERPL-SCNC: 121 MMOL/L (ref 95–110)
CO2 SERPL-SCNC: 18 MMOL/L (ref 23–29)
CO2 SERPL-SCNC: 23 MMOL/L (ref 23–29)
CREAT SERPL-MCNC: 0.6 MG/DL (ref 0.5–1.4)
CREAT SERPL-MCNC: 0.8 MG/DL (ref 0.5–1.4)
DIFFERENTIAL METHOD: ABNORMAL
DIFFERENTIAL METHOD: ABNORMAL
EOSINOPHIL # BLD AUTO: 0 K/UL (ref 0–0.5)
EOSINOPHIL # BLD AUTO: 0 K/UL (ref 0–0.5)
EOSINOPHIL NFR BLD: 0 % (ref 0–8)
EOSINOPHIL NFR BLD: 0 % (ref 0–8)
ERYTHROCYTE [DISTWIDTH] IN BLOOD BY AUTOMATED COUNT: 14.4 % (ref 11.5–14.5)
ERYTHROCYTE [DISTWIDTH] IN BLOOD BY AUTOMATED COUNT: 14.5 % (ref 11.5–14.5)
EST. GFR  (NO RACE VARIABLE): >60 ML/MIN/1.73 M^2
EST. GFR  (NO RACE VARIABLE): >60 ML/MIN/1.73 M^2
GLUCOSE SERPL-MCNC: 105 MG/DL (ref 70–110)
GLUCOSE SERPL-MCNC: 118 MG/DL (ref 70–110)
HCT VFR BLD AUTO: 29.1 % (ref 37–48.5)
HCT VFR BLD AUTO: 33.8 % (ref 37–48.5)
HGB BLD-MCNC: 10.5 G/DL (ref 12–16)
HGB BLD-MCNC: 9.1 G/DL (ref 12–16)
IMM GRANULOCYTES # BLD AUTO: 0.04 K/UL (ref 0–0.04)
IMM GRANULOCYTES # BLD AUTO: 0.05 K/UL (ref 0–0.04)
IMM GRANULOCYTES NFR BLD AUTO: 0.4 % (ref 0–0.5)
IMM GRANULOCYTES NFR BLD AUTO: 0.4 % (ref 0–0.5)
LYMPHOCYTES # BLD AUTO: 0.9 K/UL (ref 1–4.8)
LYMPHOCYTES # BLD AUTO: 1.3 K/UL (ref 1–4.8)
LYMPHOCYTES NFR BLD: 10.8 % (ref 18–48)
LYMPHOCYTES NFR BLD: 8.4 % (ref 18–48)
MAGNESIUM SERPL-MCNC: 1.5 MG/DL (ref 1.6–2.6)
MAGNESIUM SERPL-MCNC: 1.9 MG/DL (ref 1.6–2.6)
MCH RBC QN AUTO: 27.3 PG (ref 27–31)
MCH RBC QN AUTO: 27.7 PG (ref 27–31)
MCHC RBC AUTO-ENTMCNC: 31.1 G/DL (ref 32–36)
MCHC RBC AUTO-ENTMCNC: 31.3 G/DL (ref 32–36)
MCV RBC AUTO: 88 FL (ref 82–98)
MCV RBC AUTO: 88 FL (ref 82–98)
MONOCYTES # BLD AUTO: 0.7 K/UL (ref 0.3–1)
MONOCYTES # BLD AUTO: 0.9 K/UL (ref 0.3–1)
MONOCYTES NFR BLD: 6.8 % (ref 4–15)
MONOCYTES NFR BLD: 7.7 % (ref 4–15)
NEUTROPHILS # BLD AUTO: 8.7 K/UL (ref 1.8–7.7)
NEUTROPHILS # BLD AUTO: 9.9 K/UL (ref 1.8–7.7)
NEUTROPHILS NFR BLD: 80.9 % (ref 38–73)
NEUTROPHILS NFR BLD: 84.3 % (ref 38–73)
NRBC BLD-RTO: 0 /100 WBC
NRBC BLD-RTO: 0 /100 WBC
PHOSPHATE SERPL-MCNC: 2.9 MG/DL (ref 2.7–4.5)
PHOSPHATE SERPL-MCNC: 3.7 MG/DL (ref 2.7–4.5)
PLATELET # BLD AUTO: 171 K/UL (ref 150–450)
PLATELET # BLD AUTO: 220 K/UL (ref 150–450)
PMV BLD AUTO: 10 FL (ref 9.2–12.9)
PMV BLD AUTO: 9.5 FL (ref 9.2–12.9)
POTASSIUM SERPL-SCNC: 3.2 MMOL/L (ref 3.5–5.1)
POTASSIUM SERPL-SCNC: 3.9 MMOL/L (ref 3.5–5.1)
PROT SERPL-MCNC: 4.2 G/DL (ref 6–8.4)
PROT SERPL-MCNC: 5.5 G/DL (ref 6–8.4)
RBC # BLD AUTO: 3.29 M/UL (ref 4–5.4)
RBC # BLD AUTO: 3.85 M/UL (ref 4–5.4)
SODIUM SERPL-SCNC: 142 MMOL/L (ref 136–145)
SODIUM SERPL-SCNC: 144 MMOL/L (ref 136–145)
WBC # BLD AUTO: 10.26 K/UL (ref 3.9–12.7)
WBC # BLD AUTO: 12.27 K/UL (ref 3.9–12.7)

## 2023-06-06 PROCEDURE — 85025 COMPLETE CBC W/AUTO DIFF WBC: CPT | Mod: 91 | Performed by: OTOLARYNGOLOGY

## 2023-06-06 PROCEDURE — 99233 SBSQ HOSP IP/OBS HIGH 50: CPT | Mod: FS,,, | Performed by: NURSE PRACTITIONER

## 2023-06-06 PROCEDURE — 97530 THERAPEUTIC ACTIVITIES: CPT

## 2023-06-06 PROCEDURE — 85025 COMPLETE CBC W/AUTO DIFF WBC: CPT | Performed by: NURSE PRACTITIONER

## 2023-06-06 PROCEDURE — 25000003 PHARM REV CODE 250: Performed by: NURSE PRACTITIONER

## 2023-06-06 PROCEDURE — 25000003 PHARM REV CODE 250: Performed by: STUDENT IN AN ORGANIZED HEALTH CARE EDUCATION/TRAINING PROGRAM

## 2023-06-06 PROCEDURE — 25000003 PHARM REV CODE 250: Performed by: PSYCHIATRY & NEUROLOGY

## 2023-06-06 PROCEDURE — 11000001 HC ACUTE MED/SURG PRIVATE ROOM

## 2023-06-06 PROCEDURE — 83735 ASSAY OF MAGNESIUM: CPT | Mod: 91 | Performed by: OTOLARYNGOLOGY

## 2023-06-06 PROCEDURE — 84100 ASSAY OF PHOSPHORUS: CPT | Mod: 91 | Performed by: OTOLARYNGOLOGY

## 2023-06-06 PROCEDURE — 99900035 HC TECH TIME PER 15 MIN (STAT)

## 2023-06-06 PROCEDURE — 27000221 HC OXYGEN, UP TO 24 HOURS

## 2023-06-06 PROCEDURE — 80053 COMPREHEN METABOLIC PANEL: CPT | Performed by: NURSE PRACTITIONER

## 2023-06-06 PROCEDURE — 97162 PT EVAL MOD COMPLEX 30 MIN: CPT

## 2023-06-06 PROCEDURE — 25000003 PHARM REV CODE 250: Performed by: PHYSICIAN ASSISTANT

## 2023-06-06 PROCEDURE — 83735 ASSAY OF MAGNESIUM: CPT | Performed by: NURSE PRACTITIONER

## 2023-06-06 PROCEDURE — 94761 N-INVAS EAR/PLS OXIMETRY MLT: CPT

## 2023-06-06 PROCEDURE — 63600175 PHARM REV CODE 636 W HCPCS: Performed by: NURSE PRACTITIONER

## 2023-06-06 PROCEDURE — 80053 COMPREHEN METABOLIC PANEL: CPT | Mod: 91 | Performed by: OTOLARYNGOLOGY

## 2023-06-06 PROCEDURE — 84100 ASSAY OF PHOSPHORUS: CPT | Performed by: NURSE PRACTITIONER

## 2023-06-06 PROCEDURE — 63600175 PHARM REV CODE 636 W HCPCS: Performed by: STUDENT IN AN ORGANIZED HEALTH CARE EDUCATION/TRAINING PROGRAM

## 2023-06-06 PROCEDURE — 63600175 PHARM REV CODE 636 W HCPCS: Performed by: PSYCHIATRY & NEUROLOGY

## 2023-06-06 PROCEDURE — 99233 PR SUBSEQUENT HOSPITAL CARE,LEVL III: ICD-10-PCS | Mod: FS,,, | Performed by: NURSE PRACTITIONER

## 2023-06-06 PROCEDURE — 97165 OT EVAL LOW COMPLEX 30 MIN: CPT

## 2023-06-06 PROCEDURE — 94760 N-INVAS EAR/PLS OXIMETRY 1: CPT

## 2023-06-06 RX ORDER — GABAPENTIN 300 MG/1
300 CAPSULE ORAL 3 TIMES DAILY
Status: DISCONTINUED | OUTPATIENT
Start: 2023-06-06 | End: 2023-06-07 | Stop reason: HOSPADM

## 2023-06-06 RX ORDER — MORPHINE SULFATE 2 MG/ML
2 INJECTION, SOLUTION INTRAMUSCULAR; INTRAVENOUS ONCE
Status: COMPLETED | OUTPATIENT
Start: 2023-06-06 | End: 2023-06-06

## 2023-06-06 RX ORDER — AMOXICILLIN 250 MG
1 CAPSULE ORAL 2 TIMES DAILY
Status: DISCONTINUED | OUTPATIENT
Start: 2023-06-06 | End: 2023-06-07 | Stop reason: HOSPADM

## 2023-06-06 RX ORDER — ENOXAPARIN SODIUM 100 MG/ML
40 INJECTION SUBCUTANEOUS EVERY 24 HOURS
Status: DISCONTINUED | OUTPATIENT
Start: 2023-06-06 | End: 2023-06-07 | Stop reason: HOSPADM

## 2023-06-06 RX ORDER — SODIUM CHLORIDE 9 MG/ML
INJECTION, SOLUTION INTRAVENOUS CONTINUOUS
Status: ACTIVE | OUTPATIENT
Start: 2023-06-06 | End: 2023-06-07

## 2023-06-06 RX ORDER — OXYCODONE HYDROCHLORIDE 10 MG/1
10 TABLET ORAL EVERY 4 HOURS PRN
Status: DISCONTINUED | OUTPATIENT
Start: 2023-06-06 | End: 2023-06-07 | Stop reason: HOSPADM

## 2023-06-06 RX ORDER — TAMSULOSIN HYDROCHLORIDE 0.4 MG/1
0.4 CAPSULE ORAL DAILY
Status: DISCONTINUED | OUTPATIENT
Start: 2023-06-06 | End: 2023-06-07 | Stop reason: HOSPADM

## 2023-06-06 RX ORDER — ACETAMINOPHEN 500 MG
1000 TABLET ORAL 4 TIMES DAILY
Status: DISCONTINUED | OUTPATIENT
Start: 2023-06-06 | End: 2023-06-07 | Stop reason: HOSPADM

## 2023-06-06 RX ADMIN — MORPHINE SULFATE 2 MG: 2 INJECTION, SOLUTION INTRAMUSCULAR; INTRAVENOUS at 01:06

## 2023-06-06 RX ADMIN — ENOXAPARIN SODIUM 40 MG: 40 INJECTION SUBCUTANEOUS at 04:06

## 2023-06-06 RX ADMIN — MORPHINE SULFATE 2 MG: 2 INJECTION, SOLUTION INTRAMUSCULAR; INTRAVENOUS at 02:06

## 2023-06-06 RX ADMIN — GABAPENTIN 300 MG: 300 CAPSULE ORAL at 02:06

## 2023-06-06 RX ADMIN — SENNOSIDES AND DOCUSATE SODIUM 1 TABLET: 50; 8.6 TABLET ORAL at 08:06

## 2023-06-06 RX ADMIN — TAMSULOSIN HYDROCHLORIDE 0.4 MG: 0.4 CAPSULE ORAL at 04:06

## 2023-06-06 RX ADMIN — GABAPENTIN 300 MG: 300 CAPSULE ORAL at 08:06

## 2023-06-06 RX ADMIN — HYDROXYZINE HYDROCHLORIDE 25 MG: 25 TABLET, FILM COATED ORAL at 06:06

## 2023-06-06 RX ADMIN — OXYCODONE HYDROCHLORIDE 5 MG: 5 TABLET ORAL at 04:06

## 2023-06-06 RX ADMIN — SODIUM CHLORIDE: 9 INJECTION, SOLUTION INTRAVENOUS at 01:06

## 2023-06-06 RX ADMIN — MORPHINE SULFATE 2 MG: 2 INJECTION, SOLUTION INTRAMUSCULAR; INTRAVENOUS at 05:06

## 2023-06-06 RX ADMIN — LEVETIRACETAM 500 MG: 500 TABLET, FILM COATED ORAL at 09:06

## 2023-06-06 RX ADMIN — HYDROXYZINE HYDROCHLORIDE 25 MG: 25 TABLET, FILM COATED ORAL at 09:06

## 2023-06-06 RX ADMIN — OXYCODONE HYDROCHLORIDE 10 MG: 10 TABLET ORAL at 11:06

## 2023-06-06 RX ADMIN — LEVETIRACETAM 500 MG: 500 TABLET, FILM COATED ORAL at 08:06

## 2023-06-06 RX ADMIN — SENNOSIDES AND DOCUSATE SODIUM 1 TABLET: 50; 8.6 TABLET ORAL at 09:06

## 2023-06-06 RX ADMIN — GABAPENTIN 300 MG: 300 CAPSULE ORAL at 09:06

## 2023-06-06 RX ADMIN — ACETAMINOPHEN 650 MG: 325 TABLET ORAL at 07:06

## 2023-06-06 RX ADMIN — OXYCODONE HYDROCHLORIDE 5 MG: 5 TABLET ORAL at 07:06

## 2023-06-06 RX ADMIN — SODIUM CHLORIDE: 9 INJECTION, SOLUTION INTRAVENOUS at 09:06

## 2023-06-06 RX ADMIN — ONDANSETRON 8 MG: 2 INJECTION INTRAMUSCULAR; INTRAVENOUS at 07:06

## 2023-06-06 RX ADMIN — ACETAMINOPHEN 650 MG: 325 TABLET ORAL at 02:06

## 2023-06-06 RX ADMIN — Medication 6 MG: at 08:06

## 2023-06-06 RX ADMIN — MORPHINE SULFATE 2 MG: 2 INJECTION, SOLUTION INTRAMUSCULAR; INTRAVENOUS at 09:06

## 2023-06-06 RX ADMIN — ACETAMINOPHEN 1000 MG: 500 TABLET ORAL at 08:06

## 2023-06-06 RX ADMIN — OXYCODONE HYDROCHLORIDE 10 MG: 10 TABLET ORAL at 06:06

## 2023-06-06 NOTE — OP NOTE
DATE: 6/5/23     PREOP DIAGNOSIS:  Left temporal encephalocele  Tegmen defect  CSF rhinorrhea     POSTOP DIAGNOSIS:  Same as above     PROCEDURE PERFORMED:  Left middle fossa craniotomy   Resection of temporal encephalocele  Repair of dural defect  Repar of skull base tegmen defect   Lumbar puncture for opening pressure    PANEL 1 (Neurosurgery):   SURGEON:  Rangel Perez D.O. (Neurosurgery)  ASSISTANT:  Glenn Granda M.D. (Neurosurgery resident)     PANEL 2 (Neurootology):  CO SURGEON:  Gasper Griffith M.D. (Neurootology)  ASSITANT:  Ramesh Begum M.D. (Neurootology)     LEVEL OF INVOLVEMENT OF ATTENDING:  Full     INDICATION:  67 yo F who was found to have a left temporal encephalocele protruding through tegmen defect.  She had mastoid effusion and CSF rhinorrhea.  Patient was taken for repair of right encephalocele and tegmen defect.  Surgery was performed by neurootology and neurosurgery.     Consents were obtained and risks, benefits, and alternatives to surgery were discussed.     PROCEDURE IN DETAIL:  The patient was correctly identified and taken to the operating room where the anesthesia team administered general endotracheal anesthesia.  A lumbar puncture was performed while patient was in the lateral decubitus position to obtain an opening pressure.  The lumbar drain Touhy needle was advanced through the interspinous space at L4-5 until the ligamentum flavum and subsequently the thecal sac were punctured at which point brisk CSF flowed.  The manometer was attached and measured an OP of 15.  The lumbar drain catheter was then removed and the patient was returned to a semi lateral position with a large shoulder bump under the ipsilateral shoulder.  The head was fixed in a Infante head frame and turned into the lateral position.  The patient was given prophylactic IV antibiotics.  Patient's left temporal region was clipped free of hair and a curvilinear incision was planned over the middle fossa.  The area was  prepped and draped in typical sterile fashion.  The incision was infiltrated with local anesthetic and incised with 10 blade scalpel followed by careful dissection down to the temporalis fascia.  A temporalis fascial graft was harvested and set to the side to dry.  The temporalis muscle was then incised and dissected off of the underlying bone and retracted.  The scalp flap was retracted anteriorly and held in place with retractors.  Next, the craniotomy was performed using the M8 drill bit and craniotome.  The bone flap was elevated revealing underlying dura.  Bleeding points were cauterized with bipolar cautery.  The bone was drilled flush with the middle fossa floor.  Epidural dissection was performed using a [place]a instrument to elevate the dura off of the middle fossa floor.  This was done from a posterior to anterior direction so as to protect the greater superficial petrosal nerve.  As the dura was elevated from lateral to medial, landmarks were identified as well as the defect in the tegmen.  The dura was elevated until the temporal encephalocele was identified with brain parenchyma protruding through the dura and through the tegmen defect.  The brain parenchyma was cauterized and truncated. Hemostasis was obtained with bipolar cautery and placement of surgicel and floseal.  The edges of the tegmen defect were then measured and temporalis graft was cut to size.  A temporalis fascial graft was laid over the area of defect(s) followed by placement of a split-thickness bone graft followed by another layer of temporalis fascial graft.  This graft-bone-graft sandwich was then covered with hydroset bone cement and then Adheris dural sealant.  Lastly, the dura was incised along the long axis of temporal lobe and a dura matrix inlay was cut to size and laid within the subdural space over the dura defects.  The durotomy was sutured watertight with 4-0 nurolons.  Adheris was deposited over the are.  The bone was then  replated and secured using titanium plates and screws.  The wound was then closed in layered fashion 1st with sutures in the temporalis muscle followed by interrupted galea sutures.  The skin was then closed with a running nylon suture.  The patient's head was wrapped in a head wrap.       COMPLICATIONS: none     INCISION: Left temporal     WOUND CLASS: clean     FINDINGS: Left temporal encephalocele with large tegmen defect     DRAIN: None     CONDITION: stable     PROGNOSIS: good

## 2023-06-06 NOTE — ASSESSMENT & PLAN NOTE
Received zofran, haldol in recovery  Phenergan x 1 dose  Zofran 8mg q8hrs prn  ADAT  Improving  Continue IVF x 24 hrs

## 2023-06-06 NOTE — ASSESSMENT & PLAN NOTE
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     - Ok to leave mastoid dressing off  - Baci to suture line for 3 days BID  - No barriers to dc from ENT standpoint  - Dispo per nsgy/ncc

## 2023-06-06 NOTE — PT/OT/SLP EVAL
"Occupational Therapy   Evaluation    Name: Tona Zafar  MRN: [id number]  Admitting Diagnosis: Tegmen defect of base of skull  Recent Surgery: Procedure(s) (LRB):  REPAIR, CSF LEAK, CRANIUM, MIDDLE FOSSA APPROACH (Left)  REPAIR, CSF LEAK, CRANIUM, MIDDLE FOSSA APPROACH (Left)  LUMBAR PUNCTURE (N/A) 1 Day Post-Op    Recommendations:     Discharge Recommendations: home health OT  Discharge Equipment Recommendations:  shower chair  Barriers to discharge:  None    Assessment:     Tona Zafar is a 68 y.o. female with a medical diagnosis of Tegmen defect of base of skull.  She presents with performance deficits affecting function: weakness, impaired self care skills, impaired functional mobility, impaired balance, decreased lower extremity function, decreased upper extremity function, decreased coordination, abnormal tone, impaired coordination.      Rehab Prognosis: Good; patient would benefit from acute skilled OT services to address these deficits and reach maximum level of function.       Plan:     Patient to be seen 3 x/week to address the above listed problems via self-care/home management, therapeutic activities, therapeutic exercises, neuromuscular re-education  Plan of Care Expires: 07/04/23  Plan of Care Reviewed with: patient    Subjective     Patient:  "My head, ears and eyes hurt."    Occupational Profile:  Patient resides in [place] with her  in one story home with no steps to enter.  PTA patient independent with ADLs including driving.  Currently owns no DME.  Patient is right handed.  Hobbies: TV, cooking, gardening, caring for chickens.    Pain/Comfort:  Pain Rating 1: 10/10  Location 1: ear  Pain Addressed 1: Pre-medicate for activity, Nurse notified  Pain Rating Post-Intervention 1: 10/10    Patients cultural, spiritual, Sabianism conflicts given the current situation: no    Objective:     Communicated with: Nurse prior to session.  Patient found supine with bed alarm, arterial line, " concepcion catheter, telemetry, peripheral IV, oxygen, pulse ox (continuous), SCD upon OT entry to room.    General Precautions: Standard, aspiration, fall  Orthopedic Precautions: N/A  Braces: N/A  Respiratory Status: Nasal cannula, flow 2 L/min    Occupational Performance:    Bed Mobility:    Patient completed Rolling/Turning to Left with  supervision  Patient completed Rolling/Turning to Right with supervision  Patient completed Supine to Sit with supervision  Patient completed Sit to Supine with supervision    Functional Mobility/Transfers:  Patient completed Sit <> Stand Transfer with contact guard assistance  with  no assistive device   Patient completed Bed <> Chair Transfer using Stand Pivot technique with contact guard assistance with no assistive device    Activities of Daily Living:  Grooming: contact guard assistance while standing  Upper Body Dressing: contact guard assistance for standing balance  Lower Body Dressing: contact guard assistance for standing balance    Cognitive/Visual Perceptual:  Cognitive/Psychosocial Skills:     -       Oriented to: Person, Place, Time, and Situation   -       Follows Commands/attention:Follows one-step commands  -       Communication: clear/fluent    Physical Exam:  Postural examination/scapula alignment:    -       Rounded shoulders  Upper Extremity Range of Motion:     -       Right Upper Extremity: WFL  -       Left Upper Extremity: WFL  Upper Extremity Strength:    -       Right Upper Extremity: WFL  -       Left Upper Extremity: WFL    AMPAC 6 Click ADL:  AMPAC Total Score: 19    Treatment & Education:  Education provided on role of OT.  Education provided on fall prevention during ADLs.  Continued education, patient/ family training recommended.    Patient alert and oriented x 3; able to follow 4/4 one step commands.  Patient attentive and interactive throughout the session. Session was conducted separately from PT session to give more opportunities to mobilize  throughout the day.     Patient left supine with all lines intact, call button in reach, and bed alarm on    GOALS:   Multidisciplinary Problems       Occupational Therapy Goals          Problem: Occupational Therapy    Goal Priority Disciplines Outcome Interventions   Occupational Therapy Goal     OT, PT/OT Ongoing, Progressing    Description: Goals set 6/6 to be addressed for 14 days with expiration date, 6/20:  Patient will increase functional independence with ADLs by performing:    Patient will demonstrate rolling to the right with modified independence.  Not met   Patient will demonstrate rolling to the left with modified independence.   Not met  Patient will demonstrate supine -sit with modified independence.   Not met  Patient will demonstrate stand pivot transfers with modified independence.   Not met  Patient will demonstrate grooming while standing with modified independence.   Not met  Patient will demonstrate upper body dressing with modified independence while seated EOB.   Not met  Patient will demonstrate lower body dressing with modified independence while seated EOB.   Not met  Patient will demonstrate toileting with modified independence.   Not met  Patient will demonstrate bathing while seated EOB with modified independence.   Not met  Patient's family / caregiver will demonstrate independence and safety with assisting patient with self-care skills and functional mobility.     Not met                                 History:     Past Medical History:   Diagnosis Date    Abnormal bone density screening     Colon polyps     Degenerative joint disease 02/05/2007    Diverticulosis     Gastroparesis 2018    History of chicken pox     History of depression 02/05/2007    Ischemic colitis 2020    Dr. Drew    Lumbar disc disease          Past Surgical History:   Procedure Laterality Date    APPENDECTOMY      AUGMENTATION OF BREAST      BREAST SURGERY Bilateral 2005    Implants    CHOLECYSTECTOMY  06/2012     COLONOSCOPY  05/12/2020    Dr. Drew, in procedures; repeat in 6 months to check for healing of ischemic colitis    COLONOSCOPY  05/01/2018    Dr. Drew, in procedures    DENTAL SURGERY      DILATION AND CURETTAGE OF UTERUS      HIP SURGERY Right 2007    LUMBAR PUNCTURE N/A 6/5/2023    Procedure: LUMBAR PUNCTURE;  Surgeon: Rangel Perez DO;  [facility];  Service: Neurosurgery;  Laterality: N/A;    REPAIR, CSF LEAK, CRANIUM, MIDDLE FOSSA APPROACH Left 6/5/2023    Procedure: REPAIR, CSF LEAK, CRANIUM, MIDDLE FOSSA APPROACH;  Surgeon: Gasper Griffith MD;  Location: [facility] OR [address] [place] [address];  Service: ENT;  Laterality: Left;    REPAIR, CSF LEAK, CRANIUM, MIDDLE FOSSA APPROACH Left 6/5/2023    Procedure: REPAIR, CSF LEAK, CRANIUM, MIDDLE FOSSA APPROACH;  Surgeon: Rangel Perez DO;  [facility];  Service: Neurosurgery;  Laterality: Left;    TONSILLECTOMY      UPPER GASTROINTESTINAL ENDOSCOPY  04/24/2018    Dr. Drew, in media    WRIST SURGERY Left 01/2014       Time Tracking:     OT Date of Treatment: 06/06/23  OT Start Time: 0517  OT Stop Time: 0533  OT Total Time (min): 16 min    Billable Minutes:Evaluation 8  Therapeutic Activity 8    6/6/2023

## 2023-06-06 NOTE — PLAN OF CARE
06/06/23 0954   Post-Acute Status   Post-Acute Authorization Home Health   Home Health Status Referrals Sent   Coverage [employer]   Discharge Plan   Discharge Plan A Home with family;Home Health     Therapy recommendation Home Health/OT upon d/c. VENICE faxed HH referral to [facility] Phone: [phone]  - [place] via [employer] for review. VENICE will continue to follow patient.      Cheri Monge LMSW  PRN - [facility]  [id number]

## 2023-06-06 NOTE — PT/OT/SLP EVAL
Physical Therapy  Evaluation and Treatment    Patient Name:  Tona Zafar   MRN:  [id number]    Recent Surgery: Procedure(s) (LRB):  REPAIR, CSF LEAK, CRANIUM, MIDDLE FOSSA APPROACH (Left)  REPAIR, CSF LEAK, CRANIUM, MIDDLE FOSSA APPROACH (Left)  LUMBAR PUNCTURE (N/A) 1 Day Post-Op    Recommendations:     Discharge Recommendations:  home health PT   Discharge Equipment Recommendations: none   Barriers to discharge: None    Highest Level of Mobility: 3 lateral steps along EOB to R  Assistance Required: SBA no AD    Assessment:     Tona Zafar is a 68 y.o. female admitted with a medical diagnosis of Tegmen defect of base of skull. She presents with the following impairments/functional limitations:  weakness, gait instability, impaired endurance, impaired self care skills, impaired functional mobility, impaired balance, pain    Pt met with HOB elevated, spouse present and agreeable to PT session. Pt reports her PLOF is (I) with functional mobility and ADLs using no DME. Pt is largely limited by severe HA and dizziness on exam. Of note, pt received IV morphine just prior to PT eval. Pt currently requires SBA for bed mobility and transfers, ambulation deferred due to severe dizziness.     Pt would benefit from continued skilled acute PT 3x/wk to address above listed functional deficits, provide patient/caregiver education, reduce fall risk, and maximize (I) and safety with functional mobility. After hospital discharge, pt would benefit from HHPT to maximize rehab potential.    Rehab Prognosis: Good; patient would benefit from acute skilled PT services to address these deficits and reach maximum level of function.      Plan:     During this hospitalization, patient to be seen 3 x/week to address the identified rehab impairments via gait training, therapeutic activities, therapeutic exercises and progress toward the following goals:    Plan of Care Expires:  07/06/23    This plan of care has been discussed with the  "patient/caregiver, who was included in its development and is in agreement with the identified goals and treatment plan.     Subjective     Communicated with RN prior to session.  Patient agreeable to participate.     Chief Complaint: Tegmen defect of base of skull  Patient/Family Comments/goals: "I'm really dizzy. I just got that medicine and I'm really feeling it"    Pain/Comfort:  Pain Rating 1: 10/10  Location - Orientation 1: generalized  Location 1: head (headache)  Pain Addressed 1: Pre-medicate for activity, Nurse notified  Pain Rating Post-Intervention 1: 10/10    Patients cultural, spiritual, Latter-day conflicts given the current situation: no    Patient's living environment is as follows:  Living Environment: Pt lives with spouse in [facility] with [address]. Bathroom set-up: tub/shower combo  Prior Level of Function: independent with mobility and ADLs  DME used: none  DME owned (not currently used): none  Upon discharge, patient will have assistance from: Spouse    Objective:     Patient found HOB elevated with bed alarm, arterial line, peripheral IV, telemetry, oxygen, pulse ox (continuous), SCD  upon PT entry to room.    General Precautions: Standard, aspiration, fall   Orthopedic Precautions:N/A   Braces: N/A   BP (!) 112/54   Pulse 68   Temp 99.4 °F (37.4 °C) (Oral)   Resp 20   Ht 5' 10" (1.778 m)   Wt 77.8 kg (171 lb 8.3 oz)   SpO2 95%   Breastfeeding No   BMI 24.61 kg/m²   Oxygen Device: nasal cannula      Exams:    Cognition:  Patient is oriented to Person, Place, Time, Situation  Follows multistep  commands  Insight to deficits/safety awareness: intact    Edema: None present    Postural examination/scapula alignment: Rounded shoulder    Lower Extremity Range of Motion:  Right Lower Extremity: WNL  Left Lower Extremity: WNL    Lower Extremity Strength    Right LE  Left LE    Hip Flexion: 4+/5 Hip Flexion: 4+/5   Knee Extension: 5/5 Knee Extension: 5/5   Knee Flexion: 5/5 Knee Flexion: 5/5   Ankle " Dorsiflexion:  5/5 Ankle Dorsiflexion: 5/5   Ankle Plantarflexion: 5/5 Ankle Plantarflexion: 5/5        Sensation:   Light touch sensation: Intact BLEs    Functional Mobility:    Bed Mobility:  Supine to Sit: Stand-by Assistance on R side of bed  Sit to Supine: Stand-by Assistance  Scooting anteriorly to EOB to plant feet on floor: Stand-by Assistance    Transfers:   Sit to Stand Transfer: Stand-by Assistance  from EOB with no AD              Gait:  Patient received gait training 3 lateral steps to the R along EOB with Stand-by Assistance and  no AD  Gait Assessment: decreased step length, narrow base of support, flexed posture, and decreased mateo  Gait Pattern Observed: Step-to  Comments: All lines remained intact throughout ambulation trial, gait belt utilized. No overt LOB    Balance:  Static Sit:   Supervision at EOB   Normal: Patient able to maintain steady balance without handhold support.  Static Stand:   Stand-By Assist with no AD  Dynamic Stand:  Stand-By Assist with no AD      Therapeutic Activities/Exercises     Patient assisted with functional mobility as noted above  Discussed at length benefits of PT as well as d/c recommendations. Pt agreeable  Patient instructed to reposition in bed at least every 2 hours to reduce the risk of skin breakdown during hospital stay.  Patient educated on the importance of early mobility, OOB to prevent functional decline during hospital stay  Patient was instructed to utilize staff assistance for mobility/transfers.  Patient is appropriate to transfer with sba and RN/PCT assist  Patient educated on PT POC and role of PT in acute care  White board updated to include patient's safest level of mobility with staff assistance, RN also updated    AM-PAC 6 CLICK MOBILITY  Turning over in bed (including adjusting bedclothes, sheets and blankets)?: 4  Sitting down on and standing up from a chair with arms (e.g., wheelchair, bedside commode, etc.): 3  Moving from lying on back  to sitting on the side of the bed?: 4  Moving to and from a bed to a chair (including a wheelchair)?: 3  Need to walk in hospital room?: 3  Climbing 3-5 steps with a railing?: 3  Basic Mobility Total Score: 20      Patient left HOB elevated with all lines intact, call button in reach, bed alarm on, RN notified, and spouse present.      History/Goals:     PAST MEDICAL HISTORY:  Past Medical History:   Diagnosis Date    Abnormal bone density screening     Colon polyps     Degenerative joint disease 02/05/2007    Diverticulosis     Gastroparesis 2018    History of chicken pox     History of depression 02/05/2007    Ischemic colitis 2020    Dr. Drew    Lumbar disc disease        Past Surgical History:   Procedure Laterality Date    APPENDECTOMY      AUGMENTATION OF BREAST      BREAST SURGERY Bilateral 2005    Implants    CHOLECYSTECTOMY  06/2012    COLONOSCOPY  05/12/2020    Dr. Drew, in procedures; repeat in 6 months to check for healing of ischemic colitis    COLONOSCOPY  05/01/2018    Dr. Drew, in procedures    DENTAL SURGERY      DILATION AND CURETTAGE OF UTERUS      HIP SURGERY Right 2007    LUMBAR PUNCTURE N/A 6/5/2023    Procedure: LUMBAR PUNCTURE;  Surgeon: Rangel Perez DO;  Location: [facility] OR [address] [place] [address];  Service: Neurosurgery;  Laterality: N/A;    REPAIR, CSF LEAK, CRANIUM, MIDDLE FOSSA APPROACH Left 6/5/2023    Procedure: REPAIR, CSF LEAK, CRANIUM, MIDDLE FOSSA APPROACH;  Surgeon: Gasper Griffith MD;  Location: [facility] OR [address] [place] [address];  Service: ENT;  Laterality: Left;    REPAIR, CSF LEAK, CRANIUM, MIDDLE FOSSA APPROACH Left 6/5/2023    Procedure: REPAIR, CSF LEAK, CRANIUM, MIDDLE FOSSA APPROACH;  Surgeon: Rangel Perez DO;  Location: [facility] OR [address] [place] [address];  Service: Neurosurgery;  Laterality: Left;    TONSILLECTOMY      UPPER GASTROINTESTINAL ENDOSCOPY  04/24/2018    Dr. Drew, in media    WRIST SURGERY Left 01/2014       GOALS:   Multidisciplinary Problems       Physical Therapy Goals          Problem: Physical Therapy     Goal Priority Disciplines Outcome Goal Variances Interventions   Physical Therapy Goal     PT, PT/OT Ongoing, Progressing     Description: Goals to be met by: 23     Patient will increase functional independence with mobility by performin. Supine to sit with [place]  2. Sit to supine with [place]  3. Sit to stand transfer with Modified [place]  4. Bed to chair transfer with Supervision using LRAD if needed  5. Gait  x 150 feet with Supervision using LRAD if needed.   6. Lower extremity exercise program x15 reps per handout, with assistance as needed                         Time Tracking:     PT Received On: 23  PT Start Time: 949     PT Stop Time: 1011  PT Total Time (min): 22 min     Billable Minutes: Evaluation 10 and Therapeutic Activity 12      Susan Kasper, PT  2023  Pager# [phone]

## 2023-06-06 NOTE — HPI
68 F presents for elective L temporal craniotomy for CSF leak repair from tegmen defect on 6/5/2023.

## 2023-06-06 NOTE — PROGRESS NOTES
[facility]  Otorhinolaryngology-Head & Neck Surgery  Progress Note    Subjective:     Post-Op Info:  Procedure(s) (LRB):  REPAIR, CSF LEAK, CRANIUM, MIDDLE FOSSA APPROACH (Left)  REPAIR, CSF LEAK, CRANIUM, MIDDLE FOSSA APPROACH (Left)  LUMBAR PUNCTURE (N/A)   1 Day Post-Op  Hospital Day: 2     Interval History: No issues overnight. No complaints this morning. Mastoid dressing removed.     Medications:  Continuous Infusions:   sodium chloride 0.9% 75 mL/hr at 06/06/23 0605    nicardipine Stopped (06/05/23 1215)     Scheduled Meds:   famotidine  20 mg Oral BID    levETIRAcetam  500 mg Oral BID     PRN Meds:acetaminophen, hydrOXYzine HCL, magnesium oxide, magnesium oxide, melatonin, morphine, ondansetron, oxyCODONE, potassium bicarbonate, potassium bicarbonate, potassium bicarbonate, potassium, sodium phosphates, potassium, sodium phosphates, potassium, sodium phosphates, prochlorperazine, sodium chloride 0.9%     Review of patient's allergies indicates:  No Known Allergies  Objective:     Vital Signs (24h Range):  Temp:  [98.4 °F (36.9 °C)-99.2 °F (37.3 °C)] 99.2 °F (37.3 °C)  Pulse:  [65-97] 70  Resp:  [16-36] 21  SpO2:  [92 %-99 %] 95 %  BP: ()/(46-67) 102/59  Arterial Line BP: ()/() 95/66       Lines/Drains/Airways       Drain  Duration                  Urethral Catheter 06/05/23 0725 Non-latex 16 Fr. <1 day              Arterial Line  Duration             Arterial Line 06/05/23 0725 Left Radial <1 day              Peripheral Intravenous Line  Duration                  Peripheral IV - Single Lumen 06/05/23 0602 18 G Anterior;Distal;Right Forearm 1 day         Peripheral IV - Single Lumen 06/05/23 1200 22 G Left;Posterior Hand <1 day                     Physical Exam   NAD, conversant  Mastoid dressing dry, removed  Sutures intact  HB I/VI bilaterally  AAOx3    Significant Labs:  Recent Lab Results         06/06/23  0301   06/06/23  0128   06/05/23  1219        Albumin 2.8    2.2         Alkaline Phosphatase 61   45         ALT 12   9         Anion Gap 8   5         AST 21   18         Baso # 0.02   0.01         Basophil % 0.2   0.1         BILIRUBIN TOTAL 0.5  Comment: For infants and newborns, interpretation of results should be based  on gestational age, weight and in agreement with clinical  observations.    Premature Infant recommended reference ranges:  Up to 24 hours.............<8.0 mg/dL  Up to 48 hours............<12.0 mg/dL  3-5 days..................<15.0 mg/dL  6-29 days.................<15.0 mg/dL     0.4  Comment: For infants and newborns, interpretation of results should be based  on gestational age, weight and in agreement with clinical  observations.    Premature Infant recommended reference ranges:  Up to 24 hours.............<8.0 mg/dL  Up to 48 hours............<12.0 mg/dL  3-5 days..................<15.0 mg/dL  6-29 days.................<15.0 mg/dL           BUN 9   7         Calcium 8.1   6.2  Comment: critical result(s) called and verbal readback obtained from   sammy renteria rn by WPT 06/06/2023 02:09           Chloride 111   121         CO2 23   18         Creatinine 0.8   0.6         Differential Method Automated   Automated         eGFR >60.0   >60.0         Eos # 0.0   0.0         Eosinophil % 0.0   0.0         Glucose 118   105         Gran # (ANC) 9.9   8.7         Gran % 80.9   84.3         Hematocrit 33.8   29.1         Hemoglobin 10.5   9.1         Immature Grans (Abs) 0.05  Comment: Mild elevation in immature granulocytes is non specific and   can be seen in a variety of conditions including stress response,   acute inflammation, trauma and pregnancy. Correlation with other   laboratory and clinical findings is essential.     0.04  Comment: Mild elevation in immature granulocytes is non specific and   can be seen in a variety of conditions including stress response,   acute inflammation, trauma and pregnancy. Correlation with other   laboratory and  clinical findings is essential.           Immature Granulocytes 0.4   0.4         Lymph # 1.3   0.9         Lymph % 10.8   8.4         Magnesium 1.9   1.5         MCH 27.3   27.7         MCHC 31.1   31.3         MCV 88   88         Mono # 0.9   0.7         Mono % 7.7   6.8         MPV 10.0   9.5         nRBC 0   0         Phosphorus 3.7   2.9         Platelets 220   171         POCT Glucose     160       Potassium 3.9   3.2         PROTEIN TOTAL 5.5   4.2         RBC 3.85   3.29         RDW 14.5   14.4         Sodium 142   144         WBC 12.27   10.26                 Significant Diagnostics:  Reviewed    Assessment/Plan:     * Tegmen defect of base of skull  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     - Ok to leave mastoid dressing off  - Baci to suture line for 3 days BID  - No barriers to dc from ENT standpoint  - Dispo per nsgy/ncc        Ramesh Begum MD  Otorhinolaryngology-Head & Neck Surgery  [facility]

## 2023-06-06 NOTE — SUBJECTIVE & OBJECTIVE
Past Medical History:   Diagnosis Date    Abnormal bone density screening     Colon polyps     Degenerative joint disease 02/05/2007    Diverticulosis     Gastroparesis 2018    History of chicken pox     History of depression 02/05/2007    Ischemic colitis 2020    Dr. Drew    Lumbar disc disease      Past Surgical History:   Procedure Laterality Date    APPENDECTOMY      AUGMENTATION OF BREAST      BREAST SURGERY Bilateral 2005    Implants    CHOLECYSTECTOMY  06/2012    COLONOSCOPY  05/12/2020    Dr. Drew, in procedures; repeat in 6 months to check for healing of ischemic colitis    COLONOSCOPY  05/01/2018    Dr. Drew, in procedures    DENTAL SURGERY      DILATION AND CURETTAGE OF UTERUS      HIP SURGERY Right 2007    LUMBAR PUNCTURE N/A 6/5/2023    Procedure: LUMBAR PUNCTURE;  Surgeon: Rangel Perez DO;  [facility];  Service: Neurosurgery;  Laterality: N/A;    REPAIR, CSF LEAK, CRANIUM, MIDDLE FOSSA APPROACH Left 6/5/2023    Procedure: REPAIR, CSF LEAK, CRANIUM, MIDDLE FOSSA APPROACH;  Surgeon: Gasper Griffith MD;  [facility];  Service: ENT;  Laterality: Left;    REPAIR, CSF LEAK, CRANIUM, MIDDLE FOSSA APPROACH Left 6/5/2023    Procedure: REPAIR, CSF LEAK, CRANIUM, MIDDLE FOSSA APPROACH;  Surgeon: Rangel Perez DO;  [facility];  Service: Neurosurgery;  Laterality: Left;    TONSILLECTOMY      UPPER GASTROINTESTINAL ENDOSCOPY  04/24/2018    Dr. Drew, in media    WRIST SURGERY Left 01/2014       No current facility-administered medications on file prior to encounter.     Current Outpatient Medications on File Prior to Encounter   Medication Sig Dispense Refill    magnesium 200 mg Tab Take by mouth once daily.      zinc 50 mg Tab Take 1 tablet by mouth once daily.       Allergies: Patient has no known allergies.    Family History   Problem Relation Age of Onset    Diabetes Mother     Throat cancer Father     Colon cancer Maternal Grandfather     Breast cancer Neg Hx      Ovarian cancer Neg Hx     Crohn's disease Neg Hx     Esophageal cancer Neg Hx     Ulcerative colitis Neg Hx     Stomach cancer Neg Hx     Celiac disease Neg Hx        Social History     Tobacco Use    Smoking status: Former     Types: Cigarettes    Smokeless tobacco: Never   Substance Use Topics    Alcohol use: Yes     Alcohol/week: 1.0 standard drink     Types: 1 Glasses of wine per week     Comment: occasional    Drug use: No      Review of Systems: Review of Systems   Constitutional:  Negative for chills and fever.   HENT:  Negative for congestion, ear discharge and ear pain.    Eyes:  Negative for blurred vision, double vision and photophobia.   Respiratory:  Negative for cough, sputum production and shortness of breath.    Cardiovascular:  Negative for chest pain and palpitations.   Gastrointestinal:  Positive for nausea and vomiting. Negative for heartburn.   Genitourinary:  Negative for dysuria, frequency and urgency.   Musculoskeletal:  Negative for back pain, joint pain and neck pain.   Skin:  Negative for itching and rash.   Neurological:  Positive for headaches. Negative for sensory change, speech change, focal weakness, seizures, loss of consciousness and weakness.     Vitals:   Temp: 97.2 °F (36.2 °C)  Pulse: 73  Rhythm: normal sinus rhythm  BP: (!) 115/53  MAP (mmHg): 80  Resp: 18  SpO2: 96 %    Temp  Min: 97.2 °F (36.2 °C)  Max: 99.4 °F (37.4 °C)  Pulse  Min: 65  Max: 92  BP  Min: 91/46  Max: 124/64  MAP (mmHg)  Min: 67  Max: 85  Resp  Min: 16  Max: 48  SpO2  Min: 86 %  Max: 99 %    06/05 0701 - 06/06 0700  In: 4340.4 [P.O.:260; I.V.:1330.2]  Out: 2485 [Urine:2485]         Examination:   Constitutional: Well-nourished and -developed. No apparent distress.   Eyes: Conjunctiva clear, anicteric. Lids no lesions.  Head/Ears/Nose/Mouth/Throat/Neck: Moist mucous membranes. External ears, nose atraumatic.  Cardiovascular: Regular rhythm. No leg edema.  Respiratory: Comfortable respirations. Clear to  auscultation.  Gastrointestinal: Soft, nondistended, nontender. + bowel sounds.    Neurologic:   -E 4 V 5 M 6  -Drowsy. Oriented to person, place, and time. Speech fluent. Follows commands.   -Cranial nerves: EOM intact, PERRL, no facial droop, + cough  -Strength: Moves all extremities spontaneously, antigravity  -Sensation: Intact to light touch.    Today I independently reviewed pertinent medications, lines/drains/airways, imaging, cardiology results, laboratory results, microbiology results, notably:   Pre op labs, post labs and imaging

## 2023-06-06 NOTE — PLAN OF CARE
POC established and functional mobility goals were created to help pt return to PLOF. Will be reassessed as appropriate to measure pt progress.    Problem: Physical Therapy  Goal: Physical Therapy Goal  Description: Goals to be met by: 23     Patient will increase functional independence with mobility by performin. Supine to sit with [place]  2. Sit to supine with [place]  3. Sit to stand transfer with Modified [place]  4. Bed to chair transfer with Supervision using LRAD if needed  5. Gait  x 150 feet with Supervision using LRAD if needed.   6. Lower extremity exercise program x15 reps per handout, with assistance as needed    Outcome: Ongoing, Progressing

## 2023-06-06 NOTE — SUBJECTIVE & OBJECTIVE
Interval History: No issues overnight. No complaints this morning. Mastoid dressing removed.     Medications:  Continuous Infusions:   sodium chloride 0.9% 75 mL/hr at 06/06/23 0605    nicardipine Stopped (06/05/23 1215)     Scheduled Meds:   famotidine  20 mg Oral BID    levETIRAcetam  500 mg Oral BID     PRN Meds:acetaminophen, hydrOXYzine HCL, magnesium oxide, magnesium oxide, melatonin, morphine, ondansetron, oxyCODONE, potassium bicarbonate, potassium bicarbonate, potassium bicarbonate, potassium, sodium phosphates, potassium, sodium phosphates, potassium, sodium phosphates, prochlorperazine, sodium chloride 0.9%     Review of patient's allergies indicates:  No Known Allergies  Objective:     Vital Signs (24h Range):  Temp:  [98.4 °F (36.9 °C)-99.2 °F (37.3 °C)] 99.2 °F (37.3 °C)  Pulse:  [65-97] 70  Resp:  [16-36] 21  SpO2:  [92 %-99 %] 95 %  BP: ()/(46-67) 102/59  Arterial Line BP: ()/() 95/66       Lines/Drains/Airways       Drain  Duration                  Urethral Catheter 06/05/23 0725 Non-latex 16 Fr. <1 day              Arterial Line  Duration             Arterial Line 06/05/23 0725 Left Radial <1 day              Peripheral Intravenous Line  Duration                  Peripheral IV - Single Lumen 06/05/23 0602 18 G Anterior;Distal;Right Forearm 1 day         Peripheral IV - Single Lumen 06/05/23 1200 22 G Left;Posterior Hand <1 day                     Physical Exam   NAD, conversant  Mastoid dressing dry, removed  Sutures intact  HB I/VI bilaterally  AAOx3    Significant Labs:  Recent Lab Results         06/06/23  0301   06/06/23  0128   06/05/23  1219        Albumin 2.8   2.2         Alkaline Phosphatase 61   45         ALT 12   9         Anion Gap 8   5         AST 21   18         Baso # 0.02   0.01         Basophil % 0.2   0.1         BILIRUBIN TOTAL 0.5  Comment: For infants and newborns, interpretation of results should be based  on gestational age, weight and in agreement with  clinical  observations.    Premature Infant recommended reference ranges:  Up to 24 hours.............<8.0 mg/dL  Up to 48 hours............<12.0 mg/dL  3-5 days..................<15.0 mg/dL  6-29 days.................<15.0 mg/dL     0.4  Comment: For infants and newborns, interpretation of results should be based  on gestational age, weight and in agreement with clinical  observations.    Premature Infant recommended reference ranges:  Up to 24 hours.............<8.0 mg/dL  Up to 48 hours............<12.0 mg/dL  3-5 days..................<15.0 mg/dL  6-29 days.................<15.0 mg/dL           BUN 9   7         Calcium 8.1   6.2  Comment: critical result(s) called and verbal readback obtained from   sammy renteria rn by WPT 06/06/2023 02:09           Chloride 111   121         CO2 23   18         Creatinine 0.8   0.6         Differential Method Automated   Automated         eGFR >60.0   >60.0         Eos # 0.0   0.0         Eosinophil % 0.0   0.0         Glucose 118   105         Gran # (ANC) 9.9   8.7         Gran % 80.9   84.3         Hematocrit 33.8   29.1         Hemoglobin 10.5   9.1         Immature Grans (Abs) 0.05  Comment: Mild elevation in immature granulocytes is non specific and   can be seen in a variety of conditions including stress response,   acute inflammation, trauma and pregnancy. Correlation with other   laboratory and clinical findings is essential.     0.04  Comment: Mild elevation in immature granulocytes is non specific and   can be seen in a variety of conditions including stress response,   acute inflammation, trauma and pregnancy. Correlation with other   laboratory and clinical findings is essential.           Immature Granulocytes 0.4   0.4         Lymph # 1.3   0.9         Lymph % 10.8   8.4         Magnesium 1.9   1.5         MCH 27.3   27.7         MCHC 31.1   31.3         MCV 88   88         Mono # 0.9   0.7         Mono % 7.7   6.8         MPV 10.0   9.5         nRBC 0   0          Phosphorus 3.7   2.9         Platelets 220   171         POCT Glucose     160       Potassium 3.9   3.2         PROTEIN TOTAL 5.5   4.2         RBC 3.85   3.29         RDW 14.5   14.4         Sodium 142   144         WBC 12.27   10.26                 Significant Diagnostics:  Reviewed

## 2023-06-06 NOTE — NURSING
[facility] Care Plan    POC reviewed with Tona Zafar and family at 0300. Pt verbalized understanding.     - AAOx4 - spontaneous movement in all extremities. Pt denies tingling and numbness. Persistent HA since prior to operation. Medicated around the clock for HA pain with prn oxycodone and IVP morphine.   - NS infusing at 75 ccs/hr  - Cedillo catheter in place as ordered. Catheter care with chg wipes performed.  - Baldwin to left radial not drawing back and dampened. Read cuff pressure per day shift report from ELISA Mcadams.  - ALDO Jang notified of critical calcium on am labs. Redraw ordered.  - Head dressing reinforced. Faint pink/serous sanguinous drainage to inside of gauze. Controlled and unchanged since beginning of shift.  - Medicated with prn zofran x1 for intermittent nausea.  -  at bedside.    Questions and concerns addressed. No acute events overnight. Pt progressing toward goals. Will continue to monitor. See below and flowsheets for full assessment and VS info.       Is this a stroke patient? no    Neuro:  Mily Coma Scale  Best Eye Response: 4-->(E4) spontaneous  Best Motor Response: 6-->(M6) obeys commands  Best Verbal Response: 5-->(V5) oriented  Mily Coma Scale Score: 15  Assessment Qualifiers: patient not sedated/intubated, no eye obstruction present  Pupil PERRLA: yes     24hr Temp:  [97.9 °F (36.6 °C)-99.2 °F (37.3 °C)]     CV:   Rhythm: normal sinus rhythm  BP goals:   SBP < 140  MAP > 65    Resp:           Plan:  2L nasal cannula since post op per report    GI/:     Diet/Nutrition Received: regular  Last Bowel Movement: 06/04/23  Voiding Characteristics: urethral catheter (bladder)    Intake/Output Summary (Last 24 hours) at 6/6/2023 0342  Last data filed at 6/6/2023 0305  Gross per 24 hour   Intake 4065.15 ml   Output 2285 ml   Net 1780.15 ml          Labs/Accuchecks:  Recent Labs   Lab 06/06/23  0301   WBC 12.27   RBC 3.85*   HGB 10.5*   HCT 33.8*         Recent Labs   Lab  06/06/23  0128      K 3.2*   CO2 18*   *   BUN 7*   CREATININE 0.6   ALKPHOS 45*   ALT 9*   AST 18   BILITOT 0.4    No results for input(s): PROTIME, INR, APTT, HEPANTIXA in the last 168 hours. No results for input(s): CPK, CPKMB, TROPONINI, MB in the last 168 hours.    Electrolytes: N/A - electrolytes WDL  Accuchecks: none    Gtts:   sodium chloride 0.9% 75 mL/hr at 06/06/23 0305    nicardipine Stopped (06/05/23 1215)       LDA/Wounds:  Lines/Drains/Airways       Drain  Duration                  Urethral Catheter 06/05/23 0725 Non-latex 16 Fr. <1 day              Arterial Line  Duration             Arterial Line 06/05/23 0725 Left Radial <1 day              Peripheral Intravenous Line  Duration                  Peripheral IV - Single Lumen 06/05/23 0602 18 G Anterior;Distal;Right Forearm <1 day         Peripheral IV - Single Lumen 06/05/23 1200 22 G Left;Posterior Hand <1 day                  Wounds: No  Wound care consulted: No

## 2023-06-06 NOTE — ASSESSMENT & PLAN NOTE
Tegmen defect of temporal bone, chronic CSF leak/encephalocele, symptoms started in 2018  S/p Left temporal craniotomy and repair of tegmen defect with cement and Intradural dura matrix inlay at site of encephalocele 6/5    -Admit to [facility] for hourly neuro checks  -Vital signs q1hr  -SBP goal < 140  -NSGY, ENT following  -Post op CTH and CT temporal bone with expected post op changes  -Multimodal pain control  -ADAT once passes bedside swallow  -Daily CBC, CMP, Mg, Phos  -Hourly I&Os  -Discontinue Cedillo and arterial line  -Start diet  -Keppra 500mg BID  -Gabapentin added for pain control  -TTF to NSGY team

## 2023-06-06 NOTE — PROGRESS NOTES
[facility] - Neuro Critical Care  Neurosurgery  Progress Note    Subjective:     History of Present Illness: 68 F presents for elective L temporal craniotomy for CSF leak repair from tegmen defect on 6/5/2023      Post-Op Info:  Procedure(s) (LRB):  REPAIR, CSF LEAK, CRANIUM, MIDDLE FOSSA APPROACH (Left)  REPAIR, CSF LEAK, CRANIUM, MIDDLE FOSSA APPROACH (Left)  LUMBAR PUNCTURE (N/A)   1 Day Post-Op     Interval History: 6/6: OR yesterday for L crani for tegmen defect CSF leak repair. Tolerated procedure well, expected left cranial pain. Exam stable compared to preop. OK for TTF today,     Medications:  Continuous Infusions:   sodium chloride 0.9% 75 mL/hr at 06/06/23 0605    nicardipine Stopped (06/05/23 1215)     Scheduled Meds:   enoxparin  40 mg Subcutaneous Q24H (prophylaxis, 1700)    famotidine  20 mg Oral BID    levETIRAcetam  500 mg Oral BID     PRN Meds:acetaminophen, hydrOXYzine HCL, magnesium oxide, magnesium oxide, melatonin, morphine, ondansetron, oxyCODONE, potassium bicarbonate, potassium bicarbonate, potassium bicarbonate, potassium, sodium phosphates, potassium, sodium phosphates, potassium, sodium phosphates, prochlorperazine, sodium chloride 0.9%     Review of Systems  Objective:     Weight: 77.8 kg (171 lb 8.3 oz)  Body mass index is 24.61 kg/m².  Vital Signs (Most Recent):  Temp: 99.4 °F (37.4 °C) (06/06/23 0700)  Pulse: 70 (06/06/23 0605)  Resp: (!) 21 (06/06/23 0605)  BP: (!) 102/59 (06/06/23 0605)  SpO2: 95 % (06/06/23 0605) Vital Signs (24h Range):  Temp:  [98.4 °F (36.9 °C)-99.4 °F (37.4 °C)] 99.4 °F (37.4 °C)  Pulse:  [65-97] 70  Resp:  [16-36] 21  SpO2:  [92 %-99 %] 95 %  BP: ()/(46-67) 102/59  Arterial Line BP: ()/() 95/66                              Urethral Catheter 06/05/23 0725 Non-latex 16 Fr. (Active)   Site Assessment Clean;Dry;Intact 06/06/23 0305   Collection Container Urimeter 06/06/23 0305   Securement Method secured to top of thigh w/ adhesive device  "06/06/23 0305   Catheter Care Performed yes 06/06/23 0305   Reason for Continuing Urinary Catheterization Post operative 06/06/23 0305   CAUTI Prevention Bundle Securement Device in place with 1" slack;Intact seal between catheter & drainage tubing;Drainage bag/urimeter off the floor;Sheeting clip in use;No dependent loops or kinks;Drainage bag/urimeter not overfilled (<2/3 full);Drainage bag/urimeter below bladder 06/05/23 1905   Output (mL) 55 mL 06/06/23 0605          Physical Exam         Neurosurgery Physical Exam    Physical Exam:    Constitutional: No distress.     HEENT: cranial and left ear dressing in place, c/d/i    Cardiovascular: Regular rhythm.     Pulm: aerating well, saturating well    Abdominal: Soft.     Psych/Behavior: He is alert.     E4V5M6  AOx3  PERRL  EOMI  Face Symmetric  Tongue midline  Partial L hearing loss  BUE 5/5  BLE 5/5  No drift      Significant Labs:  Recent Labs   Lab 06/06/23  0128 06/06/23  0301    118*    142   K 3.2* 3.9   * 111*   CO2 18* 23   BUN 7* 9   CREATININE 0.6 0.8   CALCIUM 6.2* 8.1*   MG 1.5* 1.9     Recent Labs   Lab 06/06/23  0128 06/06/23  0301   WBC 10.26 12.27   HGB 9.1* 10.5*   HCT 29.1* 33.8*    220     No results for input(s): LABPT, INR, APTT in the last 48 hours.  Microbiology Results (last 7 days)       ** No results found for the last 168 hours. **          All pertinent labs from the last 24 hours have been reviewed.    Significant Diagnostics:  I have reviewed and interpreted all pertinent imaging results/findings within the past 24 hours.    Assessment/Plan:     * Tegmen defect of base of skull  68 F presents for elective L temporal craniotomy for CSF leak repair from tegmen defect on 6/5/2023    -- ICU post op, TTF today   -- q4 hour neurochecks  -- SBP < 160  -- Keppra 500 BID for 7 days  -- CTH post op with expected changes  -- PT/OT OOB  -- ADAT  -- LVX today     -- Dispo: likely home today/tomorrow pending " PT/OT        Fabiano Basilio MD  Neurosurgery  [facility] - [facility]

## 2023-06-06 NOTE — PLAN OF CARE
Problem: Adult Inpatient Plan of Care  Goal: Readiness for Transition of Care  Outcome: Ongoing, Progressing     Problem: Adult Inpatient Plan of Care  Goal: Optimal Comfort and Wellbeing  Outcome: Ongoing, Progressing    POC reviewed with the patient and they verbalized understanding.  Prn around the clock. All comments and concerns addressed. Bed locked in lowest position with bed alarm set, call light within reach. Safety precautions maintained. VSS, see flowsheets.  Will continue to monitor for changes to POC and clinical condition.

## 2023-06-06 NOTE — PLAN OF CARE
OT evaluation completed.  Problem: Occupational Therapy  Goal: Occupational Therapy Goal  Description: Goals set 6/6 to be addressed for 14 days with expiration date, 6/20:  Patient will increase functional independence with ADLs by performing:    Patient will demonstrate rolling to the right with modified independence.  Not met   Patient will demonstrate rolling to the left with modified independence.   Not met  Patient will demonstrate supine -sit with modified independence.   Not met  Patient will demonstrate stand pivot transfers with modified independence.   Not met  Patient will demonstrate grooming while standing with modified independence.   Not met  Patient will demonstrate upper body dressing with modified independence while seated EOB.   Not met  Patient will demonstrate lower body dressing with modified independence while seated EOB.   Not met  Patient will demonstrate toileting with modified independence.   Not met  Patient will demonstrate bathing while seated EOB with modified independence.   Not met  Patient's family / caregiver will demonstrate independence and safety with assisting patient with self-care skills and functional mobility.     Not met            Outcome: Ongoing, Progressing

## 2023-06-06 NOTE — SUBJECTIVE & OBJECTIVE
"Interval History: 6/6: OR yesterday for L crani for tegmen defect CSF leak repair. Tolerated procedure well, expected left cranial pain. Exam stable compared to preop. OK for TTF today,     Medications:  Continuous Infusions:   sodium chloride 0.9% 75 mL/hr at 06/06/23 0605    nicardipine Stopped (06/05/23 1215)     Scheduled Meds:   enoxparin  40 mg Subcutaneous Q24H (prophylaxis, 1700)    famotidine  20 mg Oral BID    levETIRAcetam  500 mg Oral BID     PRN Meds:acetaminophen, hydrOXYzine HCL, magnesium oxide, magnesium oxide, melatonin, morphine, ondansetron, oxyCODONE, potassium bicarbonate, potassium bicarbonate, potassium bicarbonate, potassium, sodium phosphates, potassium, sodium phosphates, potassium, sodium phosphates, prochlorperazine, sodium chloride 0.9%     Review of Systems  Objective:     Weight: 77.8 kg (171 lb 8.3 oz)  Body mass index is 24.61 kg/m².  Vital Signs (Most Recent):  Temp: 99.4 °F (37.4 °C) (06/06/23 0700)  Pulse: 70 (06/06/23 0605)  Resp: (!) 21 (06/06/23 0605)  BP: (!) 102/59 (06/06/23 0605)  SpO2: 95 % (06/06/23 0605) Vital Signs (24h Range):  Temp:  [98.4 °F (36.9 °C)-99.4 °F (37.4 °C)] 99.4 °F (37.4 °C)  Pulse:  [65-97] 70  Resp:  [16-36] 21  SpO2:  [92 %-99 %] 95 %  BP: ()/(46-67) 102/59  Arterial Line BP: ()/() 95/66                              Urethral Catheter 06/05/23 0725 Non-latex 16 Fr. (Active)   Site Assessment Clean;Dry;Intact 06/06/23 0305   Collection Container Urimeter 06/06/23 0305   Securement Method secured to top of thigh w/ adhesive device 06/06/23 0305   Catheter Care Performed yes 06/06/23 0305   Reason for Continuing Urinary Catheterization Post operative 06/06/23 0305   CAUTI Prevention Bundle Securement Device in place with 1" slack;Intact seal between catheter & drainage tubing;Drainage bag/urimeter off the floor;Sheeting clip in use;No dependent loops or kinks;Drainage bag/urimeter not overfilled (<2/3 full);Drainage bag/urimeter below " bladder 06/05/23 1905   Output (mL) 55 mL 06/06/23 0605          Physical Exam         Neurosurgery Physical Exam    Physical Exam:    Constitutional: No distress.     HEENT: cranial and left ear dressing in place, c/d/i    Cardiovascular: Regular rhythm.     Pulm: aerating well, saturating well    Abdominal: Soft.     Psych/Behavior: He is alert.     E4V5M6  AOx3  PERRL  EOMI  Face Symmetric  Tongue midline  Partial L hearing loss  BUE 5/5  BLE 5/5  No drift      Significant Labs:  Recent Labs   Lab 06/06/23  0128 06/06/23  0301    118*    142   K 3.2* 3.9   * 111*   CO2 18* 23   BUN 7* 9   CREATININE 0.6 0.8   CALCIUM 6.2* 8.1*   MG 1.5* 1.9     Recent Labs   Lab 06/06/23  0128 06/06/23  0301   WBC 10.26 12.27   HGB 9.1* 10.5*   HCT 29.1* 33.8*    220     No results for input(s): LABPT, INR, APTT in the last 48 hours.  Microbiology Results (last 7 days)       ** No results found for the last 168 hours. **          All pertinent labs from the last 24 hours have been reviewed.    Significant Diagnostics:  I have reviewed and interpreted all pertinent imaging results/findings within the past 24 hours.

## 2023-06-06 NOTE — PROGRESS NOTES
[facility] - Neurosurgery ([facility])  Neurocritical Care  Progress Note    Admit Date: 6/5/2023  Service Date: 06/06/2023  Length of Stay: 1    Subjective:     Chief Complaint: Tegmen defect of base of skull    History of Present Illness: Tona Zafar is a 68 y.o. female with chronic left temporal bone CSF leak. She has had left sided ear symptoms including pulsatile tinnitus, ear fullness, decreased hearing, and more recently unilateral rhinorrhea when bending over since at least 2018. She was diagnosed with encephalocele. She underwent Left temporal craniotomy and repair of tegmen defect with cement and intradural dura matrix inlay at site of encephalocele on 6/5. Admitted to [facility] post op for close monitoring.    Hospital Course: 06/06/2023: NAEON. Pain medications adjusted, gabapentin added. Stable to SD to NSGY.    Past Medical History:   Diagnosis Date    Abnormal bone density screening     Colon polyps     Degenerative joint disease 02/05/2007    Diverticulosis     Gastroparesis 2018    History of chicken pox     History of depression 02/05/2007    Ischemic colitis 2020    Dr. Drew    Lumbar disc disease      Past Surgical History:   Procedure Laterality Date    APPENDECTOMY      AUGMENTATION OF BREAST      BREAST SURGERY Bilateral 2005    Implants    CHOLECYSTECTOMY  06/2012    COLONOSCOPY  05/12/2020    Dr. Drew, in procedures; repeat in 6 months to check for healing of ischemic colitis    COLONOSCOPY  05/01/2018    Dr. Drew, in procedures    DENTAL SURGERY      DILATION AND CURETTAGE OF UTERUS      HIP SURGERY Right 2007    LUMBAR PUNCTURE N/A 6/5/2023    Procedure: LUMBAR PUNCTURE;  Surgeon: Rangel Perez DO;  Location: [facility] OR [address] [place] [address];  Service: Neurosurgery;  Laterality: N/A;    REPAIR, CSF LEAK, CRANIUM, MIDDLE FOSSA APPROACH Left 6/5/2023    Procedure: REPAIR, CSF LEAK, CRANIUM, MIDDLE FOSSA APPROACH;  Surgeon: Gasper Griffith MD;  Location: [facility] OR [address] [place] [address];  Service: ENT;   Laterality: Left;    REPAIR, CSF LEAK, CRANIUM, MIDDLE FOSSA APPROACH Left 6/5/2023    Procedure: REPAIR, CSF LEAK, CRANIUM, MIDDLE FOSSA APPROACH;  Surgeon: Rangel Perez DO;  Location: [facility] OR [address] [place] [address];  Service: Neurosurgery;  Laterality: Left;    TONSILLECTOMY      UPPER GASTROINTESTINAL ENDOSCOPY  04/24/2018    Dr. Drew, in media    WRIST SURGERY Left 01/2014       No current facility-administered medications on file prior to encounter.     Current Outpatient Medications on File Prior to Encounter   Medication Sig Dispense Refill    magnesium 200 mg Tab Take by mouth once daily.      zinc 50 mg Tab Take 1 tablet by mouth once daily.       Allergies: Patient has no known allergies.    Family History   Problem Relation Age of Onset    Diabetes Mother     Throat cancer Father     Colon cancer Maternal Grandfather     Breast cancer Neg Hx     Ovarian cancer Neg Hx     Crohn's disease Neg Hx     Esophageal cancer Neg Hx     Ulcerative colitis Neg Hx     Stomach cancer Neg Hx     Celiac disease Neg Hx        Social History     Tobacco Use    Smoking status: Former     Types: Cigarettes    Smokeless tobacco: Never   Substance Use Topics    Alcohol use: Yes     Alcohol/week: 1.0 standard drink     Types: 1 Glasses of wine per week     Comment: occasional    Drug use: No      Review of Systems: Review of Systems   Constitutional:  Negative for chills and fever.   HENT:  Negative for congestion, ear discharge and ear pain.    Eyes:  Negative for blurred vision, double vision and photophobia.   Respiratory:  Negative for cough, sputum production and shortness of breath.    Cardiovascular:  Negative for chest pain and palpitations.   Gastrointestinal:  Positive for nausea and vomiting. Negative for heartburn.   Genitourinary:  Negative for dysuria, frequency and urgency.   Musculoskeletal:  Negative for back pain, joint pain and neck pain.   Skin:  Negative for itching and rash.   Neurological:   Positive for headaches. Negative for sensory change, speech change, focal weakness, seizures, loss of consciousness and weakness.     Vitals:   Temp: 97.2 °F (36.2 °C)  Pulse: 73  Rhythm: normal sinus rhythm  BP: (!) 115/53  MAP (mmHg): 80  Resp: 18  SpO2: 96 %    Temp  Min: 97.2 °F (36.2 °C)  Max: 99.4 °F (37.4 °C)  Pulse  Min: 65  Max: 92  BP  Min: 91/46  Max: 124/64  MAP (mmHg)  Min: 67  Max: 85  Resp  Min: 16  Max: 48  SpO2  Min: 86 %  Max: 99 %    06/05 0701 - 06/06 0700  In: 4340.4 [P.O.:260; I.V.:1330.2]  Out: 2485 [Urine:2485]         Examination:   Constitutional: Well-nourished and -developed. No apparent distress.   Eyes: Conjunctiva clear, anicteric. Lids no lesions.  Head/Ears/Nose/Mouth/Throat/Neck: Moist mucous membranes. External ears, nose atraumatic.  Cardiovascular: Regular rhythm. No leg edema.  Respiratory: Comfortable respirations. Clear to auscultation.  Gastrointestinal: Soft, nondistended, nontender. + bowel sounds.    Neurologic:   -E 4 V 5 M 6  -Drowsy. Oriented to person, place, and time. Speech fluent. Follows commands.   -Cranial nerves: EOM intact, PERRL, no facial droop, + cough  -Strength: Moves all extremities spontaneously, antigravity  -Sensation: Intact to light touch.    Today I independently reviewed pertinent medications, lines/drains/airways, imaging, cardiology results, laboratory results, microbiology results, notably:   Pre op labs, post labs and imaging    Assessment/Plan:     Neuro  Temporal encephalocele  See Tegmen defect     ENT  * Tegmen defect of base of skull  Tegmen defect of temporal bone, chronic CSF leak/encephalocele, symptoms started in 2018  S/p Left temporal craniotomy and repair of tegmen defect with cement and Intradural dura matrix inlay at site of encephalocele 6/5    -Admit to [facility] for hourly neuro checks  -Vital signs q1hr  -SBP goal < 140  -NSGY, ENT following  -Post op CTH and CT temporal bone with expected post op changes  -Multimodal pain  control  -ADAT once passes bedside swallow  -Daily CBC, CMP, Mg, Phos  -Hourly I&Os  -Discontinue Cedillo and arterial line  -Start diet  -Keppra 500mg BID  -Gabapentin added for pain control  -TTF to NSGY team    GI  Post-operative nausea and vomiting  Received zofran, haldol in recovery  Phenergan x 1 dose  Zofran 8mg q8hrs prn  ADAT  Improving  Continue IVF x 24 hrs    The patient is being Prophylaxed for:  Venous Thromboembolism with: Mechanical or Chemical  Stress Ulcer with: Not Applicable   Ventilator Pneumonia with: not applicable    Activity Orders          Progressive Mobility Protocol (mobilize patient to their highest level of functioning at least twice daily) starting at 06/05 2000    Turn patient starting at 06/05 1400    Elevate HOB 30 starting at 06/05 1147    Diet Adult Regular (IDDSI Level 7): Regular starting at 06/05 1115        Full Code     Level III    Vanita Sanchez NP  Neurocritical Care  [facility] - Neurosurgery ([facility])

## 2023-06-06 NOTE — NURSING TRANSFER
Nursing Transfer Note      6/6/2023     Reason patient is being transferred: stepdown orders    Transfer To: 925    Transfer via wheelchair    Transfer with cardiac monitoring    Transported by this RN    Telemetry: Box Number [id number]    Medicines sent: na    Any special needs or follow-up needed: na    Chart send with patient: Yes    Notified: spouse at bedside    Upon arrival to floor: patient oriented to room, call bell in reach, and bed in lowest position

## 2023-06-06 NOTE — ASSESSMENT & PLAN NOTE
68 F presents for elective L temporal craniotomy for CSF leak repair from tegmen defect on 6/5/2023    -- ICU post op, TTF today   -- q4 hour neurochecks  -- SBP < 160  -- Keppra 500 BID for 7 days  -- CTH post op with expected changes  -- PT/OT OOB  -- ADAT  -- LVX today     -- Dispo: likely home today/tomorrow pending PT/OT

## 2023-06-07 VITALS
WEIGHT: 171.5 LBS | RESPIRATION RATE: 16 BRPM | BODY MASS INDEX: 24.55 KG/M2 | SYSTOLIC BLOOD PRESSURE: 101 MMHG | HEART RATE: 67 BPM | TEMPERATURE: 98 F | OXYGEN SATURATION: 93 % | DIASTOLIC BLOOD PRESSURE: 46 MMHG | HEIGHT: 70 IN

## 2023-06-07 LAB
ALBUMIN SERPL BCP-MCNC: 2.5 G/DL (ref 3.5–5.2)
ALP SERPL-CCNC: 55 U/L (ref 55–135)
ALT SERPL W/O P-5'-P-CCNC: 10 U/L (ref 10–44)
ANION GAP SERPL CALC-SCNC: 7 MMOL/L (ref 8–16)
AST SERPL-CCNC: 21 U/L (ref 10–40)
BASOPHILS # BLD AUTO: 0.03 K/UL (ref 0–0.2)
BASOPHILS NFR BLD: 0.4 % (ref 0–1.9)
BILIRUB SERPL-MCNC: 0.5 MG/DL (ref 0.1–1)
BUN SERPL-MCNC: 9 MG/DL (ref 8–23)
CALCIUM SERPL-MCNC: 8.3 MG/DL (ref 8.7–10.5)
CHLORIDE SERPL-SCNC: 111 MMOL/L (ref 95–110)
CO2 SERPL-SCNC: 22 MMOL/L (ref 23–29)
CREAT SERPL-MCNC: 0.7 MG/DL (ref 0.5–1.4)
DIFFERENTIAL METHOD: ABNORMAL
EOSINOPHIL # BLD AUTO: 0.1 K/UL (ref 0–0.5)
EOSINOPHIL NFR BLD: 1.3 % (ref 0–8)
ERYTHROCYTE [DISTWIDTH] IN BLOOD BY AUTOMATED COUNT: 14.6 % (ref 11.5–14.5)
EST. GFR  (NO RACE VARIABLE): >60 ML/MIN/1.73 M^2
GLUCOSE SERPL-MCNC: 93 MG/DL (ref 70–110)
HCT VFR BLD AUTO: 30.4 % (ref 37–48.5)
HGB BLD-MCNC: 9 G/DL (ref 12–16)
IMM GRANULOCYTES # BLD AUTO: 0.03 K/UL (ref 0–0.04)
IMM GRANULOCYTES NFR BLD AUTO: 0.4 % (ref 0–0.5)
LYMPHOCYTES # BLD AUTO: 1.3 K/UL (ref 1–4.8)
LYMPHOCYTES NFR BLD: 16.7 % (ref 18–48)
MAGNESIUM SERPL-MCNC: 2 MG/DL (ref 1.6–2.6)
MCH RBC QN AUTO: 26.8 PG (ref 27–31)
MCHC RBC AUTO-ENTMCNC: 29.6 G/DL (ref 32–36)
MCV RBC AUTO: 91 FL (ref 82–98)
MONOCYTES # BLD AUTO: 0.6 K/UL (ref 0.3–1)
MONOCYTES NFR BLD: 7.6 % (ref 4–15)
NEUTROPHILS # BLD AUTO: 5.8 K/UL (ref 1.8–7.7)
NEUTROPHILS NFR BLD: 73.6 % (ref 38–73)
NRBC BLD-RTO: 0 /100 WBC
PHOSPHATE SERPL-MCNC: 1.9 MG/DL (ref 2.7–4.5)
PLATELET # BLD AUTO: 161 K/UL (ref 150–450)
PMV BLD AUTO: 10.3 FL (ref 9.2–12.9)
POTASSIUM SERPL-SCNC: 3.8 MMOL/L (ref 3.5–5.1)
PROT SERPL-MCNC: 5.2 G/DL (ref 6–8.4)
RBC # BLD AUTO: 3.36 M/UL (ref 4–5.4)
SODIUM SERPL-SCNC: 140 MMOL/L (ref 136–145)
WBC # BLD AUTO: 7.89 K/UL (ref 3.9–12.7)

## 2023-06-07 PROCEDURE — 25000003 PHARM REV CODE 250: Performed by: PHYSICIAN ASSISTANT

## 2023-06-07 PROCEDURE — 97535 SELF CARE MNGMENT TRAINING: CPT

## 2023-06-07 PROCEDURE — 80053 COMPREHEN METABOLIC PANEL: CPT | Performed by: STUDENT IN AN ORGANIZED HEALTH CARE EDUCATION/TRAINING PROGRAM

## 2023-06-07 PROCEDURE — 25000003 PHARM REV CODE 250: Performed by: STUDENT IN AN ORGANIZED HEALTH CARE EDUCATION/TRAINING PROGRAM

## 2023-06-07 PROCEDURE — 51798 US URINE CAPACITY MEASURE: CPT

## 2023-06-07 PROCEDURE — 25000003 PHARM REV CODE 250: Performed by: NURSE PRACTITIONER

## 2023-06-07 PROCEDURE — 99024 POSTOP FOLLOW-UP VISIT: CPT | Mod: ,,,

## 2023-06-07 PROCEDURE — 99024 PR POST-OP FOLLOW-UP VISIT: ICD-10-PCS | Mod: ,,,

## 2023-06-07 PROCEDURE — 83735 ASSAY OF MAGNESIUM: CPT | Performed by: STUDENT IN AN ORGANIZED HEALTH CARE EDUCATION/TRAINING PROGRAM

## 2023-06-07 PROCEDURE — 36415 COLL VENOUS BLD VENIPUNCTURE: CPT | Performed by: STUDENT IN AN ORGANIZED HEALTH CARE EDUCATION/TRAINING PROGRAM

## 2023-06-07 PROCEDURE — 85025 COMPLETE CBC W/AUTO DIFF WBC: CPT | Performed by: STUDENT IN AN ORGANIZED HEALTH CARE EDUCATION/TRAINING PROGRAM

## 2023-06-07 PROCEDURE — 84100 ASSAY OF PHOSPHORUS: CPT | Performed by: STUDENT IN AN ORGANIZED HEALTH CARE EDUCATION/TRAINING PROGRAM

## 2023-06-07 PROCEDURE — 25000003 PHARM REV CODE 250: Performed by: PSYCHIATRY & NEUROLOGY

## 2023-06-07 RX ORDER — OXYCODONE AND ACETAMINOPHEN 10; 325 MG/1; MG/1
1 TABLET ORAL EVERY 6 HOURS PRN
Qty: 40 TABLET | Refills: 0 | Status: SHIPPED | OUTPATIENT
Start: 2023-06-07 | End: 2023-08-01 | Stop reason: ALTCHOICE

## 2023-06-07 RX ORDER — TAMSULOSIN HYDROCHLORIDE 0.4 MG/1
0.4 CAPSULE ORAL DAILY
Qty: 30 CAPSULE | Refills: 11 | Status: SHIPPED | OUTPATIENT
Start: 2023-06-08 | End: 2023-08-01 | Stop reason: ALTCHOICE

## 2023-06-07 RX ORDER — LEVETIRACETAM 500 MG/1
500 TABLET ORAL 2 TIMES DAILY
Qty: 10 TABLET | Refills: 0 | Status: ON HOLD | OUTPATIENT
Start: 2023-06-07 | End: 2023-06-15 | Stop reason: HOSPADM

## 2023-06-07 RX ORDER — GABAPENTIN 300 MG/1
300 CAPSULE ORAL 3 TIMES DAILY
Qty: 90 CAPSULE | Refills: 11 | Status: ON HOLD | OUTPATIENT
Start: 2023-06-07 | End: 2023-06-15 | Stop reason: HOSPADM

## 2023-06-07 RX ADMIN — SODIUM CHLORIDE: 9 INJECTION, SOLUTION INTRAVENOUS at 12:06

## 2023-06-07 RX ADMIN — SENNOSIDES AND DOCUSATE SODIUM 1 TABLET: 50; 8.6 TABLET ORAL at 07:06

## 2023-06-07 RX ADMIN — TAMSULOSIN HYDROCHLORIDE 0.4 MG: 0.4 CAPSULE ORAL at 07:06

## 2023-06-07 RX ADMIN — HYDROXYZINE HYDROCHLORIDE 25 MG: 25 TABLET, FILM COATED ORAL at 03:06

## 2023-06-07 RX ADMIN — GABAPENTIN 300 MG: 300 CAPSULE ORAL at 07:06

## 2023-06-07 RX ADMIN — OXYCODONE HYDROCHLORIDE 10 MG: 10 TABLET ORAL at 03:06

## 2023-06-07 RX ADMIN — LEVETIRACETAM 500 MG: 500 TABLET, FILM COATED ORAL at 07:06

## 2023-06-07 RX ADMIN — OXYCODONE HYDROCHLORIDE 10 MG: 10 TABLET ORAL at 07:06

## 2023-06-07 RX ADMIN — GABAPENTIN 300 MG: 300 CAPSULE ORAL at 01:06

## 2023-06-07 RX ADMIN — ACETAMINOPHEN 1000 MG: 500 TABLET ORAL at 07:06

## 2023-06-07 RX ADMIN — OXYCODONE HYDROCHLORIDE 10 MG: 10 TABLET ORAL at 01:06

## 2023-06-07 RX ADMIN — ACETAMINOPHEN 1000 MG: 500 TABLET ORAL at 01:06

## 2023-06-07 NOTE — PLAN OF CARE
Goals remain appropriate.  Problem: Occupational Therapy  Goal: Occupational Therapy Goal  Description: Goals set 6/6 to be addressed for 14 days with expiration date, 6/20:  Patient will increase functional independence with ADLs by performing:    Patient will demonstrate rolling to the right with modified independence.  Not met   Patient will demonstrate rolling to the left with modified independence.   Not met  Patient will demonstrate supine -sit with modified independence.   Not met  Patient will demonstrate stand pivot transfers with modified independence.   Not met  Patient will demonstrate grooming while standing with modified independence.   Not met  Patient will demonstrate upper body dressing with modified independence while seated EOB.   Not met  Patient will demonstrate lower body dressing with modified independence while seated EOB.   Not met  Patient will demonstrate toileting with modified independence.   Not met  Patient will demonstrate bathing while seated EOB with modified independence.   Not met  Patient's family / caregiver will demonstrate independence and safety with assisting patient with self-care skills and functional mobility.     Not met            Outcome: Ongoing, Progressing

## 2023-06-07 NOTE — DISCHARGE INSTRUCTIONS
Neurosurgery Patient Information    -No driving while taking pain medication.  -Take medications as prescribed at discharge.  -Do not take any OTC products containing acetaminophen at the same time as you take your narcotic pain medication. Medications that may contain acetaminophen include but are not limited to: Excedrin and other headache medications, arthritis medications, cold and sinus medications, etc. Please review the list of active ingredients in any OTC medication prior to taking it.  -Do not take any Aspirin or Aspirin containing products for 2 weeks after surgery.  -Do not take any Aleeve, Naprosyn, Naproxen, Ibuprofen, Advil or any other NSAID for 2 weeks after surgery.  -Do not take any herbal supplements for 2 weeks after surgery.   -Do not consume any alcoholic beverages until released by your Neurosurgeon  -Do not perform any excessive bending over or leaning forward as this is a fall hazard.  -Do not perform any heavy lifting or lifting more than 10 lbs from the ground level as this is a fall hazard.    Contact the Neurosurgery Office immediately if:  -If you begin to notice any neurologic changes such as:           -Sudden onset of lethargy or sleepiness           -Sudden confusion, trouble speaking, or understanding            -Sudden trouble seeing in one or both eyes            -Sudden trouble walking, dizziness, loss of coordination            -Sudden severe headache with no known cause            -Sudden onset of numbness or weakness     Wound Care:  Keep your incision open to air. You may shower on the 2nd day after your surgery. Keep the incision clean and dry at all times. Please cover the incision while showering and REMOVE once you have completed taking your shower. Do not allow the force of water to hit the incision. If the incision gets damp, pat it dry. Do not rub or scrub the incision. You cannot take a bath/swim/submerge the incision until 8 weeks after surgery.    Apply Bacitracin  ointment (you can get this over the counter) to incision twice daily.    Call your doctor or go to the Emergency Room for any signs of infection including: increased redness, drainage, pain or fever (temperature greater than or equal to 101.4).       Miscellaneous:  -Follow up with Dr. Griffith on 6/13/23.  -Follow up with Neurosurgery will be arranged. We will contact you with a date and time.  -You have a catheter placed for troubles urinating while in the hospital. Follow up with Urology in 1 week on 6/14/23 so that they can test your urination and remove the concepcion. If you have not heard from the urology clinic in the next few days, please give them a call.  -A home health agency will be contacting you to set up physical therapy from your home a few times a week.  -Follow up with your primary care provider in 1-2 weeks for hospital follow up. This is your responsibility to schedule.       Neurosurgery Office: [phone]

## 2023-06-07 NOTE — ASSESSMENT & PLAN NOTE
68 F presents for elective L temporal craniotomy for CSF leak repair from tegmen defect on 6/5/2023    -- Stepped down to floor under NSGY service.  -- q4 hour neurochecks.  -- SBP < 160.  -- Keppra 500 BID for 7 days.  -- CTH post op with expected changes.  -- PT/OT/OOB.  -- ADAT.  -- LVX for DVT ppx.  -- IS hourly.  -- Bowel regimen.  -- Urinary: Patient unable to void after concepcion removed. Flomax started yesterday. Straight cathed x 2. Will place concepcion and keep for 1 week. Patient will f/u with urology at that time for management.  -- Dispo: likely home today pending concepcion placement.    D/w Dr. Perez.

## 2023-06-07 NOTE — PLAN OF CARE
Problem: Adult Inpatient Plan of Care  Goal: Plan of Care Review  Outcome: Ongoing, Progressing  Goal: Absence of Hospital-Acquired Illness or Injury  Outcome: Ongoing, Progressing  Intervention: Prevent Infection  Flowsheets (Taken 6/7/2023 0232)  Infection Prevention: hand hygiene promoted     Problem: Infection  Goal: Absence of Infection Signs and Symptoms  Outcome: Ongoing, Progressing  Intervention: Prevent or Manage Infection  Flowsheets (Taken 6/7/2023 0232)  Infection Management: aseptic technique maintained

## 2023-06-07 NOTE — PROGRESS NOTES
[facility] - Neurosurgery ([facility])  Neurosurgery  Progress Note    Subjective:     History of Present Illness: 68 F presents for elective L temporal craniotomy for CSF leak repair from tegmen defect on 6/5/2023.      Post-Op Info:  Procedure(s) (LRB):  REPAIR, CSF LEAK, CRANIUM, MIDDLE FOSSA APPROACH (Left)  REPAIR, CSF LEAK, CRANIUM, MIDDLE FOSSA APPROACH (Left)  LUMBAR PUNCTURE (N/A)   2 Days Post-Op     Interval History:  Patient stepped down from [facility]. NAEON. AFVSS. No complaints today besides trouble sleeping. Pain controlled. Neuro stable. Patient unable to void after concepcion removal. Bladder scan today with 700 mL, straight cathed for second time since concepcion removed. Will replace concepcion and keep for 1 week. Patient will f/u with urology at that time. Patient is medically stable for discharge to home with home health. Incision care and activity recommendations reviewed. Plan of care discussed with patient and she voiced understanding. All her questions were answered. Follow-up in Neurosurgery clinic arranged.     Medications:  Continuous Infusions:  Scheduled Meds:   acetaminophen  1,000 mg Oral QID    enoxparin  40 mg Subcutaneous Q24H (prophylaxis, 1700)    gabapentin  300 mg Oral TID    levETIRAcetam  500 mg Oral BID    senna-docusate 8.6-50 mg  1 tablet Oral BID    tamsulosin  0.4 mg Oral Daily     PRN Meds:acetaminophen, hydrOXYzine HCL, magnesium oxide, magnesium oxide, melatonin, morphine, ondansetron, oxyCODONE, potassium bicarbonate, potassium bicarbonate, potassium bicarbonate, potassium, sodium phosphates, potassium, sodium phosphates, potassium, sodium phosphates, prochlorperazine, sodium chloride 0.9%     Review of Systems  Objective:     Weight: 77.8 kg (171 lb 8.3 oz)  Body mass index is 24.61 kg/m².  Vital Signs (Most Recent):  Temp: 98.1 °F (36.7 °C) (06/07/23 0844)  Pulse: 80 (06/07/23 0844)  Resp: 16 (06/07/23 0844)  BP: 120/71 (06/07/23 0844)  SpO2: 95 % (06/07/23 0844) Vital Signs (24h  Range):  Temp:  [96.9 °F (36.1 °C)-98.8 °F (37.1 °C)] 98.1 °F (36.7 °C)  Pulse:  [56-93] 80  Resp:  [10-26] 16  SpO2:  [94 %-97 %] 95 %  BP: (101-124)/(53-73) 120/71     Date 06/07/23 0700 - 06/08/23 0659   Shift 8490-1631 7542-1410 7287-3251 24 Hour Total   INTAKE   Shift Total(mL/kg)       OUTPUT   Urine(mL/kg/hr) 700   700   Shift Total(mL/kg) 700(9)   700(9)   Weight (kg) 77.8 77.8 77.8 77.8                       Female External Urinary Catheter 06/06/23 0800 (Active)   Skin no redness;no breakdown 06/06/23 2000   Tolerance no signs/symptoms of discomfort 06/06/23 2000   Suction Continuous suction at 70 mmHg 06/06/23 2000   Date of last wick change 06/06/23 06/06/23 0800   Time of last wick change 0800 06/06/23 0800   Output (mL) 0 mL 06/06/23 1100          Neurosurgery Physical Exam  Constitutional: Well nourished. No distress.   HEENT: L cranial incision c/d/I with staples and suture. No dehiscence or drainage. Some swelling and bruising to periorbital area.  Cardiovascular: Regular rate and rhythm.   Pulm: Aerating well, saturating well.  Abdominal: Soft, NT/ND.  Psych/Behavior: She is alert.   Neuro:  AOx4  PERRL  EOMI  Face Symmetric  Tongue midline  Partial L hearing loss  BUE 5/5  BLE 5/5  No drift    Significant Labs:  Recent Labs   Lab 06/06/23  0128 06/06/23  0301 06/07/23  0300    118* 93    142 140   K 3.2* 3.9 3.8   * 111* 111*   CO2 18* 23 22*   BUN 7* 9 9   CREATININE 0.6 0.8 0.7   CALCIUM 6.2* 8.1* 8.3*   MG 1.5* 1.9 2.0     Recent Labs   Lab 06/06/23  0128 06/06/23  0301 06/07/23  0300   WBC 10.26 12.27 7.89   HGB 9.1* 10.5* 9.0*   HCT 29.1* 33.8* 30.4*    220 161     No results for input(s): LABPT, INR, APTT in the last 48 hours.  Microbiology Results (last 7 days)       ** No results found for the last 168 hours. **          All pertinent labs from the last 24 hours have been reviewed.    Significant Diagnostics:  I have reviewed and interpreted all pertinent imaging  results/findings within the past 24 hours.    Assessment/Plan:     * Tegmen defect of base of skull  68 F presents for elective L temporal craniotomy for CSF leak repair from tegmen defect on 6/5/2023    -- Stepped down to floor under NSGY service.  -- q4 hour neurochecks.  -- SBP < 160.  -- Keppra 500 BID for 7 days.  -- CTH post op with expected changes.  -- PT/OT/OOB.  -- ADAT.  -- LVX for DVT ppx.  -- IS hourly.  -- Bowel regimen.  -- Urinary: Patient unable to void after concepcion removed. Flomax started yesterday. Straight cathed x 2. Will place concepcion and keep for 1 week. Patient will f/u with urology at that time for management.  -- Dispo: likely home today pending concepcion placement.    D/w Dr. Perez.        Marcie Segovia, SUDHA  Neurosurgery  [facility] - Neurosurgery ([facility])

## 2023-06-07 NOTE — HOSPITAL COURSE
6/7: Patient stepped down from [facility]. MARGA. KATHERINE. No complaints today besides trouble sleeping. Pain controlled. Neuro stable. Patient unable to void after concepcion removal. Bladder scan today with 700 mL, straight cathed for second time since concepcion removed. Will replace concepcion and keep for 1 week. Patient will f/u with urology at that time. Patient is medically stable for discharge to home with home health. Incision care and activity recommendations reviewed. Plan of care discussed with patient and she voiced understanding. All her questions were answered. Follow-up in Neurosurgery clinic arranged.

## 2023-06-07 NOTE — PT/OT/SLP PROGRESS
"Occupational Therapy   Treatment    Name: Tona Zafar  MRN: [id number]  Admitting Diagnosis:  Tegmen defect of base of skull  2 Days Post-Op    Recommendations:     Discharge Recommendations: home health OT  Discharge Equipment Recommendations:  none  Barriers to discharge:  None    Assessment:     Tona Zafar is a 68 y.o. female with a medical diagnosis of Tegmen defect of base of skull.  She presents with performance deficits affecting function are weakness, impaired self care skills, impaired functional mobility, impaired endurance.     Rehab Prognosis:  Good; patient would benefit from acute skilled OT services to address these deficits and reach maximum level of function.       Plan:     Patient to be seen 3 x/week to address the above listed problems via neuromuscular re-education, therapeutic exercises, therapeutic activities, self-care/home management  Plan of Care Expires: 07/04/23  Plan of Care Reviewed with: patient    Subjective     Patient: "The left side of my face; ears, head and eye still hurts."  Pain/Comfort:  Pain Rating 1: 10/10  Location 1:  (left side of face: ear, head, eye)  Pain Addressed 1: Reposition  Pain Rating Post-Intervention 1: 10/10    Objective:     Communicated with: Nurse prior to session.  Patient found supine with bed alarm, telemetry, pulse ox (continuous), peripheral IV, PureWick upon OT entry to room.    General Precautions: Standard, aphasia, fall    Orthopedic Precautions:N/A  Braces: N/A  Respiratory Status: Room air     Occupational Performance:     Bed Mobility:    Patient completed Rolling/Turning to Left with  supervision  Patient completed Rolling/Turning to Right with supervision  Patient completed Supine to Sit with supervision  Patient completed Sit to Supine with supervision     Functional Mobility/Transfers:  Patient completed Sit <> Stand Transfer with stand by assistance  with  no assistive device   Patient completed Bed <> Chair Transfer using Stand " Pivot technique with stand by assistance with no assistive device    Activities of Daily Living:  Grooming: stand by assistance    Upper Body Dressing: stand by assistance    Lower Body Dressing: stand by assistance      [facility] 6 Click ADL: 18    Treatment & Education:  Patient alert and oriented x 3; able to follow 4/4 one step commands.  Patient attentive and interactive throughout the session.      Patient left supine with all lines intact, call button in reach, and bed alarm on    GOALS:   Multidisciplinary Problems       Occupational Therapy Goals          Problem: Occupational Therapy    Goal Priority Disciplines Outcome Interventions   Occupational Therapy Goal     OT, PT/OT Ongoing, Progressing    Description: Goals set 6/6 to be addressed for 14 days with expiration date, 6/20:  Patient will increase functional independence with ADLs by performing:    Patient will demonstrate rolling to the right with modified independence.  Not met   Patient will demonstrate rolling to the left with modified independence.   Not met  Patient will demonstrate supine -sit with modified independence.   Not met  Patient will demonstrate stand pivot transfers with modified independence.   Not met  Patient will demonstrate grooming while standing with modified independence.   Not met  Patient will demonstrate upper body dressing with modified independence while seated EOB.   Not met  Patient will demonstrate lower body dressing with modified independence while seated EOB.   Not met  Patient will demonstrate toileting with modified independence.   Not met  Patient will demonstrate bathing while seated EOB with modified independence.   Not met  Patient's family / caregiver will demonstrate independence and safety with assisting patient with self-care skills and functional mobility.     Not met                                 Time Tracking:     OT Date of Treatment: 06/07/23  OT Start Time: 0652  OT Stop Time: 0715  OT Total Time  (min): 23 min    Billable Minutes:Self Care/Home Management 23    OT/STARR: OT          6/7/2023

## 2023-06-07 NOTE — NURSING
POC reviewed with pt and pt verbalized understanding. Questions/Concerns addressed. A&Ox3. Calm/cooperative. NADN. Respirations equal and unlabored. Bed in low position, side rails up x3.  Aspiration/Seizure/Safety/Fall precautions in place per facility protocol for safety. Instructed pt to press call bell for assistance if needed pt verbalized understanding. VS stable. Neuro assessment completed see assessment. Will continue to monitor closely throughout this [id number] nightshift Safety maintained. Call bell in reach. Bed alarm activated for safety.Took night medications swallows without difficulty. Continent of B/B. Pt is on bedrest encouraged/ weight shifted to turn Q 2 hours to prevent skin breakdown.Telemetry monitor intact for monitoring.  Perwick intact to suction no complications see assessment. Spouse to remain @ bedside.     0.9%NS infusing @ 75 ml/hr to PIV RFA no complications.

## 2023-06-07 NOTE — PLAN OF CARE
[facility] - Neurosurgery (Hospital)  Discharge Final Note    Primary Care Provider: Hubert Barrera MD    Expected Discharge Date: 6/7/2023    Patient to be discharged home.  The patient will have home health with [facility].  Family to provide transportation home.  Neurosurgery clinic to schedule follow up appointment.    Future Appointments   Date Time Provider Department Center   6/13/2023  1:30 PM Gasper Griffith MD [facility] ENT [facility]        Final Discharge Note (most recent)       Final Note - 06/07/23 1442          Final Note    Assessment Type Final Discharge Note     Anticipated Discharge Disposition Home-Health Care Svc        Post-Acute Status    Post-Acute Authorization Home Health     Home Health Status Set-up Complete/Auth obtained     Discharge Delays None known at this time                     Important Message from Medicare

## 2023-06-07 NOTE — PLAN OF CARE
Problem: Urinary Retention  Goal: Effective Urinary Elimination  Outcome: Ongoing, Progressing          Problem: Pain Acute  Goal: Acceptable Pain Control and Functional Ability  Outcome: Ongoing, Progressing     POC reviewed with the patient and they verbalized understanding.concepcion cath in place, for UR, prn around the clock. Dc home soon.family at bedside . All comments and concerns addressed. Bed locked in lowest position with bed alarm set, call light within reach. Safety precautions maintained. VSS, see flowsheets. No events this shift. Will continue to monitor for changes to POC and clinical condition.

## 2023-06-07 NOTE — DISCHARGE SUMMARY
[facility] - Neurosurgery ([facility])  Neurosurgery  Discharge Summary      Patient Name: Tona Zafar  MRN: [id number]  Admission Date: 6/5/2023  Hospital Length of Stay: 2 days  Discharge Date and Time: 6/7/2023  2:39 PM  Attending Physician: Rangel Perez DO.  Discharging Provider: Marcie Segovia PA-C  Primary Care Provider: Hubert Barrera MD    HPI:   68 F presents for elective L temporal craniotomy for CSF leak repair from tegmen defect on 6/5/2023.      Procedure(s) (LRB):  REPAIR, CSF LEAK, CRANIUM, MIDDLE FOSSA APPROACH (Left)  REPAIR, CSF LEAK, CRANIUM, MIDDLE FOSSA APPROACH (Left)  LUMBAR PUNCTURE (N/A)     Hospital Course: 6/7: Patient stepped down from [facility]. NAEON. AFVSS. No complaints today besides trouble sleeping. Pain controlled. Neuro stable. Patient unable to void after concepcion removal. Bladder scan today with 700 mL, straight cathed for second time since concepcion removed. Will replace concepcion and keep for 1 week. Patient will f/u with urology at that time. Patient is medically stable for discharge to home with home health. Incision care and activity recommendations reviewed. Plan of care discussed with patient and she voiced understanding. All her questions were answered. Follow-up in Neurosurgery clinic arranged.         Goals of Care Treatment Preferences:  Code Status: Full Code      Consults:     Significant Diagnostic Studies: Labs:   CMP   Recent Labs   Lab 06/06/23  0128 06/06/23  0301 06/07/23  0300    142 140   K 3.2* 3.9 3.8   * 111* 111*   CO2 18* 23 22*    118* 93   BUN 7* 9 9   CREATININE 0.6 0.8 0.7   CALCIUM 6.2* 8.1* 8.3*   PROT 4.2* 5.5* 5.2*   ALBUMIN 2.2* 2.8* 2.5*   BILITOT 0.4 0.5 0.5   ALKPHOS 45* 61 55   AST 18 21 21   ALT 9* 12 10   ANIONGAP 5* 8 7*    and CBC   Recent Labs   Lab 06/06/23  0128 06/06/23  0301 06/07/23  0300   WBC 10.26 12.27 7.89   HGB 9.1* 10.5* 9.0*   HCT 29.1* 33.8* 30.4*    220 161     Radiology: CT scan: head without contrast,  temporal bone without contrast    Pending Diagnostic Studies:     Procedure Component Value Units Date/Time    Specimen to Pathology, Surgery ENT [[id number]] Collected: 06/05/23 1057    Order Status: Sent Lab Status: In process Updated: 06/06/23 0757    Specimen: Tissue         Final Active Diagnoses:    Diagnosis Date Noted POA    PRINCIPAL PROBLEM:  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] 04/12/2023 Yes    Post-operative nausea and vomiting [R11.2, Z98.890] 06/05/2023 Yes    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] 04/19/2023 Not Applicable      Problems Resolved During this Admission:      Discharged Condition: good     Disposition: Home-Health Care Sv    Follow Up:    Patient Instructions:      Ambulatory referral/consult to Urology   Standing Status: Future   Referral Priority: Routine Referral Type: Consultation   Referral Reason: Specialty Services Required   Requested Specialty: Urology   Number of Visits Requested: 1     Ambulatory referral/consult to Home Health   Standing Status: Future   Referral Priority: Routine Referral Type: Home Health   Referral Reason: Specialty Services Required   Requested Specialty: Home Health Services   Number of Visits Requested: 1     Notify your health care provider if you experience any of the following:  increased confusion or weakness     Notify your health care provider if you experience any of the following:  persistent dizziness, light-headedness, or visual disturbances     Notify your health care provider if you experience any of the following:  worsening rash     Notify your health care provider if you experience any of the following:  severe persistent headache     Notify your health care provider if you experience any of the following:  difficulty breathing or increased cough     Notify your health care provider if you experience any of the following:  redness, tenderness, or signs of infection (pain, swelling, redness, odor or green/yellow discharge around incision  site)     Notify your health care provider if you experience any of the following:  severe uncontrolled pain     Notify your health care provider if you experience any of the following:  persistent nausea and vomiting or diarrhea     Notify your health care provider if you experience any of the following:  temperature >100.4     Activity as tolerated     Medications:  Reconciled Home Medications:      Medication List      START taking these medications    gabapentin 300 MG capsule  Commonly known as: NEURONTIN  Take 1 capsule (300 mg total) by mouth 3 (three) times daily.     levETIRAcetam 500 MG Tab  Commonly known as: KEPPRA  Take 1 tablet (500 mg total) by mouth 2 (two) times daily. for 5 days     oxyCODONE-acetaminophen  mg per tablet  Commonly known as: PERCOCET  Take 1 tablet by mouth every 6 (six) hours as needed for Pain.     tamsulosin 0.4 mg Cap  Commonly known as: FLOMAX  Take 1 capsule (0.4 mg total) by mouth once daily.  Start taking on: June 8, 2023        CONTINUE taking these medications    hydrOXYzine HCL 25 MG tablet  Commonly known as: ATARAX  Take 1 tablet (25 mg total) by mouth 3 (three) times daily as needed for Anxiety.     K2 PLUS D3 1,000-100 unit-mcg Tab  Generic drug: vitamin D3-vitamin K2 (MK4)  Take by mouth.     DWIGHT EXTRACT ORAL  Take by mouth.     magnesium 200 mg Tab  Take by mouth once daily.     magnesium hydroxide 400 mg/5 ml 400 mg/5 mL Susp  Commonly known as: MILK OF MAGNESIA  Take 30 mLs by mouth daily as needed.     microfibrillar collagen powder  Apply 1 g topically as needed.     NITRIC OXIDE GAS INHL  Inhale into the lungs. Capsule     tumeric-ging-olive-oreg-capryl 100 mg-150 mg- 50 mg-150 mg Cap  Take by mouth.     UNABLE TO FIND  medication name: BEET root 1 capsule Daily     zinc 50 mg Tab  Take 1 tablet by mouth once daily.            Marcie Segovia PA-C  Neurosurgery  [facility] - Neurosurgery ([facility])

## 2023-06-07 NOTE — NURSING
Bladder scanned pt = 261 placed call to neurosurgery to inform on call provider spoke to Dr. Aashish Mario. No new orders received at this time. Safety maintained.

## 2023-06-08 ENCOUNTER — HOSPITAL ENCOUNTER (INPATIENT)
Facility: HOSPITAL | Age: 68
LOS: 7 days | Discharge: HOME HEALTH | DRG: 917 | End: 2023-06-15
Attending: PSYCHIATRY & NEUROLOGY | Admitting: PSYCHIATRY & NEUROLOGY
Payer: MEDICARE

## 2023-06-08 ENCOUNTER — PATIENT OUTREACH (OUTPATIENT)
Dept: ADMINISTRATIVE | Facility: CLINIC | Age: 68
End: 2023-06-08
Payer: MEDICARE

## 2023-06-08 DIAGNOSIS — Q16.5 TEGMEN DEFECT OF BASE OF SKULL: ICD-10-CM

## 2023-06-08 DIAGNOSIS — K72.90 LIVER FAILURE: ICD-10-CM

## 2023-06-08 DIAGNOSIS — R79.89 ELEVATED TROPONIN: ICD-10-CM

## 2023-06-08 DIAGNOSIS — N17.9 AKI (ACUTE KIDNEY INJURY): ICD-10-CM

## 2023-06-08 DIAGNOSIS — R00.0 TACHYCARDIA: ICD-10-CM

## 2023-06-08 DIAGNOSIS — N17.0 ATN (ACUTE TUBULAR NECROSIS): ICD-10-CM

## 2023-06-08 DIAGNOSIS — R79.89 INCREASED AMMONIA LEVEL: ICD-10-CM

## 2023-06-08 DIAGNOSIS — I24.89 DEMAND ISCHEMIA OF MYOCARDIUM: ICD-10-CM

## 2023-06-08 DIAGNOSIS — K72.00 ACUTE LIVER FAILURE WITHOUT HEPATIC COMA: Primary | ICD-10-CM

## 2023-06-08 DIAGNOSIS — N30.00 ACUTE CYSTITIS WITHOUT HEMATURIA: ICD-10-CM

## 2023-06-08 DIAGNOSIS — G93.40 ACUTE ENCEPHALOPATHY: ICD-10-CM

## 2023-06-08 LAB
ABO + RH BLD: NORMAL
ALBUMIN SERPL BCP-MCNC: 2.6 G/DL (ref 3.5–5.2)
ALP SERPL-CCNC: 109 U/L (ref 55–135)
ALT SERPL W/O P-5'-P-CCNC: 3275 U/L (ref 10–44)
AMMONIA PLAS-SCNC: 90 UMOL/L (ref 10–50)
ANION GAP SERPL CALC-SCNC: 9 MMOL/L (ref 8–16)
APAP SERPL-MCNC: <3 UG/ML (ref 10–20)
APTT PPP: 22.4 SEC (ref 21–32)
AST SERPL-CCNC: 8931 U/L (ref 10–40)
BASOPHILS # BLD AUTO: 0.03 K/UL (ref 0–0.2)
BASOPHILS NFR BLD: 0.5 % (ref 0–1.9)
BILIRUB SERPL-MCNC: 0.4 MG/DL (ref 0.1–1)
BLD GP AB SCN CELLS X3 SERPL QL: NORMAL
BUN SERPL-MCNC: 23 MG/DL (ref 8–23)
CALCIUM SERPL-MCNC: 8.2 MG/DL (ref 8.7–10.5)
CHLORIDE SERPL-SCNC: 109 MMOL/L (ref 95–110)
CK SERPL-CCNC: 849 U/L (ref 20–180)
CO2 SERPL-SCNC: 21 MMOL/L (ref 23–29)
CREAT SERPL-MCNC: 1.9 MG/DL (ref 0.5–1.4)
DIFFERENTIAL METHOD: ABNORMAL
EOSINOPHIL # BLD AUTO: 0 K/UL (ref 0–0.5)
EOSINOPHIL NFR BLD: 0.2 % (ref 0–8)
ERYTHROCYTE [DISTWIDTH] IN BLOOD BY AUTOMATED COUNT: 14 % (ref 11.5–14.5)
EST. GFR  (NO RACE VARIABLE): 28.2 ML/MIN/1.73 M^2
FIBRINOGEN PPP-MCNC: 469 MG/DL (ref 182–400)
GLUCOSE SERPL-MCNC: 102 MG/DL (ref 70–110)
HCT VFR BLD AUTO: 31.2 % (ref 37–48.5)
HGB BLD-MCNC: 9.7 G/DL (ref 12–16)
IMM GRANULOCYTES # BLD AUTO: 0.04 K/UL (ref 0–0.04)
IMM GRANULOCYTES NFR BLD AUTO: 0.7 % (ref 0–0.5)
INR PPP: 1.2 (ref 0.8–1.2)
LACTATE SERPL-SCNC: 1.1 MMOL/L (ref 0.5–2.2)
LYMPHOCYTES # BLD AUTO: 1.3 K/UL (ref 1–4.8)
LYMPHOCYTES NFR BLD: 22.6 % (ref 18–48)
MCH RBC QN AUTO: 27.4 PG (ref 27–31)
MCHC RBC AUTO-ENTMCNC: 31.1 G/DL (ref 32–36)
MCV RBC AUTO: 88 FL (ref 82–98)
MONOCYTES # BLD AUTO: 0.5 K/UL (ref 0.3–1)
MONOCYTES NFR BLD: 8.1 % (ref 4–15)
NEUTROPHILS # BLD AUTO: 3.9 K/UL (ref 1.8–7.7)
NEUTROPHILS NFR BLD: 67.9 % (ref 38–73)
NRBC BLD-RTO: 0 /100 WBC
PLATELET # BLD AUTO: 155 K/UL (ref 150–450)
PMV BLD AUTO: 10.4 FL (ref 9.2–12.9)
POCT GLUCOSE: 122 MG/DL (ref 70–110)
POTASSIUM SERPL-SCNC: 4.2 MMOL/L (ref 3.5–5.1)
PROT SERPL-MCNC: 5.4 G/DL (ref 6–8.4)
PROTHROMBIN TIME: 12.3 SEC (ref 9–12.5)
RBC # BLD AUTO: 3.54 M/UL (ref 4–5.4)
SODIUM SERPL-SCNC: 139 MMOL/L (ref 136–145)
SPECIMEN OUTDATE: NORMAL
TROPONIN I SERPL DL<=0.01 NG/ML-MCNC: 2.9 NG/ML (ref 0–0.03)
WBC # BLD AUTO: 5.7 K/UL (ref 3.9–12.7)

## 2023-06-08 PROCEDURE — 85610 PROTHROMBIN TIME: CPT | Mod: 91 | Performed by: PSYCHIATRY & NEUROLOGY

## 2023-06-08 PROCEDURE — 99223 1ST HOSP IP/OBS HIGH 75: CPT | Mod: GC,,, | Performed by: INTERNAL MEDICINE

## 2023-06-08 PROCEDURE — 83036 HEMOGLOBIN GLYCOSYLATED A1C: CPT | Performed by: EMERGENCY MEDICINE

## 2023-06-08 PROCEDURE — 93010 ELECTROCARDIOGRAM REPORT: CPT | Mod: ,,, | Performed by: INTERNAL MEDICINE

## 2023-06-08 PROCEDURE — 82977 ASSAY OF GGT: CPT | Performed by: EMERGENCY MEDICINE

## 2023-06-08 PROCEDURE — 86015 ACTIN ANTIBODY EACH: CPT | Performed by: STUDENT IN AN ORGANIZED HEALTH CARE EDUCATION/TRAINING PROGRAM

## 2023-06-08 PROCEDURE — 82390 ASSAY OF CERULOPLASMIN: CPT | Performed by: STUDENT IN AN ORGANIZED HEALTH CARE EDUCATION/TRAINING PROGRAM

## 2023-06-08 PROCEDURE — 86381 MITOCHONDRIAL ANTIBODY EACH: CPT | Performed by: STUDENT IN AN ORGANIZED HEALTH CARE EDUCATION/TRAINING PROGRAM

## 2023-06-08 PROCEDURE — 85025 COMPLETE CBC W/AUTO DIFF WBC: CPT | Mod: 91 | Performed by: PSYCHIATRY & NEUROLOGY

## 2023-06-08 PROCEDURE — 99223 PR INITIAL HOSPITAL CARE,LEVL III: ICD-10-PCS | Mod: GC,,, | Performed by: INTERNAL MEDICINE

## 2023-06-08 PROCEDURE — 82140 ASSAY OF AMMONIA: CPT | Mod: 91 | Performed by: STUDENT IN AN ORGANIZED HEALTH CARE EDUCATION/TRAINING PROGRAM

## 2023-06-08 PROCEDURE — 84484 ASSAY OF TROPONIN QUANT: CPT | Mod: 91 | Performed by: STUDENT IN AN ORGANIZED HEALTH CARE EDUCATION/TRAINING PROGRAM

## 2023-06-08 PROCEDURE — 99223 1ST HOSP IP/OBS HIGH 75: CPT | Mod: ,,, | Performed by: NEUROLOGICAL SURGERY

## 2023-06-08 PROCEDURE — 93005 ELECTROCARDIOGRAM TRACING: CPT

## 2023-06-08 PROCEDURE — 80061 LIPID PANEL: CPT | Performed by: EMERGENCY MEDICINE

## 2023-06-08 PROCEDURE — 80143 DRUG ASSAY ACETAMINOPHEN: CPT | Mod: 91 | Performed by: STUDENT IN AN ORGANIZED HEALTH CARE EDUCATION/TRAINING PROGRAM

## 2023-06-08 PROCEDURE — 86900 BLOOD TYPING SEROLOGIC ABO: CPT | Performed by: PSYCHIATRY & NEUROLOGY

## 2023-06-08 PROCEDURE — 80053 COMPREHEN METABOLIC PANEL: CPT | Mod: 91 | Performed by: PSYCHIATRY & NEUROLOGY

## 2023-06-08 PROCEDURE — 25000003 PHARM REV CODE 250: Performed by: STUDENT IN AN ORGANIZED HEALTH CARE EDUCATION/TRAINING PROGRAM

## 2023-06-08 PROCEDURE — 82103 ALPHA-1-ANTITRYPSIN TOTAL: CPT | Performed by: STUDENT IN AN ORGANIZED HEALTH CARE EDUCATION/TRAINING PROGRAM

## 2023-06-08 PROCEDURE — 99291 CRITICAL CARE FIRST HOUR: CPT | Mod: ,,, | Performed by: PSYCHIATRY & NEUROLOGY

## 2023-06-08 PROCEDURE — 20000000 HC ICU ROOM

## 2023-06-08 PROCEDURE — 99291 PR CRITICAL CARE, E/M 30-74 MINUTES: ICD-10-PCS | Mod: ,,, | Performed by: PSYCHIATRY & NEUROLOGY

## 2023-06-08 PROCEDURE — 99223 PR INITIAL HOSPITAL CARE,LEVL III: ICD-10-PCS | Mod: ,,, | Performed by: NEUROLOGICAL SURGERY

## 2023-06-08 PROCEDURE — 84443 ASSAY THYROID STIM HORMONE: CPT | Performed by: EMERGENCY MEDICINE

## 2023-06-08 PROCEDURE — 86038 ANTINUCLEAR ANTIBODIES: CPT | Performed by: STUDENT IN AN ORGANIZED HEALTH CARE EDUCATION/TRAINING PROGRAM

## 2023-06-08 PROCEDURE — 85730 THROMBOPLASTIN TIME PARTIAL: CPT | Performed by: PSYCHIATRY & NEUROLOGY

## 2023-06-08 PROCEDURE — 84466 ASSAY OF TRANSFERRIN: CPT | Performed by: STUDENT IN AN ORGANIZED HEALTH CARE EDUCATION/TRAINING PROGRAM

## 2023-06-08 PROCEDURE — 84439 ASSAY OF FREE THYROXINE: CPT | Performed by: EMERGENCY MEDICINE

## 2023-06-08 PROCEDURE — 83605 ASSAY OF LACTIC ACID: CPT | Mod: 91 | Performed by: STUDENT IN AN ORGANIZED HEALTH CARE EDUCATION/TRAINING PROGRAM

## 2023-06-08 PROCEDURE — 85384 FIBRINOGEN ACTIVITY: CPT | Performed by: PSYCHIATRY & NEUROLOGY

## 2023-06-08 PROCEDURE — 80076 HEPATIC FUNCTION PANEL: CPT | Performed by: NURSE PRACTITIONER

## 2023-06-08 PROCEDURE — 93010 EKG 12-LEAD: ICD-10-PCS | Mod: ,,, | Performed by: INTERNAL MEDICINE

## 2023-06-08 PROCEDURE — 80143 DRUG ASSAY ACETAMINOPHEN: CPT | Mod: 91 | Performed by: EMERGENCY MEDICINE

## 2023-06-08 PROCEDURE — 80321 ALCOHOLS BIOMARKERS 1OR 2: CPT | Performed by: STUDENT IN AN ORGANIZED HEALTH CARE EDUCATION/TRAINING PROGRAM

## 2023-06-08 PROCEDURE — 82728 ASSAY OF FERRITIN: CPT | Performed by: STUDENT IN AN ORGANIZED HEALTH CARE EDUCATION/TRAINING PROGRAM

## 2023-06-08 PROCEDURE — 82550 ASSAY OF CK (CPK): CPT | Performed by: STUDENT IN AN ORGANIZED HEALTH CARE EDUCATION/TRAINING PROGRAM

## 2023-06-08 PROCEDURE — 80179 DRUG ASSAY SALICYLATE: CPT | Performed by: EMERGENCY MEDICINE

## 2023-06-08 PROCEDURE — 82977 ASSAY OF GGT: CPT | Mod: 91 | Performed by: STUDENT IN AN ORGANIZED HEALTH CARE EDUCATION/TRAINING PROGRAM

## 2023-06-08 RX ORDER — HEPARIN SODIUM 5000 [USP'U]/ML
5000 INJECTION, SOLUTION INTRAVENOUS; SUBCUTANEOUS EVERY 8 HOURS
Status: DISCONTINUED | OUTPATIENT
Start: 2023-06-08 | End: 2023-06-08

## 2023-06-08 RX ORDER — LABETALOL HCL 20 MG/4 ML
10 SYRINGE (ML) INTRAVENOUS
Status: DISCONTINUED | OUTPATIENT
Start: 2023-06-08 | End: 2023-06-15 | Stop reason: HOSPADM

## 2023-06-08 RX ORDER — SODIUM CHLORIDE 0.9 % (FLUSH) 0.9 %
10 SYRINGE (ML) INJECTION
Status: DISCONTINUED | OUTPATIENT
Start: 2023-06-08 | End: 2023-06-15 | Stop reason: HOSPADM

## 2023-06-08 RX ORDER — LACTULOSE 10 G/15ML
15 SOLUTION ORAL EVERY 6 HOURS
Status: DISCONTINUED | OUTPATIENT
Start: 2023-06-09 | End: 2023-06-15 | Stop reason: HOSPADM

## 2023-06-08 RX ADMIN — SODIUM CHLORIDE 500 ML: 9 INJECTION, SOLUTION INTRAVENOUS at 08:06

## 2023-06-08 NOTE — ASSESSMENT & PLAN NOTE
68 y.o. F with hx of arthritis, diverticulosis, ischemic colitis, gastroparesis, tegmen defect s/p L temporal craniotomy and defect repair with cement and intradural dura matrix inlay at the site of encephalocele on June 5, who presented to [facility] ED for AMS. Found to have acute liver failure, KAROLINA, and UTI, concerning for septic shock on levo.     CNS    CV    Pulm     Hem    ID

## 2023-06-08 NOTE — PROGRESS NOTES
Pt currently in ED for altered mental status and is currently in the process of being transferred to Neuro Critical Care.

## 2023-06-08 NOTE — HPI
68 y.o. F with hx of arthritis, diverticulosis, ischemic colitis, gastroparesis, tegmen defect s/p L temporal craniotomy and defect repair with cement and intradural dura matrix inlay at the site of encephalocele on June 5, who presented to [facility] ED for AMS. Patient was discharge from the hospital 6/7 s/p craniotomy and tegmen defect repair. She was discharged with prescription for gabapentin, keppra, percocet, she also has indwelling Cedillo catheter and tamsulosin for urinary retention post-op. She subsequently presented to [place] [facility] on June 8 with altered mental status.  Patient's  noted that the patient was weak and could not stand on the way home from the hospital.  At 6:00 p.m. he gave her gabapentin and oxycodone.  He gave a subsequent dose at 2:00 a.m..  At 6:00 a.m. he found her unresponsive and sweating.  EMS was contacted.  O2 sats initially were in the low 50% range.  She received 4 mg of Narcan resulting in agitation.  On exam she was somnolent and would arouse to physical stimulus.  She would follow basic commands such as squeezing fingers when she was awake.  GCS was noted to be around 10.  She was initially hypotensive requiring Levophed, . In the emergency department she received LR and Narcan.  After the Narcan, she was disoriented but able to say her name and recognize family.  During her stay she can became somnolent.  She again responded to Narcan.  [facility] ED is placed her on a Narcan infusion. She was found to have acute liver failure, KAROLINA, elevatedt troponin and UTI. On broad spectrum abx.       CT head had postsurgical changes of the left lateral calvarium with decreasing pneumocephalus.  No evidence for acute intracranial abnormality.  Complete opacification of the left mastoid air cells as well as the left middle ear.       Transferred to [facility] since recent discharge.

## 2023-06-09 PROBLEM — N30.00 ACUTE CYSTITIS: Status: ACTIVE | Noted: 2023-06-09

## 2023-06-09 PROBLEM — I21.4 NSTEMI (NON-ST ELEVATED MYOCARDIAL INFARCTION): Status: ACTIVE | Noted: 2023-06-09

## 2023-06-09 PROBLEM — N17.9 AKI (ACUTE KIDNEY INJURY): Status: ACTIVE | Noted: 2023-06-09

## 2023-06-09 PROBLEM — I24.89 DEMAND ISCHEMIA OF MYOCARDIUM: Status: ACTIVE | Noted: 2023-06-09

## 2023-06-09 PROBLEM — T50.901A OVERDOSE: Status: ACTIVE | Noted: 2023-06-09

## 2023-06-09 PROBLEM — G93.40 ACUTE ENCEPHALOPATHY: Status: ACTIVE | Noted: 2023-06-09

## 2023-06-09 PROBLEM — R79.89 INCREASED AMMONIA LEVEL: Status: ACTIVE | Noted: 2023-06-09

## 2023-06-09 LAB
A1AT SERPL-MCNC: 246 MG/DL (ref 100–190)
ALBUMIN SERPL BCP-MCNC: 1.8 G/DL (ref 3.5–5.2)
ALBUMIN SERPL BCP-MCNC: 1.8 G/DL (ref 3.5–5.2)
ALBUMIN SERPL BCP-MCNC: 2.1 G/DL (ref 3.5–5.2)
ALBUMIN SERPL BCP-MCNC: 2.4 G/DL (ref 3.5–5.2)
ALBUMIN SERPL BCP-MCNC: 2.4 G/DL (ref 3.5–5.2)
ALBUMIN SERPL BCP-MCNC: 2.5 G/DL (ref 3.5–5.2)
ALBUMIN SERPL BCP-MCNC: 2.7 G/DL (ref 3.5–5.2)
ALBUMIN SERPL BCP-MCNC: 2.7 G/DL (ref 3.5–5.2)
ALP SERPL-CCNC: 110 U/L (ref 55–135)
ALP SERPL-CCNC: 114 U/L (ref 55–135)
ALP SERPL-CCNC: 114 U/L (ref 55–135)
ALP SERPL-CCNC: 124 U/L (ref 55–135)
ALP SERPL-CCNC: 124 U/L (ref 55–135)
ALP SERPL-CCNC: 82 U/L (ref 55–135)
ALP SERPL-CCNC: 82 U/L (ref 55–135)
ALP SERPL-CCNC: 97 U/L (ref 55–135)
ALT SERPL W/O P-5'-P-CCNC: 2233 U/L (ref 10–44)
ALT SERPL W/O P-5'-P-CCNC: 2233 U/L (ref 10–44)
ALT SERPL W/O P-5'-P-CCNC: 2626 U/L (ref 10–44)
ALT SERPL W/O P-5'-P-CCNC: 2904 U/L (ref 10–44)
ALT SERPL W/O P-5'-P-CCNC: 2904 U/L (ref 10–44)
ALT SERPL W/O P-5'-P-CCNC: 3213 U/L (ref 10–44)
ALT SERPL W/O P-5'-P-CCNC: 3298 U/L (ref 10–44)
ALT SERPL W/O P-5'-P-CCNC: 3298 U/L (ref 10–44)
AMMONIA PLAS-SCNC: 84 UMOL/L (ref 10–50)
AMORPH CRY UR QL COMP ASSIST: ABNORMAL
ANA SER QL IF: NORMAL
ANION GAP SERPL CALC-SCNC: 11 MMOL/L (ref 8–16)
ANION GAP SERPL CALC-SCNC: 4 MMOL/L (ref 8–16)
ANION GAP SERPL CALC-SCNC: 8 MMOL/L (ref 8–16)
ANION GAP SERPL CALC-SCNC: 9 MMOL/L (ref 8–16)
ANION GAP SERPL CALC-SCNC: 9 MMOL/L (ref 8–16)
APAP SERPL-MCNC: <3 UG/ML (ref 10–20)
APTT PPP: 21.9 SEC (ref 21–32)
ASCENDING AORTA: 2.89 CM
AST SERPL-CCNC: 3748 U/L (ref 10–40)
AST SERPL-CCNC: 3748 U/L (ref 10–40)
AST SERPL-CCNC: 4400 U/L (ref 10–40)
AST SERPL-CCNC: 4400 U/L (ref 10–40)
AST SERPL-CCNC: 5262 U/L (ref 10–40)
AST SERPL-CCNC: 5262 U/L (ref 10–40)
AST SERPL-CCNC: 6047 U/L (ref 10–40)
AST SERPL-CCNC: 7957 U/L (ref 10–40)
AV INDEX (PROSTH): 1.03
AV MEAN GRADIENT: 2 MMHG
AV PEAK GRADIENT: 3 MMHG
AV VALVE AREA: 3.16 CM2
AV VELOCITY RATIO: 1.18
BACTERIA #/AREA URNS AUTO: ABNORMAL /HPF
BASOPHILS # BLD AUTO: 0.02 K/UL (ref 0–0.2)
BASOPHILS NFR BLD: 0.4 % (ref 0–1.9)
BILIRUB DIRECT SERPL-MCNC: 0.2 MG/DL (ref 0.1–0.3)
BILIRUB SERPL-MCNC: 0.3 MG/DL (ref 0.1–1)
BILIRUB SERPL-MCNC: 0.3 MG/DL (ref 0.1–1)
BILIRUB SERPL-MCNC: 0.4 MG/DL (ref 0.1–1)
BILIRUB SERPL-MCNC: 0.5 MG/DL (ref 0.1–1)
BILIRUB SERPL-MCNC: 0.5 MG/DL (ref 0.1–1)
BILIRUB UR QL STRIP: NEGATIVE
BSA FOR ECHO PROCEDURE: 2.07 M2
BUN SERPL-MCNC: 24 MG/DL (ref 8–23)
BUN SERPL-MCNC: 32 MG/DL (ref 8–23)
BUN SERPL-MCNC: 33 MG/DL (ref 8–23)
CA-I BLDV-SCNC: 1.17 MMOL/L (ref 1.06–1.42)
CALCIUM SERPL-MCNC: 6.2 MG/DL (ref 8.7–10.5)
CALCIUM SERPL-MCNC: 7.1 MG/DL (ref 8.7–10.5)
CALCIUM SERPL-MCNC: 7.1 MG/DL (ref 8.7–10.5)
CALCIUM SERPL-MCNC: 7.8 MG/DL (ref 8.7–10.5)
CALCIUM SERPL-MCNC: 8 MG/DL (ref 8.7–10.5)
CERULOPLASMIN SERPL-MCNC: 31 MG/DL (ref 15–45)
CHLORIDE SERPL-SCNC: 111 MMOL/L (ref 95–110)
CHLORIDE SERPL-SCNC: 111 MMOL/L (ref 95–110)
CHLORIDE SERPL-SCNC: 114 MMOL/L (ref 95–110)
CHLORIDE SERPL-SCNC: 114 MMOL/L (ref 95–110)
CHLORIDE SERPL-SCNC: 117 MMOL/L (ref 95–110)
CHOLEST SERPL-MCNC: 120 MG/DL (ref 120–199)
CHOLEST/HDLC SERPL: 3.8 {RATIO} (ref 2–5)
CLARITY UR REFRACT.AUTO: ABNORMAL
CO2 SERPL-SCNC: 17 MMOL/L (ref 23–29)
CO2 SERPL-SCNC: 17 MMOL/L (ref 23–29)
CO2 SERPL-SCNC: 18 MMOL/L (ref 23–29)
CO2 SERPL-SCNC: 22 MMOL/L (ref 23–29)
CO2 SERPL-SCNC: 23 MMOL/L (ref 23–29)
COLOR UR AUTO: ABNORMAL
CREAT SERPL-MCNC: 1.9 MG/DL (ref 0.5–1.4)
CREAT SERPL-MCNC: 3.1 MG/DL (ref 0.5–1.4)
CREAT SERPL-MCNC: 3.2 MG/DL (ref 0.5–1.4)
CV ECHO LV RWT: 0.39 CM
DIFFERENTIAL METHOD: ABNORMAL
DOP CALC AO PEAK VEL: 0.87 M/S
DOP CALC AO VTI: 17.34 CM
DOP CALC LVOT AREA: 3.1 CM2
DOP CALC LVOT DIAMETER: 1.98 CM
DOP CALC LVOT PEAK VEL: 1.03 M/S
DOP CALC LVOT STROKE VOLUME: 54.78 CM3
DOP CALCLVOT PEAK VEL VTI: 17.8 CM
E WAVE DECELERATION TIME: 239.94 MSEC
E/A RATIO: 0.87
E/E' RATIO: 4.91 M/S
ECHO LV POSTERIOR WALL: 0.84 CM (ref 0.6–1.1)
EJECTION FRACTION: 50 %
EOSINOPHIL # BLD AUTO: 0 K/UL (ref 0–0.5)
EOSINOPHIL NFR BLD: 0.2 % (ref 0–8)
ERYTHROCYTE [DISTWIDTH] IN BLOOD BY AUTOMATED COUNT: 13.7 % (ref 11.5–14.5)
EST. GFR  (NO RACE VARIABLE): 15.1 ML/MIN/1.73 M^2
EST. GFR  (NO RACE VARIABLE): 15.7 ML/MIN/1.73 M^2
EST. GFR  (NO RACE VARIABLE): 28.2 ML/MIN/1.73 M^2
ESTIMATED AVG GLUCOSE: 100 MG/DL (ref 68–131)
FERRITIN SERPL-MCNC: 9611 NG/ML (ref 20–300)
FINAL PATHOLOGIC DIAGNOSIS: NORMAL
FRACTIONAL SHORTENING: 26 % (ref 28–44)
GGT SERPL-CCNC: 80 U/L (ref 8–55)
GGT SERPL-CCNC: 83 U/L (ref 8–55)
GLUCOSE SERPL-MCNC: 128 MG/DL (ref 70–110)
GLUCOSE SERPL-MCNC: 75 MG/DL (ref 70–110)
GLUCOSE SERPL-MCNC: 86 MG/DL (ref 70–110)
GLUCOSE SERPL-MCNC: 86 MG/DL (ref 70–110)
GLUCOSE SERPL-MCNC: 87 MG/DL (ref 70–110)
GLUCOSE UR QL STRIP: NEGATIVE
GROSS: NORMAL
HBA1C MFR BLD: 5.1 % (ref 4–5.6)
HCT VFR BLD AUTO: 26.2 % (ref 37–48.5)
HDLC SERPL-MCNC: 32 MG/DL (ref 40–75)
HDLC SERPL: 26.7 % (ref 20–50)
HGB BLD-MCNC: 8.2 G/DL (ref 12–16)
HGB UR QL STRIP: ABNORMAL
HYALINE CASTS UR QL AUTO: 0 /LPF
IMM GRANULOCYTES # BLD AUTO: 0.07 K/UL (ref 0–0.04)
IMM GRANULOCYTES NFR BLD AUTO: 1.5 % (ref 0–0.5)
INR PPP: 1.2 (ref 0.8–1.2)
INTERVENTRICULAR SEPTUM: 0.8 CM (ref 0.6–1.1)
IRON SERPL-MCNC: 132 UG/DL (ref 30–160)
KETONES UR QL STRIP: ABNORMAL
LA MAJOR: 4.26 CM
LA MINOR: 4.56 CM
LA WIDTH: 3.44 CM
LDLC SERPL CALC-MCNC: 73.6 MG/DL (ref 63–159)
LEFT ATRIUM SIZE: 3.54 CM
LEFT ATRIUM VOLUME INDEX: 22.2 ML/M2
LEFT ATRIUM VOLUME: 45.59 CM3
LEFT INTERNAL DIMENSION IN SYSTOLE: 3.17 CM (ref 2.1–4)
LEFT VENTRICLE DIASTOLIC VOLUME INDEX: 40.01 ML/M2
LEFT VENTRICLE DIASTOLIC VOLUME: 82.02 ML
LEFT VENTRICLE MASS INDEX: 53 G/M2
LEFT VENTRICLE SYSTOLIC VOLUME INDEX: 19.6 ML/M2
LEFT VENTRICLE SYSTOLIC VOLUME: 40.12 ML
LEFT VENTRICULAR INTERNAL DIMENSION IN DIASTOLE: 4.28 CM (ref 3.5–6)
LEFT VENTRICULAR MASS: 107.99 G
LEUKOCYTE ESTERASE UR QL STRIP: ABNORMAL
LV LATERAL E/E' RATIO: 3.86 M/S
LV SEPTAL E/E' RATIO: 6.75 M/S
LYMPHOCYTES # BLD AUTO: 1.1 K/UL (ref 1–4.8)
LYMPHOCYTES NFR BLD: 22.6 % (ref 18–48)
Lab: NORMAL
MAGNESIUM SERPL-MCNC: 1.8 MG/DL (ref 1.6–2.6)
MCH RBC QN AUTO: 27.2 PG (ref 27–31)
MCHC RBC AUTO-ENTMCNC: 31.3 G/DL (ref 32–36)
MCV RBC AUTO: 87 FL (ref 82–98)
MICROSCOPIC COMMENT: ABNORMAL
MITOCHONDRIA AB TITR SER IF: NORMAL {TITER}
MONOCYTES # BLD AUTO: 0.4 K/UL (ref 0.3–1)
MONOCYTES NFR BLD: 7.8 % (ref 4–15)
MV PEAK A VEL: 0.62 M/S
MV PEAK E VEL: 0.54 M/S
MV STENOSIS PRESSURE HALF TIME: 69.58 MS
MV VALVE AREA P 1/2 METHOD: 3.16 CM2
NEUTROPHILS # BLD AUTO: 3.2 K/UL (ref 1.8–7.7)
NEUTROPHILS NFR BLD: 67.5 % (ref 38–73)
NITRITE UR QL STRIP: NEGATIVE
NONHDLC SERPL-MCNC: 88 MG/DL
NRBC BLD-RTO: 1 /100 WBC
PH UR STRIP: 6 [PH] (ref 5–8)
PHOSPHATE SERPL-MCNC: 2.2 MG/DL (ref 2.7–4.5)
PISA TR MAX VEL: 2.42 M/S
PLATELET # BLD AUTO: 143 K/UL (ref 150–450)
PMV BLD AUTO: 10.1 FL (ref 9.2–12.9)
POCT GLUCOSE: 105 MG/DL (ref 70–110)
POCT GLUCOSE: 105 MG/DL (ref 70–110)
POTASSIUM SERPL-SCNC: 2.9 MMOL/L (ref 3.5–5.1)
POTASSIUM SERPL-SCNC: 3.5 MMOL/L (ref 3.5–5.1)
POTASSIUM SERPL-SCNC: 3.5 MMOL/L (ref 3.5–5.1)
POTASSIUM SERPL-SCNC: 3.8 MMOL/L (ref 3.5–5.1)
POTASSIUM SERPL-SCNC: 3.9 MMOL/L (ref 3.5–5.1)
PROT SERPL-MCNC: 3.8 G/DL (ref 6–8.4)
PROT SERPL-MCNC: 3.8 G/DL (ref 6–8.4)
PROT SERPL-MCNC: 4.5 G/DL (ref 6–8.4)
PROT SERPL-MCNC: 5 G/DL (ref 6–8.4)
PROT SERPL-MCNC: 5 G/DL (ref 6–8.4)
PROT SERPL-MCNC: 5.3 G/DL (ref 6–8.4)
PROT SERPL-MCNC: 5.7 G/DL (ref 6–8.4)
PROT SERPL-MCNC: 5.7 G/DL (ref 6–8.4)
PROT UR QL STRIP: ABNORMAL
PROTHROMBIN TIME: 12.5 SEC (ref 9–12.5)
RA MAJOR: 4.51 CM
RA PRESSURE: 15 MMHG
RA WIDTH: 2.75 CM
RBC # BLD AUTO: 3.02 M/UL (ref 4–5.4)
RBC #/AREA URNS AUTO: >100 /HPF (ref 0–4)
RIGHT VENTRICULAR END-DIASTOLIC DIMENSION: 3.7 CM
RV TISSUE DOPPLER FREE WALL SYSTOLIC VELOCITY 1 (APICAL 4 CHAMBER VIEW): 12 CM/S
SALICYLATES SERPL-MCNC: <5 MG/DL (ref 15–30)
SATURATED IRON: 62 % (ref 20–50)
SINUS: 3 CM
SMOOTH MUSCLE AB TITR SER IF: NORMAL {TITER}
SODIUM SERPL-SCNC: 139 MMOL/L (ref 136–145)
SODIUM SERPL-SCNC: 140 MMOL/L (ref 136–145)
SODIUM SERPL-SCNC: 140 MMOL/L (ref 136–145)
SODIUM SERPL-SCNC: 142 MMOL/L (ref 136–145)
SODIUM SERPL-SCNC: 144 MMOL/L (ref 136–145)
SP GR UR STRIP: 1.01 (ref 1–1.03)
STJ: 2.76 CM
T4 FREE SERPL-MCNC: 0.8 NG/DL (ref 0.71–1.51)
TDI LATERAL: 0.14 M/S
TDI SEPTAL: 0.08 M/S
TDI: 0.11 M/S
TOTAL IRON BINDING CAPACITY: 212 UG/DL (ref 250–450)
TR MAX PG: 23 MMHG
TRANSFERRIN SERPL-MCNC: 143 MG/DL (ref 200–375)
TRICUSPID ANNULAR PLANE SYSTOLIC EXCURSION: 2.27 CM
TRIGL SERPL-MCNC: 72 MG/DL (ref 30–150)
TROPONIN I SERPL DL<=0.01 NG/ML-MCNC: 1.4 NG/ML (ref 0–0.03)
TROPONIN I SERPL DL<=0.01 NG/ML-MCNC: 1.53 NG/ML (ref 0–0.03)
TROPONIN I SERPL DL<=0.01 NG/ML-MCNC: 1.85 NG/ML (ref 0–0.03)
TROPONIN I SERPL DL<=0.01 NG/ML-MCNC: 1.86 NG/ML (ref 0–0.03)
TSH SERPL DL<=0.005 MIU/L-ACNC: 0.18 UIU/ML (ref 0.4–4)
TV REST PULMONARY ARTERY PRESSURE: 38 MMHG
URN SPEC COLLECT METH UR: ABNORMAL
VANCOMYCIN SERPL-MCNC: 16.1 UG/ML
WBC # BLD AUTO: 4.73 K/UL (ref 3.9–12.7)
WBC #/AREA URNS AUTO: 18 /HPF (ref 0–5)

## 2023-06-09 PROCEDURE — 85730 THROMBOPLASTIN TIME PARTIAL: CPT | Performed by: EMERGENCY MEDICINE

## 2023-06-09 PROCEDURE — 99291 PR CRITICAL CARE, E/M 30-74 MINUTES: ICD-10-PCS | Mod: ,,, | Performed by: PSYCHIATRY & NEUROLOGY

## 2023-06-09 PROCEDURE — 97162 PT EVAL MOD COMPLEX 30 MIN: CPT

## 2023-06-09 PROCEDURE — 97535 SELF CARE MNGMENT TRAINING: CPT

## 2023-06-09 PROCEDURE — 94761 N-INVAS EAR/PLS OXIMETRY MLT: CPT

## 2023-06-09 PROCEDURE — 81001 URINALYSIS AUTO W/SCOPE: CPT | Performed by: EMERGENCY MEDICINE

## 2023-06-09 PROCEDURE — 87077 CULTURE AEROBIC IDENTIFY: CPT | Performed by: EMERGENCY MEDICINE

## 2023-06-09 PROCEDURE — 25000242 PHARM REV CODE 250 ALT 637 W/ HCPCS: Performed by: NURSE PRACTITIONER

## 2023-06-09 PROCEDURE — 99291 CRITICAL CARE FIRST HOUR: CPT | Mod: ,,, | Performed by: PSYCHIATRY & NEUROLOGY

## 2023-06-09 PROCEDURE — 27000221 HC OXYGEN, UP TO 24 HOURS

## 2023-06-09 PROCEDURE — 63600175 PHARM REV CODE 636 W HCPCS: Performed by: PSYCHIATRY & NEUROLOGY

## 2023-06-09 PROCEDURE — 85025 COMPLETE CBC W/AUTO DIFF WBC: CPT | Performed by: EMERGENCY MEDICINE

## 2023-06-09 PROCEDURE — 80076 HEPATIC FUNCTION PANEL: CPT | Performed by: NURSE PRACTITIONER

## 2023-06-09 PROCEDURE — 87086 URINE CULTURE/COLONY COUNT: CPT | Performed by: EMERGENCY MEDICINE

## 2023-06-09 PROCEDURE — 92610 EVALUATE SWALLOWING FUNCTION: CPT

## 2023-06-09 PROCEDURE — 80053 COMPREHEN METABOLIC PANEL: CPT | Mod: 91 | Performed by: NURSE PRACTITIONER

## 2023-06-09 PROCEDURE — 63600175 PHARM REV CODE 636 W HCPCS: Performed by: NURSE PRACTITIONER

## 2023-06-09 PROCEDURE — 87186 SC STD MICRODIL/AGAR DIL: CPT | Performed by: EMERGENCY MEDICINE

## 2023-06-09 PROCEDURE — 84100 ASSAY OF PHOSPHORUS: CPT | Performed by: EMERGENCY MEDICINE

## 2023-06-09 PROCEDURE — 97110 THERAPEUTIC EXERCISES: CPT

## 2023-06-09 PROCEDURE — 87088 URINE BACTERIA CULTURE: CPT | Performed by: EMERGENCY MEDICINE

## 2023-06-09 PROCEDURE — 51701 INSERT BLADDER CATHETER: CPT

## 2023-06-09 PROCEDURE — 97165 OT EVAL LOW COMPLEX 30 MIN: CPT

## 2023-06-09 PROCEDURE — 36415 COLL VENOUS BLD VENIPUNCTURE: CPT | Performed by: NURSE PRACTITIONER

## 2023-06-09 PROCEDURE — 80202 ASSAY OF VANCOMYCIN: CPT | Performed by: PSYCHIATRY & NEUROLOGY

## 2023-06-09 PROCEDURE — 84484 ASSAY OF TROPONIN QUANT: CPT | Performed by: EMERGENCY MEDICINE

## 2023-06-09 PROCEDURE — 51798 US URINE CAPACITY MEASURE: CPT

## 2023-06-09 PROCEDURE — 85610 PROTHROMBIN TIME: CPT | Performed by: EMERGENCY MEDICINE

## 2023-06-09 PROCEDURE — 83735 ASSAY OF MAGNESIUM: CPT | Performed by: EMERGENCY MEDICINE

## 2023-06-09 PROCEDURE — 25000003 PHARM REV CODE 250: Performed by: NURSE PRACTITIONER

## 2023-06-09 PROCEDURE — 99232 PR SUBSEQUENT HOSPITAL CARE,LEVL II: ICD-10-PCS | Mod: GC,,, | Performed by: INTERNAL MEDICINE

## 2023-06-09 PROCEDURE — 82330 ASSAY OF CALCIUM: CPT | Performed by: PSYCHIATRY & NEUROLOGY

## 2023-06-09 PROCEDURE — 84484 ASSAY OF TROPONIN QUANT: CPT | Mod: 91 | Performed by: STUDENT IN AN ORGANIZED HEALTH CARE EDUCATION/TRAINING PROGRAM

## 2023-06-09 PROCEDURE — 82140 ASSAY OF AMMONIA: CPT | Performed by: NURSE PRACTITIONER

## 2023-06-09 PROCEDURE — 20000000 HC ICU ROOM

## 2023-06-09 PROCEDURE — 80053 COMPREHEN METABOLIC PANEL: CPT | Performed by: EMERGENCY MEDICINE

## 2023-06-09 PROCEDURE — 25000003 PHARM REV CODE 250: Performed by: PSYCHIATRY & NEUROLOGY

## 2023-06-09 PROCEDURE — 99232 SBSQ HOSP IP/OBS MODERATE 35: CPT | Mod: GC,,, | Performed by: INTERNAL MEDICINE

## 2023-06-09 RX ORDER — LANOLIN ALCOHOL/MO/W.PET/CERES
800 CREAM (GRAM) TOPICAL
Status: DISCONTINUED | OUTPATIENT
Start: 2023-06-09 | End: 2023-06-15 | Stop reason: HOSPADM

## 2023-06-09 RX ORDER — ONDANSETRON 2 MG/ML
4 INJECTION INTRAMUSCULAR; INTRAVENOUS EVERY 6 HOURS PRN
Status: DISCONTINUED | OUTPATIENT
Start: 2023-06-09 | End: 2023-06-15 | Stop reason: HOSPADM

## 2023-06-09 RX ORDER — SODIUM,POTASSIUM PHOSPHATES 280-250MG
2 POWDER IN PACKET (EA) ORAL
Status: DISCONTINUED | OUTPATIENT
Start: 2023-06-09 | End: 2023-06-15 | Stop reason: HOSPADM

## 2023-06-09 RX ORDER — HEPARIN SODIUM 5000 [USP'U]/ML
5000 INJECTION, SOLUTION INTRAVENOUS; SUBCUTANEOUS EVERY 8 HOURS
Status: CANCELLED | OUTPATIENT
Start: 2023-06-09

## 2023-06-09 RX ORDER — OXYCODONE HCL 5 MG/5 ML
2.5 SOLUTION, ORAL ORAL EVERY 4 HOURS PRN
Status: DISCONTINUED | OUTPATIENT
Start: 2023-06-09 | End: 2023-06-15 | Stop reason: HOSPADM

## 2023-06-09 RX ORDER — HYDROXYZINE HYDROCHLORIDE 25 MG/1
25 TABLET, FILM COATED ORAL 3 TIMES DAILY PRN
Status: DISCONTINUED | OUTPATIENT
Start: 2023-06-09 | End: 2023-06-12

## 2023-06-09 RX ORDER — SILODOSIN 4 MG/1
4 CAPSULE ORAL DAILY
Status: DISCONTINUED | OUTPATIENT
Start: 2023-06-10 | End: 2023-06-15 | Stop reason: HOSPADM

## 2023-06-09 RX ORDER — SODIUM CHLORIDE 9 MG/ML
INJECTION, SOLUTION INTRAVENOUS CONTINUOUS
Status: DISCONTINUED | OUTPATIENT
Start: 2023-06-09 | End: 2023-06-10

## 2023-06-09 RX ADMIN — LACTULOSE 15 G: 20 SOLUTION ORAL at 12:06

## 2023-06-09 RX ADMIN — HYDROXYZINE HYDROCHLORIDE 25 MG: 25 TABLET, FILM COATED ORAL at 12:06

## 2023-06-09 RX ADMIN — CEFTRIAXONE 1 G: 1 INJECTION, POWDER, FOR SOLUTION INTRAMUSCULAR; INTRAVENOUS at 02:06

## 2023-06-09 RX ADMIN — SODIUM CHLORIDE 250 ML: 0.9 INJECTION, SOLUTION INTRAVENOUS at 12:06

## 2023-06-09 RX ADMIN — LACTULOSE 15 G: 20 SOLUTION ORAL at 05:06

## 2023-06-09 RX ADMIN — LACTULOSE 15 G: 20 SOLUTION ORAL at 06:06

## 2023-06-09 RX ADMIN — MAGNESIUM OXIDE TAB 400 MG (241.3 MG ELEMENTAL MG) 800 MG: 400 (241.3 MG) TAB at 12:06

## 2023-06-09 RX ADMIN — SODIUM CHLORIDE: 9 INJECTION, SOLUTION INTRAVENOUS at 12:06

## 2023-06-09 RX ADMIN — VANCOMYCIN HYDROCHLORIDE 500 MG: 500 INJECTION, POWDER, LYOPHILIZED, FOR SOLUTION INTRAVENOUS at 06:06

## 2023-06-09 RX ADMIN — OXYCODONE HYDROCHLORIDE 2.5 MG: 5 SOLUTION ORAL at 12:06

## 2023-06-09 RX ADMIN — POTASSIUM & SODIUM PHOSPHATES POWDER PACK 280-160-250 MG 2 PACKET: 280-160-250 PACK at 12:06

## 2023-06-09 RX ADMIN — SODIUM CHLORIDE, SODIUM LACTATE, POTASSIUM CHLORIDE, AND CALCIUM CHLORIDE 1000 ML: .6; .31; .03; .02 INJECTION, SOLUTION INTRAVENOUS at 05:06

## 2023-06-09 RX ADMIN — Medication 800 MG: at 12:06

## 2023-06-09 NOTE — PLAN OF CARE
[facility] Care Plan    POC reviewed with Tona IBRAHIMA Andrade and family at 0300. Pt verbalized understanding. Questions and concerns addressed. No acute events overnight. Pt progressing toward goals. Will continue to monitor. See below and flowsheets for full assessment and VS info.     Pt neuro exam remains unchanged  BP WDL  NS @ 75ml/hr  Straight cath x1  CT head complete               Is this a stroke patient? no    Neuro:  [place] Coma Scale  Best Eye Response: 4-->(E4) spontaneous  Best Motor Response: 4-->(M4) withdraws from pain  Best Verbal Response: 1-->(V1) none  [place] Coma Scale Score: 9  Assessment Qualifiers: patient not sedated/intubated, no eye obstruction present  Pupil PERRLA: yes     24hr Temp:  [97.5 °F (36.4 °C)-99.8 °F (37.7 °C)]     CV:   Rhythm: normal sinus rhythm  BP goals:   SBP < 160  MAP > 65    Resp:           Plan: N/A    GI/:     Diet/Nutrition Received: NPO  Last Bowel Movement: 06/04/23  Voiding Characteristics: unable to void    Intake/Output Summary (Last 24 hours) at 6/9/2023 0626  Last data filed at 6/9/2023 0531  Gross per 24 hour   Intake 950 ml   Output 150 ml   Net 800 ml          Labs/Accuchecks:  Recent Labs   Lab 06/09/23 0147   WBC 4.73   RBC 3.02*   HGB 8.2*   HCT 26.2*   *      Recent Labs   Lab 06/09/23 0147     140   K 3.5  3.5   CO2 17*  17*   *  114*   BUN 24*  24*   CREATININE 1.9*  1.9*   ALKPHOS 97  97  97   ALT 2,626*  2,626*  2,626*   AST 6,047*  6,047*  6,047*   BILITOT 0.4  0.4  0.4      Recent Labs   Lab 06/09/23 0147   INR 1.2   APTT 21.9      Recent Labs   Lab 06/08/23 2005 06/09/23 0147   *  --    TROPONINI 2.900* 1.856*  1.851*       Electrolytes: N/A - electrolytes WDL  Accuchecks: Q6H    Gtts:   sodium chloride 0.9% 75 mL/hr at 06/09/23 0051       LDA/Wounds:  Lines/Drains/Airways       Peripheral Intravenous Line  Duration                  Peripheral IV - Single Lumen 20 G Posterior;Right Hand -- days          Peripheral IV - Single Lumen 06/08/23 0854 18 G Right Antecubital <1 day         Peripheral IV - Single Lumen 06/08/23 1317 20 G Left Antecubital <1 day                  Wounds: Yes  Wound care consulted: No         BP WDL  Problem: Bowel Elimination Impaired (Stroke, Hemorrhagic)  Goal: Effective Bowel Elimination  Outcome: Ongoing, Progressing     Problem: Cognitive Impairment (Stroke, Hemorrhagic)  Goal: Optimal Cognitive Function  Outcome: Ongoing, Progressing     Problem: Swallowing Impairment (Stroke, Hemorrhagic)  Goal: Optimal Eating and Swallowing Without Aspiration  Outcome: Ongoing, Progressing  Intervention: Optimize Eating and Swallowing  Flowsheets (Taken 6/9/2023 0620)  Aspiration Precautions:   awake/alert before oral intake   upright posture maintained  Feeding/Eating Techniques: rest periods provided     Problem: Urinary Elimination Impaired (Stroke, Hemorrhagic)  Goal: Effective Urinary Elimination  Outcome: Ongoing, Progressing     Problem: Fluid and Electrolyte Imbalance (Acute Kidney Injury/Impairment)  Goal: Fluid and Electrolyte Balance  Outcome: Ongoing, Progressing  Intervention: Monitor and Manage Fluid and Electrolyte Balance  Flowsheets (Taken 6/9/2023 0620)  Fluid/Electrolyte Management:   intravenous fluids adjusted   fluids provided     Problem: Renal Function Impairment (Acute Kidney Injury/Impairment)  Goal: Effective Renal Function  Outcome: Ongoing, Progressing

## 2023-06-09 NOTE — ASSESSMENT & PLAN NOTE
Ms Pineda is a 68 fairly healthy lady with no significant risk factors for cardiovascular disease.  She had a recent hospitalization for csf leak s/p craniotomy and repair on 6/6 who presents 1 day after discharge with encephalopathy  acute liver injury, KAROLINA and elevated troponin.  Patient was found unresponsive by , they attribute this to her pain management medications, she received narcan in 2 occassions and mentation improved. She was taken to [facility] were she placed briefly on vasopressors and later transported to [facility] . Here patient found to have  She was found to have elevated troponin  which on 6/9. EKG with no st-t changes. Denied chest paint prior presenting to the ED as well as with daily exertion she (exercises 4-5 times a week  ).Elevated troponin likely 2/2 type II NSTEMI in light of evidence of markedly high transaminitis, elevated CK and lactate which have improved since admission supporting the suspicion of hypotensive shock.  Echo : EF preserved ( no concern for cardiogenic shock) although  it did show segmental left ventricular wall motion abnormalities possibly 2/2 to myocardium stunning.      Recommendations:  - Echocardiogram in 6-8 weeks    - FU with cardiology after Echo  -FU with PCP in 1-2 weeks

## 2023-06-09 NOTE — HPI
Patient is a 68 y.o female with h/o arthritis, ischemic colitis, recent hospitalization for temporal bone cs leak s/p L temporal craniotomy and repair of defect on june 5th for whom cardiology has been consulted on for elevated troponin.   Underwent left temporal craniotomy and repair of defect on June 5th.  Was discharged on June 7th with gabapentin, Keppra, Percocet, and tamsulosin.   noted patient was weak, drowsy and could not stand on her way on the way home from the hospital and became somnolent during the drive and required neighbors to help carry her inside. She was somnolent the entire time she was at home, per . He gave her gabapentin and one tablet of Percocet that at 1800 and another dose of oxycodone at 2:00 a.m. Denies any additional tylenol or OTC meds. At 6:00 a.m. he found her unresponsive, diaphoretic and breathing slowly. EMS was called and O2 sats were 50% on arrival.  Received Narcan that caused agitation.  Somnolent again on arrival in ED at [facility]. Per chart was following basic commands and squeezing fingers when she was awake.  Was initially hypotensive requiring Levophed up to at least 0.4.  Responded to multiple doses of Narcan and became agitated. Initial INR 1.3, AST 3569, ALT 2074, ammonia 11, acetaminophen level undetectable, lactate 3.4.       Upon arrival she is awake but drowsy, oriented to person, place, month, not date or situation. Does not recall events at home. Off levophed prior to transport.   on arrival vitals were stable , MAP 92, saturating 97% on 3L NC.   Troponin was obtained on arrival and was 1.8 which later uptrended to 2.9. EKG with no st-t changes. pt denies any cp or chest discomfort.  bedside TTE with preserved EF and no wall motion abnormality.   other labs with cpk 849, significantly elevated LFTs AST/ALT 8931/3275 ; Tbili 0.4. cr 1.9 from 0.7 a day ago.    last formal TTE in 2018 with EF 55-60%

## 2023-06-09 NOTE — PLAN OF CARE
Problem: Occupational Therapy  Goal: Occupational Therapy Goal  Description: Goals to be met by: 6/23/23     Patient will increase functional independence with ADLs by performing:    UE Dressing with Supervision.  LE Dressing with Supervision.  Grooming while standing at sink with Supervision.  Toileting from toilet with Supervision for hygiene and clothing management.   Toilet transfer to toilet with Supervision.    Outcome: Ongoing, Progressing

## 2023-06-09 NOTE — PT/OT/SLP EVAL
Physical Therapy Co-Evaluation and Co-Treatment with OT    Patient Name:  Tona Zafar   MRN:  [id number]    Recent Surgery: * No surgery found *      Patient required co-tx with OT secondary to need for multiple set of skilled hands to provide safest therapy and best outcomes.      Recommendations:     Discharge Recommendations:  home health PT   Discharge Equipment Recommendations: none   Barriers to discharge: None    Highest Level of Mobility: Stand step t/f to bedside chair   Assistance Required: CGA w/ HHAx2    Assessment:     Tona Zafar is a 68 y.o. female admitted with a medical diagnosis of Acute encephalopathy. She presents with the following impairments/functional limitations:  weakness, gait instability, impaired endurance, impaired balance, decreased lower extremity function, impaired self care skills, impaired functional mobility, impaired cognition, pain, decreased safety awareness    Pt met with HOB elevated and agreeable to PT session. Pt reports her PLOF is (I) with functional mobility and ADLs using no DME. Pt currently requires CGA for stand step t/f to chair. She is very pleasant and follows all instructions appropriately. Pt is tremulous on exam, BP stable.     Pt would benefit from continued skilled acute PT 3x/wk to address above listed functional deficits, provide patient/caregiver education, reduce fall risk, and maximize (I) and safety with functional mobility. After hospital discharge, pt would benefit from HHPT to maximize rehab potential.    Rehab Prognosis: Good; patient would benefit from acute skilled PT services to address these deficits and reach maximum level of function.      Plan:     During this hospitalization, patient to be seen 3 x/week to address the identified rehab impairments via gait training, therapeutic activities, therapeutic exercises, neuromuscular re-education and progress toward the following goals:    Plan of Care Expires:  07/09/23    This plan of care  "has been discussed with the patient/caregiver, who was included in its development and is in agreement with the identified goals and treatment plan.     Subjective     Communicated with RN prior to session.  Patient agreeable to participate.     Chief Complaint: Acute encephalopathy   Patient/Family Comments/goals: None stated    Pain/Comfort:  Pain Rating 1: 5/10  Location - Orientation 1: generalized  Location 1: head (headache)  Pain Addressed 1: Reposition, Distraction  Pain Rating Post-Intervention 1: 5/10    Patients cultural, spiritual, Sabianist conflicts given the current situation: no    Patient's living environment is as follows:  Living Environment: Pt lives with spouse in [facility] with 0 [place].   Prior Level of Function: independent with mobility and ADLs  DME used: none  DME owned (not currently used): none  Upon discharge, patient will have assistance from: Spouse    Objective:     Patient found HOB elevated with bed alarm, telemetry, pulse ox (continuous), peripheral IV, PureWick, oxygen, SCD  upon PT entry to room.    General Precautions: Standard, aphasia, fall   Orthopedic Precautions:N/A   Braces: N/A   /62   Pulse 72   Temp 98.5 °F (36.9 °C) (Oral)   Resp (!) 31   Ht 5' 10" (1.778 m)   Wt 86.6 kg (190 lb 14.7 oz)   SpO2 97%   BMI 27.39 kg/m²   Oxygen Device: nasal cannula      Exams:    Cognition:  Patient is oriented to Person, Place, Situation  Follows one-step commands  Insight to deficits/safety awareness: impaired    Edema: None present    Postural examination/scapula alignment: Rounded shoulder    Lower Extremity Range of Motion:  Right Lower Extremity: WNL  Left Lower Extremity: WNL    Lower Extremity Strength    Right LE  Left LE    Hip Flexion: 4+/5 Hip Flexion: 4+/5   Knee Extension: 5/5 Knee Extension: 5/5   Knee Flexion: 5/5 Knee Flexion: 5/5   Ankle Dorsiflexion:  5/5 Ankle Dorsiflexion: 5/5   Ankle Plantarflexion: 5/5 Ankle Plantarflexion: 5/5        Sensation:   Light touch " sensation: Intact BLEs    Functional Mobility:    Bed Mobility:  Supine to Sit: Minimal Assistance on R side of bed  Scooting anteriorly to EOB to plant feet on floor: Minimal Assistance    Transfers:   Sit to Stand Transfer: Minimal Assistance  from EOB with no AD   Bed to Chair: Contact Guard Assistance with no AD              Gait:  Patient received gait training 4 feet with Contact Guard Assistance and  no AD to bedside chair  Gait Assessment: narrow base of support and decreased mateo  Gait Pattern Observed: Step-through reciprocal gait   Comments: All lines remained intact throughout ambulation trial, gait belt utilized. No overt LOB    Balance:  Static Sit:   Stand-By Assist at EOB   Normal: Patient able to maintain steady balance without handhold support.  Static Stand:   Contact-Guard Assist with no AD  Dynamic Stand:  Contact-Guard Assist with no AD      Therapeutic Activities/Exercises     Patient assisted with functional mobility as noted above  Discussed at length benefits of PT as well as d/c recommendations. Pt agreeable  Patient educated on the importance of early mobility, OOB to prevent functional decline during hospital stay  Patient was instructed to utilize staff assistance for mobility/transfers.  Patient is appropriate to transfer with CGA-min(A) and RN/PCT assist  Patient educated on PT POC and role of PT in acute care  White board updated to include patient's safest level of mobility with staff assistance, RN also updated    AM-PAC 6 CLICK MOBILITY  Turning over in bed (including adjusting bedclothes, sheets and blankets)?: 4  Sitting down on and standing up from a chair with arms (e.g., wheelchair, bedside commode, etc.): 3  Moving from lying on back to sitting on the side of the bed?: 3  Moving to and from a bed to a chair (including a wheelchair)?: 3  Need to walk in hospital room?: 3  Climbing 3-5 steps with a railing?: 3  Basic Mobility Total Score: 19      Patient left up in chair  with all lines intact, call button in reach, chair alarm on, and rn notified.      History/Goals:     PAST MEDICAL HISTORY:  Past Medical History:   Diagnosis Date    Abnormal bone density screening     Colon polyps     Degenerative joint disease 2007    Diverticulosis     Gastroparesis 2018    History of chicken pox     History of depression 2007    Ischemic colitis     Dr. Drew    Lumbar disc disease        Past Surgical History:   Procedure Laterality Date    APPENDECTOMY      AUGMENTATION OF BREAST      BREAST SURGERY Bilateral 2005    Implants    CHOLECYSTECTOMY  2012    COLONOSCOPY  2020    Dr. Drew, in procedures; repeat in 6 months to check for healing of ischemic colitis    COLONOSCOPY  2018    Dr. Drew, in procedures    DENTAL SURGERY      DILATION AND CURETTAGE OF UTERUS      HIP SURGERY Right 2007    LUMBAR PUNCTURE N/A 2023    Procedure: LUMBAR PUNCTURE;  Surgeon: Rangel Perez DO;  [facility];  Service: Neurosurgery;  Laterality: N/A;    REPAIR, CSF LEAK, CRANIUM, MIDDLE FOSSA APPROACH Left 2023    Procedure: REPAIR, CSF LEAK, CRANIUM, MIDDLE FOSSA APPROACH;  Surgeon: Gasper Griffith MD;  Location: [facility];  Service: ENT;  Laterality: Left;    REPAIR, CSF LEAK, CRANIUM, MIDDLE FOSSA APPROACH Left 2023    Procedure: REPAIR, CSF LEAK, CRANIUM, MIDDLE FOSSA APPROACH;  Surgeon: Rangel Perez DO;  [facility];  Service: Neurosurgery;  Laterality: Left;    TONSILLECTOMY      UPPER GASTROINTESTINAL ENDOSCOPY  2018    Dr. Drew, in media    WRIST SURGERY Left 2014       GOALS:   Multidisciplinary Problems       Physical Therapy Goals          Problem: Physical Therapy    Goal Priority Disciplines Outcome Goal Variances Interventions   Physical Therapy Goal     PT, PT/OT Ongoing, Progressing     Description: Goals to be met by: 23     Patient will increase functional independence with mobility by performin.  Supine to sit with Modified Siren  2. Sit to supine with Modified Siren  3. Sit to stand transfer with Modified Siren  4. Bed to chair transfer with Modified Siren using LRAD if needed  5. Gait  x 150 feet with Supervision using LRAD if needed.   6. Lower extremity exercise program x10 reps per handout, with supervision                         Time Tracking:     PT Received On: 06/09/23  PT Start Time: 1015     PT Stop Time: 1039  PT Total Time (min): 24 min     Billable Minutes: Evaluation 12 and Therapeutic Exercise 12      Susan Kasper, PT  06/09/2023  Pager# [phone]

## 2023-06-09 NOTE — ASSESSMENT & PLAN NOTE
inittial wakefulness improved with narcan at OSH, gtt was started and continued to tranfers to [facility]   Stopped shortly after arrival. Follow exam closely

## 2023-06-09 NOTE — CONSULTS
[facility]  Hepatology  Consult Note    Patient Name: Tona Zafar  MRN: [id number]  Admission Date: 6/8/2023  Hospital Length of Stay: 0 days  Code Status: Prior   Attending Provider: Marco Mathis DO   Consulting Provider: Fernando Gonzalez MD  Primary Care Physician: Hubert Barrera MD  Principal Problem:<principal problem not specified>    Inpatient consult to Hepatology  Consult performed by: Fernando Gonzalez MD  Consult ordered by: Boston Malik NP      Subjective:     HPI: Tona Zafar is a 68 y.o. female with history of arthritis, ischemic colitis, gastroparesis, EoE who presents for AMS to [facility], transferred to [facility]. Hepatology consulted for acute liver injury. Was recently admitted for chronic left temporal bone CSF leak.  Underwent left temporal craniotomy and repair of defect on June 5th.  Was discharged on June 7th with gabapentin, Keppra, Percocet, and tamsulosin.   noted patient was weak, drowsy and could not stand on her way on the way home from the hospital and became somnolent during the drive and required neighbors to help carry her inside. She was somnolent the entire time she was at home, per . He gave her gabapentin and one tablet of Percocet that at 1800 and another dose of oxycodone at 2:00 a.m. Denies any additional tylenol or OTC meds. At 6:00 a.m. he found her unresponsive, diaphoretic and breathing slowly. EMS was called and O2 sats were 50% on arrival.  Received Narcan that caused agitation.  Somnolent again on arrival in ED at [facility]. Per chart was following basic commands and squeezing fingers when she was awake.  Was initially hypotensive requiring Levophed up to at least 0.4.  Responded to multiple doses of Narcan and became agitated. Initial INR 1.3, AST 3569, ALT 2074, ammonia 11, acetaminophen level undetectable, lactate 3.4.      Upon arrival she is awake but drowsy, oriented to person, place, month, not date or situation.  Does not recall events at home. Complains only of chronic epigastric pain, unchanged lately. Off levophed prior to transport.    No prior history of liver disease.  reports she did drink heavily 3-4 years ago but cut down significantly after formal inpatient rehab program at that time and now drinks 2 glasses of wine per week.     PSH: cholecystectomy, L craniotomy with repair of CSF leak    Objective:     There were no vitals filed for this visit.      Constitutional:  drowsy but awake, NAD  HENT: Head: Normal, normocephalic  Eyes: conjunctiva clear and sclera nonicteric  GI: soft, non-tender, without masses or organomegaly  Musculoskeletal: no muscular tenderness noted  Skin: normal color  Neurological: alert, oriented to person, place, month, not date or situation. Asterixis present.      Significant Labs:  Reviewed the following pertinent laboratory tests: LFTs, INR, ammonia  AST 3500 > 8900, ALT 2000 > 3200, uptrending, acute elevation  INR 1.4 > 1.2  Tbili 0.5 > 0.4  Synthetic function intact, improving   > 109  Ammonia 11 > 90    Significant Imaging:  Imaging reviewed: CT head wo contrast. Interpretation:    1. Postsurgical changes of the left lateral calvarium with decreasing pneumocephalus.  No evidence for acute intracranial abnormality.  2. Complete opacification of the left mastoid air cells as well as the left middle ear.    Assessment/Plan:     Tona Zafar is a 68 y.o. female with history of chronic CSF leak s/p craniotomy and repair on 6/6 who presents with encephalopathy, acute liver injury, KAROLINA and elevated troponin. Initially somnolent, hypoxemic and requiring vasopressors, transferred from [facility]. Synthetic function intact, mental status now improved but still with some encephalopathy. Responded to narcan, opiates possibly contributing. Low suspicion for tylenol base don history and negative serum levels. Negative viral hepatitis panel. Most concerned for ischemia especially  in setting of acute multiorgan insult with KAROLINA and troponin elevation. With intact synthetic function, not currently meeting criteria of acute liver failure but will monitor closely.    Problem List:  Acute liver injury  Encephalopathy  Shock, resolved  KAROLINA  Elevated troponin    Recommendations:  - Suspect ischemic liver injury  - Ordered serologic workup  - Obtain formal TTE  - Obtain liver US with dopplers  - Consider EEG to rule out seizure activity  - Would avoid Keppra use if possible, unlikely but possible cause of DILI  - Consider cardiology consult for uptrending troponin  - Trend LFTs, INR q6h  - Infectious workup pending: Bcx, Ucx. Low threshold for empiric abx if any hemodynamic instability  - Neuro checks overnight, monitor glucose q2h    Thank you for involving us in the care of Tona Zafar. Discussed with attending Dr. Soto. Please call with any additional questions, concerns or changes in the patient's clinical status. We will continue to follow.     Fernando Gonzalez MD  Gastroenterology Fellow PGY IV  [facility]

## 2023-06-09 NOTE — HOSPITAL COURSE
06/09/2023 improving mentation and multiorgan failure  6/10/23: trend live enzymes  6/11/23: ok to step down to HM

## 2023-06-09 NOTE — ASSESSMENT & PLAN NOTE
S/p tegmentum defect repair on 6/6 wit NSGY  Step down and discharge on 6/7 by NSGY    NSGY consulted after transfer to [facility]  Incision left temple is C/D/I, and no fresh draiange noted to the left ear, some dried flecks of blood.

## 2023-06-09 NOTE — PLAN OF CARE
Recommendations     1. If able to tolerate PO intake, ADAT to regular- texture per SLP. Add renal restrictions if needed.      2. If alternative route of nutrition warranted, initiate TF regimen of Novasource Renal @ goal rate of 40 mL/hr- provides 1920 kcals, 87 g pro, and 688 mL fluid.      3. RD following.     Goals: Will meet % EEN/EPN by next RD f/u.  Nutrition Goal Status: new  Communication of RD Recs:  (POC)    Heaven Camacho RDN,LDN

## 2023-06-09 NOTE — PROGRESS NOTES
Pharmacokinetic Initial Assessment: IV Vancomycin    Assessment/Plan:    - Resuming vancomycin for Enterococcus spp. Growing in urine cx  - Received 1750 mg x 1 yesterday (6/8) at 14:30  - Random level 22 hours later is therapeutic at 16.1 mcg/mL  - Will dose by level in setting of KAROLINA, SCr worsening  - Administer vanc 500 mg x 1  - Draw random level tomorrow 6/10 at 17:00 (~24 hour level)    Pharmacy will continue to follow and monitor vancomycin.    Please contact pharmacy at extension [phone] with any questions regarding this assessment.     Thank you for the consult,   Mary Landaverde, PharmD, [facility]  Neurocritical Care Pharmacist  [phone]         Patient brief summary:  Tona Zafar is a 68 y.o. female initiated on antimicrobial therapy with IV Vancomycin for treatment of suspected urinary tract infection    Drug Allergies:   Review of patient's allergies indicates:  No Known Allergies    Actual Body Weight:   86.6 kg    Renal Function:   Estimated Creatinine Clearance: 20.5 mL/min (A) (based on SCr of 3.1 mg/dL (H)).,     Dialysis Method (if applicable):  N/A    CBC (last 72 hours):  Recent Labs   Lab Result Units 06/07/23 0300 06/08/23 0846 06/08/23 2005 06/08/23 2330 06/09/23  0147   WBC K/uL 7.89 3.83* 5.70  --  4.73   Hemoglobin g/dL 9.0* 9.8* 9.7*  --  8.2*   Hemoglobin A1C %  --   --   --  5.1  --    Hematocrit % 30.4* 32.3* 31.2*  --  26.2*   Platelets K/uL 161 159 155  --  143*   Gran % % 73.6* 83.0* 67.9  --  67.5   Lymph % % 16.7* 11.2* 22.6  --  22.6   Mono % % 7.6 4.7 8.1  --  7.8   Eosinophil % % 1.3 0.0 0.2  --  0.2   Basophil % % 0.4 0.3 0.5  --  0.4   Differential Method  Automated Automated Automated  --  Automated       Metabolic Panel (last 72 hours):  Recent Labs   Lab Result Units 06/07/23 0300 06/08/23 0846 06/08/23 2005 06/08/23 2330 06/09/23  0059 06/09/23  0147 06/09/23  0538 06/09/23  1246   Sodium mmol/L 140 139 139  --   --  140  140 139 142   Potassium mmol/L 3.8 4.7 4.2  --    --  3.5  3.5 2.9* 3.9   Chloride mmol/L 111* 105 109  --   --  114*  114* 117* 111*   CO2 mmol/L 22* 28 21*  --   --  17*  17* 18* 23   Glucose mg/dL 93 143* 102  --   --  86  86 75 87   Glucose, UA   --  Negative  --   --  Negative  --   --   --    BUN mg/dL 9 17 23  --   --  24*  24* 24* 32*   Creatinine mg/dL 0.7 1.49* 1.9*  --   --  1.9*  1.9* 1.9* 3.1*   Creatinine, Urine mg/dL  --  155.1  --   --   --   --   --   --    Albumin g/dL 2.5* 3.2* 2.6* 2.5*  --  2.1*  2.1*  2.1* 1.8*  1.8* 2.7*  2.7*   Total Bilirubin mg/dL 0.5 0.5 0.4 0.4  --  0.4  0.4  0.4 0.3  0.3 0.5  0.5   Alkaline Phosphatase U/L 55 103 109 110  --  97  97  97 82  82 124  124   AST U/L 21 3,569* 8,931* 7,957*  --  6,047*  6,047*  6,047* 4,400*  4,400* 5,262*  5,262*   ALT U/L 10 2,074* 3,275* 3,213*  --  2,626*  2,626*  2,626* 2,233*  2,233* 3,298*  3,298*   Magnesium mg/dL 2.0 2.4  --   --   --  1.8  --   --    Phosphorus mg/dL 1.9*  --   --   --   --  2.2*  --   --        Drug levels (last 3 results):  Recent Labs   Lab Result Units 06/09/23  1246   Vancomycin, Random ug/mL 16.1       Microbiologic Results:  Microbiology Results (last 7 days)       Procedure Component Value Units Date/Time    Urine culture [[id number]] Collected: 06/09/23 0059    Order Status: No result Specimen: Urine Updated: 06/09/23 0649

## 2023-06-09 NOTE — H&P
[facility] - Neuro Critical Care  Neurocritical Care  History & Physical    Admit Date: 6/8/2023  Service Date: 06/08/2023  Length of Stay: 1    Subjective:     Chief Complaint: Acute encephalopathy    History of Present Illness: 68 y.o. F with hx of arthritis, diverticulosis, ischemic colitis, gastroparesis, tegmen defect s/p L temporal craniotomy and defect repair with cement and intradural dura matrix inlay at the site of encephalocele on June 5, who presented to [facility] ED for AMS. Patient was discharge from the hospital 6/7 s/p craniotomy and tegmen defect repair. She was discharged with prescription for gabapentin, keppra, percocet, she also has indwelling Cedillo catheter and tamsulosin for urinary retention post-op. She subsequently presented to [facility] Emergency Department on June 8 with altered mental status.  Patient's  noted that the patient was weak and could not stand on the way home from the hospital.  At 6:00 p.m. he gave her gabapentin and oxycodone.  He gave a subsequent dose at 2:00 a.m..  At 6:00 a.m. he found her unresponsive and sweating.  EMS was contacted.  O2 sats initially were in the low 50% range.  She received 4 mg of Narcan resulting in agitation.  On exam she was somnolent and would arouse to physical stimulus.  She would follow basic commands such as squeezing fingers when she was awake.  GCS was noted to be around 10.  She was initially hypotensive requiring Levophed, . In the emergency department she received LR and Narcan.  After the Narcan, she was disoriented but able to say her name and recognize family.  During her stay she can became somnolent.  She again responded to Narcan.  OSH ED is placed her on a Narcan infusion. She was found to have acute liver failure, KAROLINA, elevatedt troponin and UTI. On broad spectrum abx.       CT head had postsurgical changes of the left lateral calvarium with decreasing pneumocephalus.  No evidence for acute intracranial  abnormality.  Complete opacification of the left mastoid air cells as well as the left middle ear.       Transferred to [facility] [place] since recent discharge.       Past Medical History:   Diagnosis Date    Abnormal bone density screening     Colon polyps     Degenerative joint disease 02/05/2007    Diverticulosis     Gastroparesis 2018    History of chicken pox     History of depression 02/05/2007    Ischemic colitis 2020    Dr. Drew    Lumbar disc disease      Past Surgical History:   Procedure Laterality Date    APPENDECTOMY      AUGMENTATION OF BREAST      BREAST SURGERY Bilateral 2005    [employer]ants    CHOLECYSTECTOMY  06/2012    COLONOSCOPY  05/12/2020    Dr. Drew, in procedures; repeat in 6 months to check for healing of ischemic colitis    COLONOSCOPY  05/01/2018    Dr. Drew, in procedures    DENTAL SURGERY      DILATION AND CURETTAGE OF UTERUS      HIP SURGERY Right 2007    LUMBAR PUNCTURE N/A 6/5/2023    Procedure: LUMBAR PUNCTURE;  Surgeon: Rangel Perez DO;  [facility];  Service: Neurosurgery;  Laterality: N/A;    REPAIR, CSF LEAK, CRANIUM, MIDDLE FOSSA APPROACH Left 6/5/2023    Procedure: REPAIR, CSF LEAK, CRANIUM, MIDDLE FOSSA APPROACH;  Surgeon: Gasper Griffith MD;  Location: [facility] OR [facility] FLR;  Service: ENT;  Laterality: Left;    REPAIR, CSF LEAK, CRANIUM, MIDDLE FOSSA APPROACH Left 6/5/2023    Procedure: REPAIR, CSF LEAK, CRANIUM, MIDDLE FOSSA APPROACH;  Surgeon: Rangel Perez DO;  [facility];  Service: Neurosurgery;  Laterality: Left;    TONSILLECTOMY      UPPER GASTROINTESTINAL ENDOSCOPY  04/24/2018    Dr. Drew, in media    WRIST SURGERY Left 01/2014      Current Facility-Administered Medications on File Prior to Encounter   Medication Dose Route Frequency Provider Last Rate Last Admin    [COMPLETED] lactated ringers bolus 1,000 mL  1,000 mL Intravenous ED 1 Time Misty Mitchell MD   Stopped at 06/08/23 1100    [COMPLETED] meropenem-0.9% sodium chloride 1  g/50 mL IVPB  1 g Intravenous ED 1 Time Misty Mitchell MD   Stopped at 06/08/23 1426    [COMPLETED] naloxone 0.4 mg/mL injection 2 mg  2 mg Intravenous ED 1 Time Misty Mitchell MD   2 mg at 06/08/23 1007    [COMPLETED] naloxone 0.4 mg/mL injection 2 mg  2 mg Intravenous ED 1 Time Misty Mitchell MD   2 mg at 06/08/23 1223    [COMPLETED] naloxone 0.4 mg/mL injection 2 mg  2 mg Intravenous ED 1 Time Misty Mitchell MD   2 mg at 06/08/23 0915    [COMPLETED] vancomycin (VANCOCIN) 1,750 mg in dextrose 5 % (D5W) 500 mL IVPB  20 mg/kg Intravenous ED 1 Time Misty Mitchell MD   Stopped at 06/08/23 1635    [DISCONTINUED] naloxone (NARCAN) 0.4 mg/mL injection             [DISCONTINUED] naloxone (NARCAN) 4 mg in dextrose 5 % (D5W) 100 mL infusion  1.25 mg/hr Intravenous Continuous Misty Mitchell MD 31.3 mL/hr at 06/08/23 1309 1.25 mg/hr at 06/08/23 1309    [DISCONTINUED] NORepinephrine bitartrate-NaCl 8 mg/250 mL (32 mcg/mL) infusion  0-3 mcg/kg/min Intravenous Continuous Misty Mitchell MD   Stopped at 06/08/23 1326     Current Outpatient Medications on File Prior to Encounter   Medication Sig Dispense Refill    gabapentin (NEURONTIN) 300 MG capsule Take 1 capsule (300 mg total) by mouth 3 (three) times daily. 90 capsule 11    hydrOXYzine HCL (ATARAX) 25 MG tablet Take 1 tablet (25 mg total) by mouth 3 (three) times daily as needed for Anxiety. 42 tablet 0    levETIRAcetam (KEPPRA) 500 MG Tab Take 1 tablet (500 mg total) by mouth 2 (two) times daily. for 5 days 10 tablet 0    magnesium 200 mg Tab Take 200 mg by mouth once daily.      magnesium hydroxide 400 mg/5 ml (MILK OF MAGNESIA) 400 mg/5 mL Susp Take 30 mLs by mouth daily as needed.      microfibrillar collagen powder Apply 1 g topically as needed.      oxyCODONE-acetaminophen (PERCOCET)  mg per tablet Take 1 tablet by mouth every 6 (six) hours as needed for Pain. 40 tablet 0    tamsulosin (FLOMAX) 0.4 mg Cap Take 1 capsule (0.4 mg total) by mouth once daily.  30 capsule 11    tumeric-ging-olive-oreg-capryl 100 mg-150 mg- 50 mg-150 mg Cap Take by mouth.      TURMERIC ORAL Take 1 capsule by mouth once daily.      UNABLE TO FIND Take 1 capsule by mouth once daily. medication name: Beet root      UNABLE TO FIND Take 1 capsule by mouth once daily. medication name: Federica Root      UNABLE TO FIND Take 2 capsules by mouth once daily. medication name: Nitric Oxide      vitamin D3/vitamin K2, MK4, (K2 PLUS D3 ORAL) Take 1 capsule by mouth once daily.      zinc 50 mg Tab Take 50 mg by mouth once daily.        Allergies: Patient has no known allergies.    Family History   Problem Relation Age of Onset    Diabetes Mother     Throat cancer Father     Colon cancer Maternal Grandfather     Breast cancer Neg Hx     Ovarian cancer Neg Hx     Crohn's disease Neg Hx     Esophageal cancer Neg Hx     Ulcerative colitis Neg Hx     Stomach cancer Neg Hx     Celiac disease Neg Hx      Social History     Tobacco Use    Smoking status: Former     Types: Cigarettes    Smokeless tobacco: Never   Substance Use Topics    Alcohol use: Yes     Alcohol/week: 1.0 standard drink     Types: 1 Glasses of wine per week     Comment: occasional    Drug use: No     Review of Systems   Reason unable to perform ROS: limited due to exam.   Eyes:  Negative for visual disturbance.   Respiratory:  Negative for shortness of breath.    Cardiovascular:  Negative for chest pain and palpitations.   Gastrointestinal:  Negative for abdominal pain and vomiting.   Endocrine: Negative for polyuria.   Genitourinary:  Negative for dysuria.   Neurological:  Positive for headaches.   Objective:     Vitals:    Temp: 99.8 °F (37.7 °C)  Pulse: 87  Rhythm: normal sinus rhythm  BP: (!) 154/77  MAP (mmHg): 104  Resp: (!) 25  SpO2: (!) 93 %    Temp  Min: 97.5 °F (36.4 °C)  Max: 99.8 °F (37.7 °C)  Pulse  Min: 84  Max: 117  BP  Min: 88/67  Max: 162/77  MAP (mmHg)  Min: 59  Max: 144  Resp  Min: 14  Max: 32  ETCO2 (mmHg)   Min: 15 mmHg  Max: 33 mmHg  SpO2  Min: 92 %  Max: 100 %    No intake/output data recorded.            Physical Exam  Vitals and nursing note reviewed.   HENT:      Head:      Comments: Surgical incision c/d/I to left temple     Mouth/Throat:      Pharynx: Oropharynx is clear.   Cardiovascular:      Rate and Rhythm: Normal rate and regular rhythm.      Pulses: Normal pulses.      Heart sounds: Normal heart sounds.   Pulmonary:      Effort: Pulmonary effort is normal.      Breath sounds: Normal breath sounds.   Abdominal:      Palpations: Abdomen is soft.   Skin:     General: Skin is dry.      Capillary Refill: Capillary refill takes 2 to 3 seconds.   Neurological:      Comments: E4 V5 M 6  Awake, o x person and place (wax/wane)  Follows commands intermittently  Strength appears symmetrical upper and lower extremities  Asterixis upper extremities           Today I personally reviewed pertinent medications, lines/drains/airways, imaging, cardiology results, laboratory results, microbiology results,           Assessment/Plan:     Neuro  * Acute encephalopathy  Presented as transfer from [facility] after arrived encephalopathic there, initialy with unknown cause. She arrived hypotensive and hypoxic, requirng levo gtt. She responded to narcan and was started on a Narcan gtt. After [place] accepted to [facility] [place], she was found to be in acute liver failure  -CTH with initally post-op changes, discussed with NSGY. Repeat CTH ordered, Fall?  -Elevated ammonia/liver enzymes, hepatic encephalapathy?  -UTI, rocephin started  -considering concerns for global hypoxic event (liver, renal and cardiac injury), possible hypoxia stephanie injury  Admitted to [facility], hourly neuro checks  NSGY, Hepatology and cardiology consulted  SBP < 160  PT/OT        ENT  Tegmen defect of base of skull  S/p tegmentum defect repair on 6/6 wit NSGY  Step down and discharge on 6/7 by NSGY    NSGY consulted today after transfer to [facility] [place]  Incision left temple is C/D/I, and no  fresh draiange noted to the left ear, some dried flecks of blood.     Discussed possibly of ACS/Heparin gtt with NSGY when initially consulting Cardiology. From a NSGP perspective, starting AC/antipaltlets would greatly increase bleeding risk given recent intracranial surgery. It should be avoided if possible.     Cardiac/Vascular  NSTEMI (non-ST elevated myocardial infarction)  See elevated troponin    Elevated troponin  Elevated troponin at OSH with EKG T wave changes/BBB?  Repeated on arrival, with continued uptrend, EKG improved.  Cardiology consulted, thoughts are NSTEMI  With no need for ACS protocol/Heparin    Will trend Troponin till down trend      Renal/  Acute cystitis  UTI present on admission, Rocephin started     KAROLINA (acute kidney injury)  Presented to OSH with new KAROLINA, Crt 1.4, worsening to > 1.9 GFR 20's after transfer  Follow I/O, Follow UO  IVF started, bolus given on arrival and it appears none was given prior to transfer  Given worsening KAROLINA, Liver function and Cardiac enzymes, believe global hypoxic event    GI  Acute liver failure  Presented at OSH with [place] increase in AST/ALT over pst 24hours,   AST/ALT normal on 6/7 and acutely increased to ASt 3569, ALT 2074 in 24 hrs  AST/ALT continuing to climb, now AST > 8900, ALT > 3200  Bili is WNL  Ammonia 90's  Hepatology consulted  Liver US with doppler pending  Coags currently WNL  Will trend labs  -thoughts are global hypoxic event    Other  Overdose  inittial wakefulness improved with narcan at OSH, gtt was started and continued to [place] [facility] [place]   Stopped shortly after arrival. Follow exam closely     Increased ammonia level  Acute liver inury/failure  Hepatology following  Lactulose started        The patient is being Prophylaxed for:  Venous Thromboembolism with: Mechanical  Stress Ulcer with: Not Applicable   Ventilator Pneumonia with: not applicable    Activity Orders          Turn patient starting at 06/09 0000    Elevate HOB starting at  06/08 2225    Diet NPO: NPO starting at 06/08 2225        Full Code     Critical condition in that Patient has a condition that poses threat to life and bodily function: Liver failure, NSTEMI, KAROLINA, Acute encephalopathy, Opoid overdose      70 minutes of Critical care time was spent personally by me on the following activities: development of treatment plan with patient or surrogate and bedside caregivers, discussions with consultants, evaluation of patient's response to treatment, examination of patient, ordering and performing treatments and interventions, ordering and review of laboratory studies, ordering and review of radiographic studies, pulse oximetry, antibiotic titration if applicable, vasopressor titration if applicable, re-evaluation of patient's condition. This critical care time did not overlap with that of any other provider or involve time for any procedures. There is high probability for acute neurological change leading to clinical and possibly life-threatening deterioration requiring highest level of physician preparedness for urgent intervention.    Boston Malik, OSCAR  Neurocritical Care  [facility] - Neuro Critical Care

## 2023-06-09 NOTE — ASSESSMENT & PLAN NOTE
S/p tegmentum defect repair on 6/6 wit NSGY  Step down and discharge on 6/7 by NSGY    NSGY consulted today after transfer to [facility] [place]  Incision left temple is C/D/I, and no fresh draiange noted to the left ear, some dried flecks of blood.     Discussed possibly of ACS/Heparin gtt with NSGY when initially consulting Cardiology. From a NSGP perspective, starting AC/antipaltlets would greatly increase bleeding risk given recent intracranial surgery. It should be avoided if possible.

## 2023-06-09 NOTE — CONSULTS
[facility] - Neuro Critical Care  Adult Nutrition  Consult Note    SUMMARY     Recommendations    1. If able to tolerate PO intake, ADAT to regular- texture per SLP. Add renal restrictions if needed.     2. If alternative route of nutrition warranted, initiate TF regimen of Novasource Renal @ goal rate of 40 mL/hr- provides 1920 kcals, 87 g pro, and 688 mL fluid.     3. RD following.    Goals: Will meet % EEN/EPN by next RD f/u.  Nutrition Goal Status: new  Communication of RD Recs:  (POC)    Assessment and Plan    Nutrition Problem  Inadequate oral intake     Related to (etiology):   Inability to consume sufficient needs     Signs and Symptoms (as evidenced by):   NPO status      Interventions/Recommendations (treatment strategy):  Collaboration of nutrition care with other providers      Nutrition Diagnosis Status:   New    Reason for Assessment    Reason For Assessment: consult  Diagnosis:  (Acute encephalopathy)  Relevant Medical History: Acute liver failure, KAROLINA, NSTEMI  Interdisciplinary Rounds: did not attend  General Information Comments: RD consulted to assess dietary needs. Spoke with  at bedside. States that pt has not had anything to eat since Sunday. Noted pt was admitted June 5th for craniotomy and repair defect and was dc on June 7th. Pt re-admitted yesterday. States that usual appetite is good with 3 meals/day on general diet with no ONS. NPO awaiting SLP eval. No issues with n/v/d/c. Endorses no wt loss- UBW seems to fluctuate between 169-179# per chart review. No s/s of malnutrition, appears well-nourished. Pt at risk for malnutrition if lack of nutrition continues. LBM 6/4.  Nutrition Discharge Planning: Pending clinical course    Nutrition Risk Screen    Nutrition Risk Screen: no indicators present    Nutrition/Diet History    Patient Reported Diet/Restrictions/Preferences: general  Food Allergies: NKFA  Factors Affecting Nutritional Intake: NPO    Anthropometrics    Temp: 98.5 °F  "(36.9 °C)  Height Method: Stated  Height: 5' 10" (177.8 cm)  Height (inches): 70 in  Weight Method: Bed Scale  Weight: 86.6 kg (190 lb 14.7 oz)  Weight (lb): 190.92 lb  Ideal Body Weight (IBW), Female: 150 lb  % Ideal Body Weight, Female (lb): 127.28 %  BMI (Calculated): 27.4  BMI Grade: 25 - 29.9 - overweight    Lab/Procedures/Meds    Pertinent Labs Reviewed: reviewed  Pertinent Labs Comments: Albumin 1.8, Potassium 2.9, Ca 6.2, AST 4400, ALT 2233, Creatinine 1.9, Phos 2.2, GFR 28.2, BUN 24  Pertinent Medications Reviewed: reviewed  Pertinent Medications Comments: lactulose    Estimated/Assessed Needs    Weight Used For Calorie Calculations: 86.6 kg (190 lb 14.7 oz)  Energy Calorie Requirements (kcal): 1913 kcals  Energy Need Method: [place]-St Jeor (MSJ x 1.3 PAL)  Protein Requirements: 69-87 g (0.8-1.0 g/kg)  Weight Used For Protein Calculations: 86.6 kg (190 lb 14.7 oz)  Fluid Requirements (mL): 1 mL/kcal or fluid per MD  Estimated Fluid Requirement Method: RDA Method  RDA Method (mL): 1913    Nutrition Prescription Ordered    Current Diet Order: NPO    Evaluation of Received Nutrient/Fluid Intake    I/O: +800 mL since admit  % Intake of Estimated Energy Needs: 0%  % Meal Intake: NPO    Nutrition Risk    Level of Risk/Frequency of Follow-up:  (1 time/week)     Monitor and Evaluation    Food and Nutrient Intake: food and beverage intake, energy intake, enteral nutrition intake  Food and Nutrient Adminstration: diet order, enteral and parenteral nutrition administration  Knowledge/Beliefs/Attitudes: food and nutrition knowledge/skill, beliefs and attitudes  Physical Activity and Function: nutrition-related ADLs and IADLs  Anthropometric Measurements: weight change, body mass index, weight, height/length  Biochemical Data, Medical Tests and Procedures: lipid profile, inflammatory profile, glucose/endocrine profile, electrolyte and renal panel, gastrointestinal profile  Nutrition-Focused Physical Findings: overall " appearance     Nutrition Follow-Up    RD Follow-up?: Yes    Heaven Colon RDN,LDN

## 2023-06-09 NOTE — PROGRESS NOTES
[facility]  Cardiology  Progress Note    Patient Name: Tona Zafar  MRN: [id number]  Admission Date: 6/8/2023  Hospital Length of Stay: 1 days  Code Status: Full Code   Attending Physician: Linda Salter MD   Primary Care Physician: Hubert Barrera MD  Expected Discharge Date: 6/14/2023  Principal Problem:Acute encephalopathy    Subjective:     Hospital Course:   No notes on file    Interval History: This am per  patient is more alert. Troponins are trending downward, denies chest pain or  SOB during examination. Was slightly confused in AM  examination and back to baseline by 1600 hrs. Echo did show segmental left ventricular wall motion abnormalities.    Review of Systems   Constitutional: Negative for chills and fever.   HENT:  Negative for congestion.    Eyes:  Negative for redness.   Cardiovascular:  Negative for chest pain, irregular heartbeat and palpitations.   Respiratory:  Positive for shortness of breath (not present today but affirms SOB hours prior being found unresponsive). Negative for cough and wheezing.    Skin:  Negative for color change and nail changes.   Musculoskeletal:  Positive for arthritis.   Gastrointestinal:  Positive for diarrhea (most likely 2/2 to opiod reversal). Negative for abdominal pain, hematemesis, jaundice, nausea and vomiting.   Genitourinary:  Negative for dysuria.   Neurological:  Positive for difficulty with concentration.   Psychiatric/Behavioral:  Positive for altered mental status.    Objective:     Vital Signs (Most Recent):  Temp: 98.5 °F (36.9 °C) (06/09/23 0305)  Pulse: 72 (06/09/23 0701)  Resp: (!) 31 (06/09/23 0701)  BP: 128/62 (06/09/23 0605)  SpO2: 97 % (06/09/23 0701) Vital Signs (24h Range):  Temp:  [97.5 °F (36.4 °C)-99.8 °F (37.7 °C)] 98.5 °F (36.9 °C)  Pulse:  [] 72  Resp:  [14-35] 31  SpO2:  [91 %-97 %] 97 %  BP: ()/(37-90) 128/62     Weight: 86.6 kg (191 lb)  Body mass index is 27.41 kg/m².     SpO2: 97 %          Intake/Output Summary (Last 24 hours) at 6/9/2023 1110  Last data filed at 6/9/2023 0531  Gross per 24 hour   Intake 950 ml   Output 150 ml   Net 800 ml       Lines/Drains/Airways       Peripheral Intravenous Line  Duration                  Peripheral IV - Single Lumen 20 G Posterior;Right Hand -- days         Peripheral IV - Single Lumen 06/08/23 0854 18 G Right Antecubital 1 day         Peripheral IV - Single Lumen 06/08/23 1317 20 G Left Antecubital <1 day                       Physical Exam  Constitutional:       General: She is not in acute distress.     Appearance: She is not toxic-appearing.   HENT:      Head:        Comments: Craniotomy scar, clean no erythema present. Hematoma present on left cheek. Denies trauma states its residual from craniotomy.     Mouth/Throat:      Mouth: Mucous membranes are moist.   Eyes:      Conjunctiva/sclera: Conjunctivae normal.   Cardiovascular:      Rate and Rhythm: Normal rate and regular rhythm.      Heart sounds: No murmur heard.  Pulmonary:      Effort: Pulmonary effort is normal.      Breath sounds: Normal breath sounds.      Comments: On 3L NC  Abdominal:      General: Abdomen is flat.      Tenderness: There is abdominal tenderness.   Musculoskeletal:         General: No tenderness.      Right lower leg: No edema.      Left lower leg: No edema.   Skin:     General: Skin is warm.      Coloration: Skin is pale. Skin is not jaundiced.   Neurological:      Mental Status: She is oriented to person, place, and time.      Motor: Weakness (hand  3/5) present.   Psychiatric:         Mood and Affect: Mood normal.          Significant Labs:   Recent Lab Results  (Last 5 results in the past 24 hours)        06/09/23  1246   06/09/23  0930   06/09/23  0908   06/09/23  0554   06/09/23  0538        Albumin 2.7         1.8        2.7         1.8       Alkaline Phosphatase 124         82        124         82       ALT 3,298         2,233        3,298         2,233        Anion Gap 8         4       Ascending aorta   2.89             Ao peak jaime   0.87             Ao VTI   17.34             AST 5,262         4,400        5,262         4,400       AV valve area   3.16             AV mean gradient   2             AV index (prosthetic)   1.03             AV peak gradient   3             AV Velocity Ratio   1.18             Bilirubin Direct 0.2         0.2       BILIRUBIN TOTAL 0.5  Comment: For infants and newborns, interpretation of results should be based  on gestational age, weight and in agreement with clinical  observations.    Premature Infant recommended reference ranges:  Up to 24 hours.............<8.0 mg/dL  Up to 48 hours............<12.0 mg/dL  3-5 days..................<15.0 mg/dL  6-29 days.................<15.0 mg/dL           0.3  Comment: For infants and newborns, interpretation of results should be based  on gestational age, weight and in agreement with clinical  observations.    Premature Infant recommended reference ranges:  Up to 24 hours.............<8.0 mg/dL  Up to 48 hours............<12.0 mg/dL  3-5 days..................<15.0 mg/dL  6-29 days.................<15.0 mg/dL          0.5  Comment: For infants and newborns, interpretation of results should be based  on gestational age, weight and in agreement with clinical  observations.    Premature Infant recommended reference ranges:  Up to 24 hours.............<8.0 mg/dL  Up to 48 hours............<12.0 mg/dL  3-5 days..................<15.0 mg/dL  6-29 days.................<15.0 mg/dL           0.3  Comment: For infants and newborns, interpretation of results should be based  on gestational age, weight and in agreement with clinical  observations.    Premature Infant recommended reference ranges:  Up to 24 hours.............<8.0 mg/dL  Up to 48 hours............<12.0 mg/dL  3-5 days..................<15.0 mg/dL  6-29 days.................<15.0 mg/dL         BSA   2.07             BUN 32         24       Calcium  8.0         6.2  Comment: *Critical value notification by ml__ with confirmation of receipt to   ousmane jones rn___ at  Date_06/09___Time_07:46am___         Ionized Calcium     1.17           Chloride 111         117       CO2 23         18       Creatinine 3.1         1.9       Left Ventricle Relative Wall Thickness   0.39             E/A ratio   0.87             E/E' ratio   4.91             eGFR 15.7         28.2       EF   50             E wave deceleration time   239.94             FS   26             Glucose 87         75       IVSd   0.80             LA WIDTH   3.44             Left Atrium Major Axis   4.26             Left Atrium Minor Axis   4.56             LA size   3.54             LA volume   45.59             LA vol index   22.2             LVOT area   3.1             LV LATERAL E/E' RATIO   3.86             LV SEPTAL E/E' RATIO   6.75             LV EDV BP   82.02             LV Diastolic Volume Index   40.01             LVIDd   4.28             LVIDs   3.17             LV mass   107.99             LV Mass Index   53             LVOT diameter   1.98             LVOT peak valdemar   1.03             LVOT stroke volume   54.78             LVOT peak VTI   17.80             LV ESV BP   40.12             LV Systolic Volume Index   19.6             Mean e'   0.11             MV valve area p 1/2 method   3.16             MV Peak A Valdemar   0.62             MV Peak E Valdemar   0.54             MV stenosis pressure 1/2 time   69.58             POCT Glucose       105         Potassium 3.9         2.9       PROTEIN TOTAL 5.7         3.8        5.7         3.8       Posterior Wall   0.84             Right Atrial Pressure (from IVC)   15             RA Major Axis   4.51             RA Width   2.75             RV S'   12             RVDD   3.70             Sinus   3.00             Sodium 142         139       STJ   2.76             TAPSE   2.27             TDI SEPTAL   0.08             TDI LATERAL   0.14             Triscuspid  Valve Regurgitation Peak Gradient   23             TR Max Valdemar   2.42             Troponin I 1.527  Comment: The reference interval for Troponin I represents the 99th percentile   cutoff   for our facility and is consistent with 3rd generation assay   performance.           1.396  Comment: The reference interval for Troponin I represents the 99th percentile   cutoff   for our facility and is consistent with 3rd generation assay   performance.         TV rest pulmonary artery pressure   38             Vancomycin, Random 16.1                                        Assessment and Plan:       Demand ischemia of myocardium  Ms Pineda is a 68 fairly healthy lady with no significant risk factors for cardiovascular disease.  She had a recent hospitalization for csf leak s/p craniotomy and repair on 6/6 who presents 1 day after discharge with encephalopathy  acute liver injury, KAROLINA and elevated troponin.  Patient was found unresponsive by , they attribute this to her pain management medications, she received narcan in 2 occassions and mentation improved. She was taken to [facility] were she placed briefly on vasopressors and later transported to [facility] . Here patient found to have  She was found to have elevated troponin  which on 6/9. EKG with no st-t changes. Denied chest paint prior presenting to the ED as well as with daily exertion she (exercises 4-5 times a week  ).Elevated troponin likely 2/2 type II NSTEMI in light of evidence of markedly high transaminitis, elevated CK and lactate which have improved since admission supporting the suspicion of hypotensive shock.  Echo : EF preserved ( no concern for cardiogenic shock) although  it did show segmental left ventricular wall motion abnormalities possibly 2/2 to myocardium stunning.      Recommendations:  - Echocardiogram in 6-8 weeks    - FU with cardiology after Echo  -FU with PCP in 1-2 weeks         I will sign off. Thank you for your consult. Please reach out if  you need further assistance.      VTE Risk Mitigation (From admission, onward)         Ordered     IP VTE HIGH RISK PATIENT  Once         06/08/23 2225     Place sequential compression device  Until discontinued         06/08/23 2225                Ramiro Syed MD  Cardiology  [facility]

## 2023-06-09 NOTE — ASSESSMENT & PLAN NOTE
Presented as transfer from [facility] after arrived encephalopathic there, initialy with unknown cause. She arrived hypotensive and hypoxic, requirng levo gtt. She responded to narcan and was started on a Narcan gtt. After transfer accepted to [facility], she was found to be in acute liver failure  -CTH with initally post-op changes, discussed with NSGY. Repeat CTH ordered, Fall?  -Elevated ammonia/liver enzymes, hepatic encephalapathy?  -UTI, rocephin started  -considering concerns for global hypoxic event (liver, renal and cardiac injury), possible hypoxia stephanie injury  Admitted to [facility], hourly neuro checks  NSGY, Hepatology and cardiology consulted  SBP < 160  PT/OT

## 2023-06-09 NOTE — CONSULTS
[facility]  Cardiology  Consult Note    Patient Name: Tona Zafar  MRN: [id number]  Admission Date: 6/8/2023  Hospital Length of Stay: 0 days  Code Status: Full Code   Attending Provider: Marco Mathis DO   Consulting Provider: Analisa Souza MD  Primary Care Physician: Hubert Barrera MD  Principal Problem:<principal problem not specified>    Patient information was obtained from patient and ER records.     Inpatient consult to Cardiology  Consult performed by: Analisa Souza MD  Consult ordered by: Boston Malik NP        Subjective:     Chief Complaint:       HPI:   patient is a 68 y.o female with h/o arthritis, ischemic colitis, recent hospitalization for temporal bone cs leak s/p L temporal craniotomy and repair of defect on june 5th for whom cardiology has been consulted on for elevated troponin.   Underwent left temporal craniotomy and repair of defect on June 5th.  Was discharged on June 7th with gabapentin, Keppra, Percocet, and tamsulosin.   noted patient was weak, drowsy and could not stand on her way on the way home from the hospital and became somnolent during the drive and required neighbors to help carry her inside. She was somnolent the entire time she was at home, per . He gave her gabapentin and one tablet of Percocet that at 1800 and another dose of oxycodone at 2:00 a.m. Denies any additional tylenol or OTC meds. At 6:00 a.m. he found her unresponsive, diaphoretic and breathing slowly. EMS was called and O2 sats were 50% on arrival.  Received Narcan that caused agitation.  Somnolent again on arrival in ED at [facility]. Per chart was following basic commands and squeezing fingers when she was awake.  Was initially hypotensive requiring Levophed up to at least 0.4.  Responded to multiple doses of Narcan and became agitated. Initial INR 1.3, AST 3569, ALT 2074, ammonia 11, acetaminophen level undetectable, lactate 3.4.       Upon arrival she is  awake but drowsy, oriented to person, place, month, not date or situation. Does not recall events at home. Off levophed prior to transport.   on arrival vitals were stable , MAP 92, saturating 97% on 3L NC.   Troponin was obtained on arrival and was 1.8 which later uptrended to 2.9. EKG with no st-t changes. pt denies any cp or chest discomfort.  bedside TTE with preserved EF and no wall motion abnormality.   other labs with cpk 849, significantly elevated LFTs AST/ALT 8931/3275 ; Tbili 0.4. cr 1.9 from 0.7 a day ago.    last formal TTE in 2018 with EF 55-60%      Past Medical History:   Diagnosis Date    Abnormal bone density screening     Colon polyps     Degenerative joint disease 02/05/2007    Diverticulosis     Gastroparesis 2018    History of chicken pox     History of depression 02/05/2007    Ischemic colitis 2020    Dr. Drew    Lumbar disc disease        Past Surgical History:   Procedure Laterality Date    APPENDECTOMY      AUGMENTATION OF BREAST      BREAST SURGERY Bilateral 2005    Implants    CHOLECYSTECTOMY  06/2012    COLONOSCOPY  05/12/2020    Dr. Drew, in procedures; repeat in 6 months to check for healing of ischemic colitis    COLONOSCOPY  05/01/2018    Dr. Drew, in procedures    DENTAL SURGERY      DILATION AND CURETTAGE OF UTERUS      HIP SURGERY Right 2007    LUMBAR PUNCTURE N/A 6/5/2023    Procedure: LUMBAR PUNCTURE;  Surgeon: Rangel Perez DO;  Location: [facility] OR [address] [place] [address];  Service: Neurosurgery;  Laterality: N/A;    REPAIR, CSF LEAK, CRANIUM, MIDDLE FOSSA APPROACH Left 6/5/2023    Procedure: REPAIR, CSF LEAK, CRANIUM, MIDDLE FOSSA APPROACH;  Surgeon: Gasper Griffith MD;  Location: [facility] OR [address] [place] [address];  Service: ENT;  Laterality: Left;    REPAIR, CSF LEAK, CRANIUM, MIDDLE FOSSA APPROACH Left 6/5/2023    Procedure: REPAIR, CSF LEAK, CRANIUM, MIDDLE FOSSA APPROACH;  Surgeon: Rangel Perez DO;  Location: [facility] OR [address] [place] [address];  Service: Neurosurgery;  Laterality: Left;     TONSILLECTOMY      UPPER GASTROINTESTINAL ENDOSCOPY  04/24/2018    Dr. Drew, in media    WRIST SURGERY Left 01/2014       Review of patient's allergies indicates:  No Known Allergies    Current Facility-Administered Medications on File Prior to Encounter   Medication    [COMPLETED] lactated ringers bolus 1,000 mL    [COMPLETED] meropenem-0.9% sodium chloride 1 g/50 mL IVPB    [COMPLETED] naloxone 0.4 mg/mL injection 2 mg    [COMPLETED] naloxone 0.4 mg/mL injection 2 mg    [COMPLETED] naloxone 0.4 mg/mL injection 2 mg    [COMPLETED] vancomycin (VANCOCIN) 1,750 mg in dextrose 5 % (D5W) 500 mL IVPB    [DISCONTINUED] naloxone (NARCAN) 0.4 mg/mL injection    [DISCONTINUED] naloxone (NARCAN) 4 mg in dextrose 5 % (D5W) 100 mL infusion    [DISCONTINUED] NORepinephrine bitartrate-NaCl 8 mg/250 mL (32 mcg/mL) infusion     Current Outpatient Medications on File Prior to Encounter   Medication Sig    gabapentin (NEURONTIN) 300 MG capsule Take 1 capsule (300 mg total) by mouth 3 (three) times daily.    hydrOXYzine HCL (ATARAX) 25 MG tablet Take 1 tablet (25 mg total) by mouth 3 (three) times daily as needed for Anxiety.    levETIRAcetam (KEPPRA) 500 MG Tab Take 1 tablet (500 mg total) by mouth 2 (two) times daily. for 5 days    magnesium 200 mg Tab Take 200 mg by mouth once daily.    magnesium hydroxide 400 mg/5 ml (MILK OF MAGNESIA) 400 mg/5 mL Susp Take 30 mLs by mouth daily as needed.    microfibrillar collagen powder Apply 1 g topically as needed.    oxyCODONE-acetaminophen (PERCOCET)  mg per tablet Take 1 tablet by mouth every 6 (six) hours as needed for Pain.    tamsulosin (FLOMAX) 0.4 mg Cap Take 1 capsule (0.4 mg total) by mouth once daily.    tumeric-ging-olive-oreg-capryl 100 mg-150 mg- 50 mg-150 mg Cap Take by mouth.    TURMERIC ORAL Take 1 capsule by mouth once daily.    UNABLE TO FIND Take 1 capsule by mouth once daily. medication name: Beet root    UNABLE TO FIND Take 1 capsule by  mouth once daily. medication name: Dwight Root    UNABLE TO FIND Take 2 capsules by mouth once daily. medication name: Nitric Oxide    vitamin D3/vitamin K2, MK4, (K2 PLUS D3 ORAL) Take 1 capsule by mouth once daily.    zinc 50 mg Tab Take 50 mg by mouth once daily.    [DISCONTINUED] DWIGHT EXTRACT ORAL Take by mouth.    [DISCONTINUED] NITRIC OXIDE GAS INHL Inhale into the lungs. Capsule     Family History       Problem Relation (Age of Onset)    Colon cancer Maternal Grandfather    Diabetes Mother    Throat cancer Father          Tobacco Use    Smoking status: Former     Types: Cigarettes    Smokeless tobacco: Never   Substance and Sexual Activity    Alcohol use: Yes     Alcohol/week: 1.0 standard drink     Types: 1 Glasses of wine per week     Comment: occasional    Drug use: No    Sexual activity: Yes     Birth control/protection: Post-menopausal     Review of Systems   Unable to perform ROS: Mental status change   Objective:     Vital Signs (Most Recent):  Temp: 99.8 °F (37.7 °C) (06/08/23 2205)  Pulse: 87 (06/08/23 2230)  Resp: (!) 25 (06/08/23 2230)  BP: 128/75 (06/08/23 2205)  SpO2: (!) 93 % (06/08/23 2230) Vital Signs (24h Range):  Temp:  [97.5 °F (36.4 °C)-99.8 °F (37.7 °C)] 99.8 °F (37.7 °C)  Pulse:  [] 87  Resp:  [14-32] 25  SpO2:  [92 %-100 %] 93 %  BP: ()/() 128/75        There is no height or weight on file to calculate BMI.    SpO2: (!) 93 %       No intake or output data in the 24 hours ending 06/08/23 2340    Lines/Drains/Airways       Peripheral Intravenous Line  Duration                  Peripheral IV - Single Lumen 20 G Posterior;Right Hand -- days         Peripheral IV - Single Lumen 06/08/23 0854 18 G Right Antecubital <1 day         Peripheral IV - Single Lumen 06/08/23 1317 20 G Left Antecubital <1 day                     Physical Exam  Constitutional:       Appearance: She is ill-appearing.   HENT:      Mouth/Throat:      Mouth: Mucous membranes are moist.   Eyes:       Conjunctiva/sclera: Conjunctivae normal.   Cardiovascular:      Rate and Rhythm: Normal rate and regular rhythm.      Heart sounds: No murmur heard.  Pulmonary:      Comments: On 3L NC  Abdominal:      General: Abdomen is flat.      Tenderness: There is abdominal tenderness.   Skin:     General: Skin is warm.        Significant Labs: All pertinent lab results from the last 24 hours have been reviewed.    Significant Imaging: Echocardiogram: Transthoracic echo (TTE) complete (Cupid Only): No results found for this or any previous visit.    Assessment and Plan:     Elevated troponin  No significant risk factors aside from prior tobacco use  had recent hospitalization for csf leak s/p craniotomy and repair on 6/6 who presents with encephalopathy, acute liver injury, KAROLINA and elevated troponin 1.8-->2.9.  EKG with no st-t changes. pt without cp/chest discomfort.    elevated troponin likely 2/2 type II NSTEMI in light of ALI and possible shock.    extremities are warm, lactate wnl, BP stable, EF preserved ( no concern for cardiogenic shock)    recommendation:  -- This is unlikely to be type 1 NSTEMI  -- order formal TTE in the AM  -- if cp/chest discomfort repeat EKG and Troponin  -- keep pt on tele  -- would not recommend initiating ACS protocol at the time      discussed case with on call cardiology staff    VTE Risk Mitigation (From admission, onward)         Ordered     IP VTE HIGH RISK PATIENT  Once         06/08/23 2225     Place sequential compression device  Until discontinued         06/08/23 2225                Thank you for your consult. I will follow in AM    Analisa Souza MD  Cardiology   [facility]

## 2023-06-09 NOTE — PLAN OF CARE
POC established and functional mobility goals were created to help pt return to PLOF. Will be reassessed as appropriate to measure pt progress.    Problem: Physical Therapy  Goal: Physical Therapy Goal  Description: Goals to be met by: 23     Patient will increase functional independence with mobility by performin. Supine to sit with Modified [place]  2. Sit to supine with Modified [place]  3. Sit to stand transfer with Modified [place]  4. Bed to chair transfer with Modified [place] using LRAD if needed  5. Gait  x 150 feet with Supervision using LRAD if needed.   6. Lower extremity exercise program x10 reps per handout, with supervision    Outcome: Ongoing, Progressing

## 2023-06-09 NOTE — ASSESSMENT & PLAN NOTE
Elevated troponin at OSH with EKG T wave changes/BBB?  Repeated on arrival, with continued uptrend, EKG improved.  Cardiology consulted, thoughts are NSTEMI  With no need for ACS protocol/Heparin    Will trend Troponin till down trend

## 2023-06-09 NOTE — PLAN OF CARE
06/09/23 1634   Post-Acute Status   Post-Acute Authorization Home Health   Home Health Status Referrals Sent  ([facility])     SW met with Pt and Pt  at bedside. Discussed therapy recs for HH and provided list. Addressed questions and advised by Pt  they would continue with [facility] as preference.    Sent via [employer].    Fabi Samson LCSW  Neurocritical Care   [facility]  [id number]

## 2023-06-09 NOTE — CONSULTS
Consult Note  Neurosurgery    Admit Date: 6/8/2023  LOS: 1    Code Status: Full Code     CC: Acute encephalopathy    SUBJECTIVE:     History of Present Illness: 67 yo female with known history of left tegmen mastoideum defect and temporal encephalocele s/p recent repair now admitted to [facility] with acute liver failure after being found significantly altered. No acute pathology on cranial imaging. Neurosurgery consulted due to recent surgery.    On exam pt is visibly jaundiced with since recovered from encephalopathic event. GCS 15 and without focal deficit. Mild incisional pain.    Pt also with potential concern per [facility] for ACS, for which therapeutic anticoagulation may be indicated. Cautioned that such treatment be reserved for life-saving action as would significantly increase risk of intracranial bleeding.         OBJECTIVE:   Vital Signs (Most Recent):   Temp: 99.8 °F (37.7 °C) (06/08/23 2205)  Pulse: 86 (06/09/23 0110)  Resp: (!) 23 (06/09/23 0046)  BP: (!) 154/77 (06/09/23 0005)  SpO2: (!) 93 % (06/08/23 2230)    Vital Signs (24h Range):   Temp:  [97.5 °F (36.4 °C)-99.8 °F (37.7 °C)] 99.8 °F (37.7 °C)  Pulse:  [] 86  Resp:  [14-32] 23  SpO2:  [92 %-100 %] 93 %  BP: ()/() 154/77    ICP/CPP (Last 24h):        I & O (Last 24h):  No intake or output data in the 24 hours ending 06/09/23 0135    Physical Exam:  Neuro:  GCS [id number]    Pupils: Equal and Reactive.   Extraocular Movements: Conjugate, normal movement.    Cranial Nerve Reflexes:  Corneal: Deferred.  Oculocephalics: Deferred.  Cough/Gag: Deferred.    Motor Exam:  Left Upper Extremity:Follows Commands.  Right Upper Extremity: Follows Commands.  Left Lower Extremity: Follows Commands.  Right Lower Extremity: Follows Commands.             Lines/Drains/Airway:          Nutrition/Tube Feeds:   Current Diet Order   Procedures    Diet NPO     No eating, drinking, or oral medications until patient passes the Wallace or evaluated by Speech.        Labs:  ABG:   Recent Labs   Lab 06/08/23  1332   PH 7.27*   PO2 45   PCO2 48*   POCSATURATED 81.4*     BMP:  Recent Labs   Lab 06/08/23  0846 06/08/23 2005    139   K 4.7 4.2    109   CO2 28 21*   BUN 17 23   CREATININE 1.49* 1.9*   * 102   MG 2.4  --      LFT:   Lab Results   Component Value Date    AST 7,957 (H) 06/08/2023    ALT 3,213 (H) 06/08/2023    ALKPHOS 110 06/08/2023    BILITOT 0.4 06/08/2023    ALBUMIN 2.5 (L) 06/08/2023    PROT 5.3 (L) 06/08/2023     CBC:   Lab Results   Component Value Date    WBC 5.70 06/08/2023    HGB 9.7 (L) 06/08/2023    HCT 31.2 (L) 06/08/2023    MCV 88 06/08/2023     06/08/2023     Microbiology x 7d:   Microbiology Results (last 7 days)       ** No results found for the last 168 hours. **              ASSESSMENT/PLAN:   69 yo female with known history of left tegmen mastoideum defect and temporal encephalocele s/p recent repair now admitted to [facility] with acute liver failure after being found significantly altered. No acute pathology on cranial imaging.    --No acute neurosurgical intervention required.  --Continue care per primary team.  --Should anticoagulation be absolutely mandated as a life saving measure, please obtain non-contrast head CT before initiation and following therapeutic levels and contact neurosurgery once completed.  --Neurosurgery will be signing off, please contact us with any questions or concerns.      Kailash Mix

## 2023-06-09 NOTE — ASSESSMENT & PLAN NOTE
pt without history of known htn, dm, hld, or CAD; former tobacco user  had recent hospitalization for csf leak s/p craniotomy and repair on 6/6 who presents with encephalopathy, acute liver injury, KAROLINA and elevated troponin 1.8-->2.9.  EKG with no st-t changes. pt without cp/chest discomfort.    elevated troponin likely 2/2 type II NSTEMI in light of ALI and possible shock.    extremities are warm, lactate wnl, BP stable, EF preserved ( no concern for cardiogenic shock)    recommendation:  -- This is unlikely to be type 1 NSTEMI  -- order formal TTE in the AM  -- if cp/chest discomfort repeat EKG and Troponin  -- would not recommend initiating ACS protocol at the time

## 2023-06-09 NOTE — SUBJECTIVE & OBJECTIVE
Interval History: This am per  patient is more alert. Troponins are trending downward, denies chest pain or  SOB during examination. Was slightly confused in AM  examination and back to baseline by 1600 hrs. Echo did show segmental left ventricular wall motion abnormalities.    Review of Systems   Constitutional: Negative for chills and fever.   HENT:  Negative for congestion.    Eyes:  Negative for redness.   Cardiovascular:  Negative for chest pain, irregular heartbeat and palpitations.   Respiratory:  Positive for shortness of breath (not present today but affirms SOB prior being found unresponsive). Negative for cough and wheezing.    Skin:  Negative for color change and nail changes.   Musculoskeletal:  Positive for arthritis.   Gastrointestinal:  Positive for diarrhea (most likely 2/2 to opiod reversal). Negative for abdominal pain, hematemesis, jaundice, nausea and vomiting.   Genitourinary:  Negative for dysuria.   Neurological:  Positive for difficulty with concentration.   Psychiatric/Behavioral:  Positive for altered mental status.    Objective:     Vital Signs (Most Recent):  Temp: 98.5 °F (36.9 °C) (06/09/23 0305)  Pulse: 72 (06/09/23 0701)  Resp: (!) 31 (06/09/23 0701)  BP: 128/62 (06/09/23 0605)  SpO2: 97 % (06/09/23 0701) Vital Signs (24h Range):  Temp:  [97.5 °F (36.4 °C)-99.8 °F (37.7 °C)] 98.5 °F (36.9 °C)  Pulse:  [] 72  Resp:  [14-35] 31  SpO2:  [91 %-97 %] 97 %  BP: ()/(37-90) 128/62     Weight: 86.6 kg (191 lb)  Body mass index is 27.41 kg/m².     SpO2: 97 %         Intake/Output Summary (Last 24 hours) at 6/9/2023 1110  Last data filed at 6/9/2023 0531  Gross per 24 hour   Intake 950 ml   Output 150 ml   Net 800 ml       Lines/Drains/Airways       Peripheral Intravenous Line  Duration                  Peripheral IV - Single Lumen 20 G Posterior;Right Hand -- days         Peripheral IV - Single Lumen 06/08/23 0854 18 G Right Antecubital 1 day         Peripheral IV - Single  Lumen 06/08/23 1317 20 G Left Antecubital <1 day                       Physical Exam  Constitutional:       General: She is not in acute distress.     Appearance: She is not toxic-appearing.   HENT:      Head:        Comments: Craniotomy scar, clean no erythema present. Hematoma present on left cheek. Denies trauma states its residual from craniotomy.     Mouth/Throat:      Mouth: Mucous membranes are moist.   Eyes:      Conjunctiva/sclera: Conjunctivae normal.   Cardiovascular:      Rate and Rhythm: Normal rate and regular rhythm.      Heart sounds: No murmur heard.  Pulmonary:      Effort: Pulmonary effort is normal.      Breath sounds: Normal breath sounds.      Comments: On 3L NC  Abdominal:      General: Abdomen is flat.      Tenderness: There is abdominal tenderness.   Musculoskeletal:         General: No tenderness.      Right lower leg: No edema.      Left lower leg: No edema.   Skin:     General: Skin is warm.      Coloration: Skin is pale. Skin is not jaundiced.   Neurological:      Mental Status: She is oriented to person, place, and time.      Motor: Weakness (hand  3/5) present.   Psychiatric:         Mood and Affect: Mood normal.          Significant Labs:   Recent Lab Results  (Last 5 results in the past 24 hours)        06/09/23  1246   06/09/23  0930   06/09/23  0908   06/09/23  0554   06/09/23  0538        Albumin 2.7         1.8        2.7         1.8       Alkaline Phosphatase 124         82        124         82       ALT 3,298         2,233        3,298         2,233       Anion Gap 8         4       Ascending aorta   2.89             Ao peak jaime   0.87             Ao VTI   17.34             AST 5,262         4,400        5,262         4,400       AV valve area   3.16             AV mean gradient   2             AV index (prosthetic)   1.03             AV peak gradient   3             AV Velocity Ratio   1.18             Bilirubin Direct 0.2         0.2       BILIRUBIN TOTAL 0.5  Comment:  For infants and newborns, interpretation of results should be based  on gestational age, weight and in agreement with clinical  observations.    Premature Infant recommended reference ranges:  Up to 24 hours.............<8.0 mg/dL  Up to 48 hours............<12.0 mg/dL  3-5 days..................<15.0 mg/dL  6-29 days.................<15.0 mg/dL           0.3  Comment: For infants and newborns, interpretation of results should be based  on gestational age, weight and in agreement with clinical  observations.    Premature Infant recommended reference ranges:  Up to 24 hours.............<8.0 mg/dL  Up to 48 hours............<12.0 mg/dL  3-5 days..................<15.0 mg/dL  6-29 days.................<15.0 mg/dL          0.5  Comment: For infants and newborns, interpretation of results should be based  on gestational age, weight and in agreement with clinical  observations.    Premature Infant recommended reference ranges:  Up to 24 hours.............<8.0 mg/dL  Up to 48 hours............<12.0 mg/dL  3-5 days..................<15.0 mg/dL  6-29 days.................<15.0 mg/dL           0.3  Comment: For infants and newborns, interpretation of results should be based  on gestational age, weight and in agreement with clinical  observations.    Premature Infant recommended reference ranges:  Up to 24 hours.............<8.0 mg/dL  Up to 48 hours............<12.0 mg/dL  3-5 days..................<15.0 mg/dL  6-29 days.................<15.0 mg/dL         BSA   2.07             BUN 32         24       Calcium 8.0         6.2  Comment: *Critical value notification by ml__ with confirmation of receipt to   ousmane jones rn___ at  Date_06/09___Time_07:46am___         Ionized Calcium     1.17           Chloride 111         117       CO2 23         18       Creatinine 3.1         1.9       Left Ventricle Relative Wall Thickness   0.39             E/A ratio   0.87             E/E' ratio   4.91             eGFR 15.7         28.2        EF   50             E wave deceleration time   239.94             FS   26             Glucose 87         75       IVSd   0.80             LA WIDTH   3.44             Left Atrium Major Axis   4.26             Left Atrium Minor Axis   4.56             LA size   3.54             LA volume   45.59             LA vol index   22.2             LVOT area   3.1             LV LATERAL E/E' RATIO   3.86             LV SEPTAL E/E' RATIO   6.75             LV EDV BP   82.02             LV Diastolic Volume Index   40.01             LVIDd   4.28             LVIDs   3.17             LV mass   107.99             LV Mass Index   53             LVOT diameter   1.98             LVOT peak valdemar   1.03             LVOT stroke volume   54.78             LVOT peak VTI   17.80             LV ESV BP   40.12             LV Systolic Volume Index   19.6             Mean e'   0.11             MV valve area p 1/2 method   3.16             MV Peak A Valdemar   0.62             MV Peak E Valdemar   0.54             MV stenosis pressure 1/2 time   69.58             POCT Glucose       105         Potassium 3.9         2.9       PROTEIN TOTAL 5.7         3.8        5.7         3.8       Posterior Wall   0.84             Right Atrial Pressure (from IVC)   15             RA Major Axis   4.51             RA Width   2.75             RV S'   12             RVDD   3.70             Sinus   3.00             Sodium 142         139       STJ   2.76             TAPSE   2.27             TDI SEPTAL   0.08             TDI LATERAL   0.14             Triscuspid Valve Regurgitation Peak Gradient   23             TR Max Valdemar   2.42             Troponin I 1.527  Comment: The reference interval for Troponin I represents the 99th percentile   cutoff   for our facility and is consistent with 3rd generation assay   performance.           1.396  Comment: The reference interval for Troponin I represents the 99th percentile   cutoff   for our facility and is consistent with 3rd  generation assay   performance.         TV rest pulmonary artery pressure   38             Vancomycin, Random 16.1

## 2023-06-09 NOTE — NURSING
Patient arrived to [facility] rm [id number] from [facility] by Ambulance     Type of stroke/diagnosis:  AMS with elevated liver enzymes    TPA start and end time N/A    Thrombectomy start and end time N/A  Current symptoms: Asterixis, Pupils 3 B&E, spontaneous movement X4,     Skin assessment done: Y  Wounds noted: L cheek Bruise, L crani site, Lower back bruise, L wrist bruise    *If wounds noted, was Wound Care consulted? N    [place] Completed? Y    Patient Belongings on Admit: (be specific) Gabapentin (86) Oxycodone/Acetamenophin (32) Kepra (8)     NCC notified: Inder Mathis

## 2023-06-09 NOTE — SUBJECTIVE & OBJECTIVE
Past Medical History:   Diagnosis Date    Abnormal bone density screening     Colon polyps     Degenerative joint disease 02/05/2007    Diverticulosis     Gastroparesis 2018    History of chicken pox     History of depression 02/05/2007    Ischemic colitis 2020    Dr. Drew    Lumbar disc disease      Past Surgical History:   Procedure Laterality Date    APPENDECTOMY      AUGMENTATION OF BREAST      BREAST SURGERY Bilateral 2005    Implants    CHOLECYSTECTOMY  06/2012    COLONOSCOPY  05/12/2020    Dr. Drew, in procedures; repeat in 6 months to check for healing of ischemic colitis    COLONOSCOPY  05/01/2018    Dr. Drew, in procedures    DENTAL SURGERY      DILATION AND CURETTAGE OF UTERUS      HIP SURGERY Right 2007    LUMBAR PUNCTURE N/A 6/5/2023    Procedure: LUMBAR PUNCTURE;  Surgeon: Rangel Perez DO;  Location: [facility] OR 2ND FLR;  Service: Neurosurgery;  Laterality: N/A;    REPAIR, CSF LEAK, CRANIUM, MIDDLE FOSSA APPROACH Left 6/5/2023    Procedure: REPAIR, CSF LEAK, CRANIUM, MIDDLE FOSSA APPROACH;  Surgeon: Gasper Griffith MD;  Location: [facility] OR 2ND FLR;  Service: ENT;  Laterality: Left;    REPAIR, CSF LEAK, CRANIUM, MIDDLE FOSSA APPROACH Left 6/5/2023    Procedure: REPAIR, CSF LEAK, CRANIUM, MIDDLE FOSSA APPROACH;  Surgeon: Rangel Perez DO;  Location: [facility] OR 2ND FLR;  Service: Neurosurgery;  Laterality: Left;    TONSILLECTOMY      UPPER GASTROINTESTINAL ENDOSCOPY  04/24/2018    Dr. Drew, in media    WRIST SURGERY Left 01/2014      Current Facility-Administered Medications on File Prior to Encounter   Medication Dose Route Frequency Provider Last Rate Last Admin    [COMPLETED] lactated ringers bolus 1,000 mL  1,000 mL Intravenous ED 1 Time Misty Mitchell MD   Stopped at 06/08/23 1100    [COMPLETED] meropenem-0.9% sodium chloride 1 g/50 mL IVPB  1 g Intravenous ED 1 Time Misty Mitchell MD   Stopped at 06/08/23 1426    [COMPLETED] naloxone 0.4 mg/mL injection 2 mg  2 mg Intravenous ED 1 Time Misty Mitchell MD    2 mg at 06/08/23 1007    [COMPLETED] naloxone 0.4 mg/mL injection 2 mg  2 mg Intravenous ED 1 Time Misty Mitchell MD   2 mg at 06/08/23 1223    [COMPLETED] naloxone 0.4 mg/mL injection 2 mg  2 mg Intravenous ED 1 Time Misty Mitchell MD   2 mg at 06/08/23 0915    [COMPLETED] vancomycin (VANCOCIN) 1,750 mg in dextrose 5 % (D5W) 500 mL IVPB  20 mg/kg Intravenous ED 1 Time Misty Mitchell MD   Stopped at 06/08/23 1635    [DISCONTINUED] naloxone (NARCAN) 0.4 mg/mL injection             [DISCONTINUED] naloxone (NARCAN) 4 mg in dextrose 5 % (D5W) 100 mL infusion  1.25 mg/hr Intravenous Continuous Misty Mitchell MD 31.3 mL/hr at 06/08/23 1309 1.25 mg/hr at 06/08/23 1309    [DISCONTINUED] NORepinephrine bitartrate-NaCl 8 mg/250 mL (32 mcg/mL) infusion  0-3 mcg/kg/min Intravenous Continuous Misty Mitchell MD   Stopped at 06/08/23 1326     Current Outpatient Medications on File Prior to Encounter   Medication Sig Dispense Refill    gabapentin (NEURONTIN) 300 MG capsule Take 1 capsule (300 mg total) by mouth 3 (three) times daily. 90 capsule 11    hydrOXYzine HCL (ATARAX) 25 MG tablet Take 1 tablet (25 mg total) by mouth 3 (three) times daily as needed for Anxiety. 42 tablet 0    levETIRAcetam (KEPPRA) 500 MG Tab Take 1 tablet (500 mg total) by mouth 2 (two) times daily. for 5 days 10 tablet 0    magnesium 200 mg Tab Take 200 mg by mouth once daily.      magnesium hydroxide 400 mg/5 ml (MILK OF MAGNESIA) 400 mg/5 mL Susp Take 30 mLs by mouth daily as needed.      microfibrillar collagen powder Apply 1 g topically as needed.      oxyCODONE-acetaminophen (PERCOCET)  mg per tablet Take 1 tablet by mouth every 6 (six) hours as needed for Pain. 40 tablet 0    tamsulosin (FLOMAX) 0.4 mg Cap Take 1 capsule (0.4 mg total) by mouth once daily. 30 capsule 11    tumeric-ging-olive-oreg-capryl 100 mg-150 mg- 50 mg-150 mg Cap Take by mouth.      TURMERIC ORAL Take 1 capsule by mouth once daily.      UNABLE TO FIND Take 1 capsule by mouth  once daily. medication name: Beet root      UNABLE TO FIND Take 1 capsule by mouth once daily. medication name: Federica Root      UNABLE TO FIND Take 2 capsules by mouth once daily. medication name: Nitric Oxide      vitamin D3/vitamin K2, MK4, (K2 PLUS D3 ORAL) Take 1 capsule by mouth once daily.      zinc 50 mg Tab Take 50 mg by mouth once daily.        Allergies: Patient has no known allergies.    Family History   Problem Relation Age of Onset    Diabetes Mother     Throat cancer Father     Colon cancer Maternal Grandfather     Breast cancer Neg Hx     Ovarian cancer Neg Hx     Crohn's disease Neg Hx     Esophageal cancer Neg Hx     Ulcerative colitis Neg Hx     Stomach cancer Neg Hx     Celiac disease Neg Hx      Social History     Tobacco Use    Smoking status: Former     Types: Cigarettes    Smokeless tobacco: Never   Substance Use Topics    Alcohol use: Yes     Alcohol/week: 1.0 standard drink     Types: 1 Glasses of wine per week     Comment: occasional    Drug use: No     Review of Systems   Reason unable to perform ROS: limited due to exam.   Eyes:  Negative for visual disturbance.   Respiratory:  Negative for shortness of breath.    Cardiovascular:  Negative for chest pain and palpitations.   Gastrointestinal:  Negative for abdominal pain and vomiting.   Endocrine: Negative for polyuria.   Genitourinary:  Negative for dysuria.   Neurological:  Positive for headaches.   Objective:     Vitals:    Temp: 99.8 °F (37.7 °C)  Pulse: 87  Rhythm: normal sinus rhythm  BP: (!) 154/77  MAP (mmHg): 104  Resp: (!) 25  SpO2: (!) 93 %    Temp  Min: 97.5 °F (36.4 °C)  Max: 99.8 °F (37.7 °C)  Pulse  Min: 84  Max: 117  BP  Min: 88/67  Max: 162/77  MAP (mmHg)  Min: 59  Max: 144  Resp  Min: 14  Max: 32  ETCO2 (mmHg)  Min: 15 mmHg  Max: 33 mmHg  SpO2  Min: 92 %  Max: 100 %    No intake/output data recorded.            Physical Exam  Vitals and nursing note reviewed.   HENT:      Head:      Comments: Surgical incision c/d/I to  left temple     Mouth/Throat:      Pharynx: Oropharynx is clear.   Cardiovascular:      Rate and Rhythm: Normal rate and regular rhythm.      Pulses: Normal pulses.      Heart sounds: Normal heart sounds.   Pulmonary:      Effort: Pulmonary effort is normal.      Breath sounds: Normal breath sounds.   Abdominal:      Palpations: Abdomen is soft.   Skin:     General: Skin is dry.      Capillary Refill: Capillary refill takes 2 to 3 seconds.   Neurological:      Comments: E4 V5 M 6  Awake, o x person and place (wax/wane)  Follows commands intermittently  Strength appears symmetrical upper and lower extremities  Asterixis upper extremities           Today I personally reviewed pertinent medications, lines/drains/airways, imaging, cardiology results, laboratory results, microbiology results,

## 2023-06-09 NOTE — ASSESSMENT & PLAN NOTE
Presented at OSH with jamin increase in AST/ALT over pst 24hours,   AST/ALT normal on 6/7 and acutely increased to ASt 3569, ALT 2074 in 24 hrs  AST/ALT continuing to climb, now AST > 8900, ALT > 3200  Bili is WNL  Ammonia 90's  Hepatology consulted  Liver US with doppler pending  Coags currently WNL  Will trend labs  -thoughts are global hypoxic event

## 2023-06-09 NOTE — ASSESSMENT & PLAN NOTE
Presented as transfer from [facility] after arrived encephalopathic there, initialy with unknown cause. She arrived hypotensive and hypoxic, nstemi, shock liver, feroz, and acute cystits from enterococcus uti     Today;   Off pressors (no pe, acs, possibly septic but  rapid decline and improvement seems unlikely, suspect drug related)  trops downtrending, new wma on tte,   encephalopathy improving, off narcan gtt  shock liver, improving (us unrevealing)  Feroz, non oliguric, good solute clearance  cth with possible L temporal collection/infarct, unlikely to have caused shock    -q4 neuro checks  -pt/ot, oob, slp  -cont vanc for enterococcus uti, awaiting c/s  -hold on ac/ap, consider bb if remains hd stable  -appreciate consultant input

## 2023-06-09 NOTE — SUBJECTIVE & OBJECTIVE
Objective:     Vitals:  Pulse: 72  Rhythm: normal sinus rhythm  BP: 128/62  MAP (mmHg): 89  Resp: (!) 31  SpO2: 97 %    Temp  Min: 97.5 °F (36.4 °C)  Max: 99.8 °F (37.7 °C)  Pulse  Min: 70  Max: 87  BP  Min: 99/71  Max: 154/72  MAP (mmHg)  Min: 85  Max: 104  Resp  Min: 15  Max: 35  ETCO2 (mmHg)  Min: 28 mmHg  Max: 28 mmHg  SpO2  Min: 91 %  Max: 97 %    06/08 0701 - 06/09 0700  In: 950 [I.V.:350]  Out: 150 [Urine:150]   Unmeasured Output  Stool Occurrence: 2        Physical Exam  Vital signs, lab studies, and imaging reviewed by me  Alert, oriented x3, intact concentration (spells WORLD backwards) cranial II-XII intact, sensation full, moves all extremities with full strength, no dysmetria  Warm and well perfused, regular rate and rhythm, no murmurs  No dependent edema  Breathing comfortably, breath sounds clear  Belly soft, nontender, no hepatosplenomegaly       Medications:  Continuoussodium chloride 0.9%, Last Rate: 75 mL/hr at 06/09/23 0051    ScheduledcefTRIAXone (ROCEPHIN) IVPB, 1 g, Q24H  lactulose, 15 g, Q6H    PRNhydrOXYzine HCL, 25 mg, TID PRN  labetalol, 10 mg, Q15 Min PRN  magnesium oxide, 800 mg, PRN  magnesium oxide, 800 mg, PRN  ondansetron, 4 mg, Q6H PRN  oxyCODONE, 2.5 mg, Q4H PRN  potassium bicarbonate, 35 mEq, PRN  potassium bicarbonate, 50 mEq, PRN  potassium bicarbonate, 60 mEq, PRN  potassium, sodium phosphates, 2 packet, PRN  potassium, sodium phosphates, 2 packet, PRN  potassium, sodium phosphates, 2 packet, PRN  sodium chloride 0.9%, 10 mL, PRN  vancomycin - pharmacy to dose, , pharmacy to manage frequency

## 2023-06-09 NOTE — PT/OT/SLP EVAL
Occupational Therapy   Co-Evaluation/Treatment     Name: Tona Zafar  MRN: [id number]  Admitting Diagnosis: Acute encephalopathy  Recent Surgery: * No surgery found *      Recommendations:     Discharge Recommendations: home health OT  Discharge Equipment Recommendations:  none  Barriers to discharge:  None    Assessment:     Tona Zafar is a 68 y.o. female with a medical diagnosis of Acute encephalopathy.  She presents with the following performance deficits affecting function: weakness, impaired endurance, impaired self care skills, impaired functional mobility, gait instability, impaired balance, impaired cognition, impaired cardiopulmonary response to activity.      Pt agreeable to therapy and tolerated the session well. She required total A for toileting at bed level after being found on the bed pan. She performed feeding with SBA and grooming sitting EOB with SBA as well. Pt then transferred from the bed to the bedside commode with CGA and HHA. Pt would benefit from continued skilled acute OT services in order to maximize independence and safety with ADLs and functional mobility to ensure safe return to PLOF in the least restrictive environment. OT recommending HHOT once pt is medically appropriate for d/c.     Rehab Prognosis: Good; patient would benefit from acute skilled OT services to address these deficits and reach maximum level of function.       Plan:     Patient to be seen 3 x/week to address the above listed problems via self-care/home management, therapeutic activities, therapeutic exercises, neuromuscular re-education  Plan of Care Expires: 07/09/23  Plan of Care Reviewed with: patient, spouse    Subjective     Chief Complaint: Weakness   Patient/Family Comments/goals: To return to PLOF    Occupational Profile:  Living Environment: Pt lives with her  in a H with 0 HAMILTON and a tub/shower combo   Previous level of function: Independent with ADLs and functional mobility   Equipment Used  at Home: none  Assistance upon Discharge: Will have assist from      Pain/Comfort:  Pain Rating 1: 0/10    Patients cultural, spiritual, Mormon conflicts given the current situation: no    Objective:     Co-evaluation/treatment performed due to patient's multiple deficits requiring two skilled therapists to appropriately and safely assess patient's strength and endurance while facilitating functional tasks in addition to accommodating for patient's activity tolerance.     Communicated with: RN prior to session.  Patient found HOB elevated with blood pressure cuff, pulse ox (continuous), telemetry, peripheral IV, PureWick, SCD, oxygen upon OT entry to room.    General Precautions: Standard, fall  Orthopedic Precautions: N/A  Braces: N/A  Respiratory Status: Room air    Occupational Performance:    Bed Mobility:    Patient completed Rolling/Turning to Right with stand by assistance  Patient completed Scooting/Bridging with stand by assistance  Patient completed Supine to Sit with minimum assistance    Functional Mobility/Transfers:  Patient completed Sit <> Stand Transfer with minimum assistance  with  hand-held assist   Patient completed Bed <> Chair Transfer using Step Transfer technique with contact guard assistance with no assistive device  Functional Mobility: Pt engaging in functional mobility to simulate household/community distances with CGA and utilizing no AD in order to maximize functional activity tolerance and standing balance required for engagement in occupations of choice.    Activities of Daily Living:  Feeding:  stand by assistance : to take sips of juice sitting EOB   Grooming: stand by assistance : to brush hair sitting EOB   Toileting: Total Assistance for hygiene after found on bed pan with Pt assisting in the rolls     Cognitive/Visual Perceptual:  Cognitive/Psychosocial Skills:     -       Oriented to: Person, Place, and Situation   -       Follows Commands/attention:Follows  multistep  commands  -       Communication: clear/fluent  -       Memory: Impaired STM, Impaired LTM, and Poor immediate recall  -       Safety awareness/insight to disability: impaired   -       Mood/Affect/Coping skills/emotional control: Appropriate to situation  Visual/Perceptual:      -Intact visual field     Physical Exam:  Balance:  Static Sitting   stand by assistance   Dynamic Sitting   stand by assistance   Static Standing   contact guard assistance   Dynamic Standing   contact guard assistance     Upper Extremity Function:   Dominance: Right   Left UE Right UE   UE Edema None noted None noted   UE ROM WFL WFL   UE Strength WFL WFL    Strength WFL WFL   Sensation    -       Intact    -       Intact   Fine Motor Skills:     -       Intact    -       Intact   Gross Motor Skills: WFL   WFL       AMPAC 6 Click ADL:  AMPAC Total Score: 20    Treatment & Education:  Therapist provided facilitation and instruction of proper body mechanics and fall prevention strategies during tasks listed above.  Instructed patient to sit in bedside chair daily to increase OOB/activity tolerance.  Instructed patient to use call light to have nursing staff assist with needs/transfers.  Discussed OT POC and answered all questions within OT scope of practice.  Whiteboard updated       Patient left up in chair with all lines intact, call button in reach, chair alarm on, and RN notified    GOALS:   Multidisciplinary Problems       Occupational Therapy Goals          Problem: Occupational Therapy    Goal Priority Disciplines Outcome Interventions   Occupational Therapy Goal     OT, PT/OT Ongoing, Progressing    Description: Goals to be met by: 6/23/23     Patient will increase functional independence with ADLs by performing:    UE Dressing with Supervision.  LE Dressing with Supervision.  Grooming while standing at sink with Supervision.  Toileting from toilet with Supervision for hygiene and clothing management.   Toilet transfer  to toilet with Supervision.                         History:     Past Medical History:   Diagnosis Date    Abnormal bone density screening     Colon polyps     Degenerative joint disease 02/05/2007    Diverticulosis     Gastroparesis 2018    History of chicken pox     History of depression 02/05/2007    Ischemic colitis 2020    Dr. Drew    Lumbar disc disease          Past Surgical History:   Procedure Laterality Date    APPENDECTOMY      AUGMENTATION OF BREAST      BREAST SURGERY Bilateral 2005    Implants    CHOLECYSTECTOMY  06/2012    COLONOSCOPY  05/12/2020    Dr. Drew, in procedures; repeat in 6 months to check for healing of ischemic colitis    COLONOSCOPY  05/01/2018    Dr. Drew, in procedures    DENTAL SURGERY      DILATION AND CURETTAGE OF UTERUS      HIP SURGERY Right 2007    LUMBAR PUNCTURE N/A 6/5/2023    Procedure: LUMBAR PUNCTURE;  Surgeon: Rangel Perez DO;  Location: [facility] OR 2ND FLR;  Service: Neurosurgery;  Laterality: N/A;    REPAIR, CSF LEAK, CRANIUM, MIDDLE FOSSA APPROACH Left 6/5/2023    Procedure: REPAIR, CSF LEAK, CRANIUM, MIDDLE FOSSA APPROACH;  Surgeon: Gasper Griffith MD;  Location: [facility] OR 2ND FLR;  Service: ENT;  Laterality: Left;    REPAIR, CSF LEAK, CRANIUM, MIDDLE FOSSA APPROACH Left 6/5/2023    Procedure: REPAIR, CSF LEAK, CRANIUM, MIDDLE FOSSA APPROACH;  Surgeon: Rangel Perez DO;  Location: [facility] OR 2ND FLR;  Service: Neurosurgery;  Laterality: Left;    TONSILLECTOMY      UPPER GASTROINTESTINAL ENDOSCOPY  04/24/2018    Dr. Drew, in media    WRIST SURGERY Left 01/2014       Time Tracking:     OT Date of Treatment: 06/09/23  OT Start Time: 1015  OT Stop Time: 1039  OT Total Time (min): 24 min    Billable Minutes:Evaluation 10  Self Care/Home Management 14    6/9/2023

## 2023-06-09 NOTE — PT/OT/SLP EVAL
Speech Language Pathology Evaluation  Bedside Swallow    Patient Name:  Tona Zafar   MRN:  [id number]  Admitting Diagnosis: Acute encephalopathy    Recommendations:                 General Recommendations:  Dysphagia therapy  Diet recommendations:  Mechanical soft, Thin   Aspiration Precautions: Small bites/sips and Standard aspiration precautions   General Precautions: Standard, aphasia, aspiration, fall  Communication strategies:  provide increased time to answer    Assessment:     Tona Zafar is a 68 y.o. female with an SLP diagnosis of Dysphagia.      History:     Past Medical History:   Diagnosis Date    Abnormal bone density screening     Colon polyps     Degenerative joint disease 02/05/2007    Diverticulosis     Gastroparesis 2018    History of chicken pox     History of depression 02/05/2007    Ischemic colitis 2020    Dr. Drew    Lumbar disc disease        Past Surgical History:   Procedure Laterality Date    APPENDECTOMY      AUGMENTATION OF BREAST      BREAST SURGERY Bilateral 2005    Implants    CHOLECYSTECTOMY  06/2012    COLONOSCOPY  05/12/2020    Dr. Drew, in procedures; repeat in 6 months to check for healing of ischemic colitis    COLONOSCOPY  05/01/2018    Dr. Drew, in procedures    DENTAL SURGERY      DILATION AND CURETTAGE OF UTERUS      HIP SURGERY Right 2007    LUMBAR PUNCTURE N/A 6/5/2023    Procedure: LUMBAR PUNCTURE;  Surgeon: Rangel Perez DO;  Location: [facility] OR [address] [place] [address];  Service: Neurosurgery;  Laterality: N/A;    REPAIR, CSF LEAK, CRANIUM, MIDDLE FOSSA APPROACH Left 6/5/2023    Procedure: REPAIR, CSF LEAK, CRANIUM, MIDDLE FOSSA APPROACH;  Surgeon: Gasper Griffith MD;  Location: [facility] OR [address] [place] [address];  Service: ENT;  Laterality: Left;    REPAIR, CSF LEAK, CRANIUM, MIDDLE FOSSA APPROACH Left 6/5/2023    Procedure: REPAIR, CSF LEAK, CRANIUM, MIDDLE FOSSA APPROACH;  Surgeon: Rangel Perez DO;  Location: [facility] OR [address] [place] [address];  Service: Neurosurgery;  Laterality: Left;    TONSILLECTOMY       UPPER GASTROINTESTINAL ENDOSCOPY  04/24/2018    Dr. Drew, in media    WRIST SURGERY Left 01/2014       Social History: Patient lives with spouse.    Chest X-Rays: 6/8 Question trace central vascular congestion with patchy opacity in the right lung base favored to represent atelectasis given volume loss.  Superimposed infection is not completely excluded.       Prior diet: regular/thin.      Subjective     Awake/alert  Family at bedside    Pain/Comfort:  Pain Rating 1: 0/10  Pain Rating Post-Intervention 1: 0/10    Respiratory Status: Room air    Objective:     Oral Musculature Evaluation  Oral Musculature: general weakness  Dentition: upper dentures, scattered dentition  Oral Labial Strength and Mobility: WFL  Lingual Strength and Mobility: WFL  Voice Prior to PO Intake: clear    Bedside Swallow Eval:   Consistencies Assessed:  Thin liquids x4 oz straw  Puree x2  Solids x1/4 todd cracker      Oral Phase:   WFL  Prolonged mastication, pt able to masticate solids with dentures    Pharyngeal Phase:   no overt clinical signs/symptoms of aspiration        Goals:   Multidisciplinary Problems       SLP Goals          Problem: SLP    Goal Priority Disciplines Outcome   SLP Goal     SLP    Description: Speech Language Pathology Goals  Goals expected to be met by 6/16    1. Pt will participate in ongoing swallow assessment   2. Pt will participate in speech language cognitive eval                              Plan:     Patient to be seen:  4 x/week   Plan of Care reviewed with:  patient, spouse   SLP Follow-Up:  Yes       Discharge recommendations:    TBD      Time Tracking:     SLP Treatment Date:   06/09/23  Speech Start Time:  1020  Speech Stop Time:  1032     Speech Total Time (min):  12 min    Billable Minutes: Eval Swallow and Oral Function 12    06/09/2023

## 2023-06-09 NOTE — CONSULTS
Inpatient consult to Physical Medicine Rehab  Consult performed by: Linda Rutherford NP  Consult ordered by: Marek Macdonald MD  Reason for consult: Assess rehab needs    Reviewed patient history and current admission.  Rehab team following.  Full consult to follow.    HAL Eller, FNP-C  Physical Medicine & Rehabilitation   06/09/2023

## 2023-06-09 NOTE — PROGRESS NOTES
[facility] - Neuro Critical Care  Neurocritical Care  Progress Note    Admit Date: 6/8/2023  Service Date: 06/09/2023  Length of Stay: 1    Subjective:     Chief Complaint: Acute encephalopathy    History of Present Illness: 68 y.o. F with hx of arthritis, diverticulosis, ischemic colitis, gastroparesis, tegmen defect s/p L temporal craniotomy and defect repair with cement and intradural dura matrix inlay at the site of encephalocele on June 5, who presented to [facility] ED for AMS. Patient was discharge from the hospital 6/7 s/p craniotomy and tegmen defect repair. She was discharged with prescription for gabapentin, keppra, percocet, she also has indwelling Cedillo catheter and tamsulosin for urinary retention post-op. She subsequently presented to [facility] Emergency Department on June 8 with altered mental status.  Patient's  noted that the patient was weak and could not stand on the way home from the hospital.  At 6:00 p.m. he gave her gabapentin and oxycodone.  He gave a subsequent dose at 2:00 a.m..  At 6:00 a.m. he found her unresponsive and sweating.  EMS was contacted.  O2 sats initially were in the low 50% range.  She received 4 mg of Narcan resulting in agitation.  On exam she was somnolent and would arouse to physical stimulus.  She would follow basic commands such as squeezing fingers when she was awake.  GCS was noted to be around 10.  She was initially hypotensive requiring Levophed, . In the emergency department she received LR and Narcan.  After the Narcan, she was disoriented but able to say her name and recognize family.  During her stay she can became somnolent.  She again responded to Narcan.  [facility] ED is placed her on a Narcan infusion. She was found to have acute liver failure, KAROLINA, elevatedt troponin and UTI. On broad spectrum abx.       CT head had postsurgical changes of the left lateral calvarium with decreasing pneumocephalus.  No evidence for acute intracranial  abnormality.  Complete opacification of the left mastoid air cells as well as the left middle ear.       [place] [facility] [place]       Hospital Course: 06/09/2023 improving mentation and multiorgan failure        Objective:     Vitals:  Pulse: 72  Rhythm: normal sinus rhythm  BP: 128/62  MAP (mmHg): 89  Resp: (!) 31  SpO2: 97 %    Temp  Min: 97.5 °F (36.4 °C)  Max: 99.8 °F (37.7 °C)  Pulse  Min: 70  Max: 87  BP  Min: 99/71  Max: 154/72  MAP (mmHg)  Min: 85  Max: 104  Resp  Min: 15  Max: 35  ETCO2 (mmHg)  Min: 28 mmHg  Max: 28 mmHg  SpO2  Min: 91 %  Max: 97 %    06/08 0701 - 06/09 0700  In: 950 [I.V.:350]  Out: 150 [Urine:150]   Unmeasured Output  Stool Occurrence: 2        Physical Exam  Vital signs, lab studies, and imaging reviewed by me  Alert, oriented x3, intact concentration (spells WORLD backwards) cranial II-XII intact, sensation full, moves all extremities with full strength, no dysmetria  Warm and well perfused, regular rate and rhythm, no murmurs  No dependent edema  Breathing comfortably, breath sounds clear  Belly soft, nontender, no hepatosplenomegaly       Medications:  Continuoussodium chloride 0.9%, Last Rate: 75 mL/hr at 06/09/23 0051    ScheduledcefTRIAXone (ROCEPHIN) IVPB, 1 g, Q24H  lactulose, 15 g, Q6H    PRNhydrOXYzine HCL, 25 mg, TID PRN  labetalol, 10 mg, Q15 Min PRN  magnesium oxide, 800 mg, PRN  magnesium oxide, 800 mg, PRN  ondansetron, 4 mg, Q6H PRN  oxyCODONE, 2.5 mg, Q4H PRN  potassium bicarbonate, 35 mEq, PRN  potassium bicarbonate, 50 mEq, PRN  potassium bicarbonate, 60 mEq, PRN  potassium, sodium phosphates, 2 packet, PRN  potassium, sodium phosphates, 2 packet, PRN  potassium, sodium phosphates, 2 packet, PRN  sodium chloride 0.9%, 10 mL, PRN  vancomycin - pharmacy to dose, , pharmacy to manage frequency            Assessment/Plan:     Neuro  * Acute encephalopathy  Presented as transfer from [facility] after arrived encephalopathic there, initialy with unknown cause.  She arrived hypotensive and hypoxic, nstemi, shock liver, feroz, and acute cystits from enterococcus uti     Today;   Off pressors (no pe, acs, possibly septic but  rapid decline and improvement seems unlikely, suspect drug related)  trops downtrending, new wma on tte,   encephalopathy improving, off narcan gtt  shock liver, improving (us unrevealing)  Feroz, non oliguric, good solute clearance  cth with possible L temporal collection/infarct, unlikely to have caused shock    -q4 neuro checks  -pt/ot, oob, slp  -cont vanc for enterococcus uti, awaiting c/s  -hold on ac/ap, consider bb if remains hd stable  -appreciate consultant input      ENT  Tegmen defect of base of skull  S/p tegmentum defect repair on 6/6 wit NSGY  Step down and discharge on 6/7 by OWEN WEAVER consulted after transfer to [facility]  Incision left temple is C/D/I, and no fresh draiange noted to the left ear, some dried flecks of blood.    Cardiac/Vascular  NSTEMI (non-ST elevated myocardial infarction)  See elevated troponin          The patient is being Prophylaxed for:  Venous Thromboembolism with: Mechanical  Stress Ulcer with: Not Applicable   Ventilator Pneumonia with: not applicable    Activity Orders          Turn patient starting at 06/09 0421    Elevate HOB starting at 06/08 2225    Diet NPO: NPO starting at 06/08 2225        Full Code     Critical condition in that Patient has a condition that poses threat to life and bodily function: as above     31 minutes of Critical care time was spent personally by me on the following activities: development of treatment plan with patient or surrogate and bedside caregivers, discussions with consultants, evaluation of patient's response to treatment, examination of patient, ordering and performing treatments and interventions, ordering and review of laboratory studies, ordering and review of radiographic studies, pulse oximetry, antibiotic titration if applicable, vasopressor titration if applicable,  re-evaluation of patient's condition. This critical care time did not overlap with that of any other provider or involve time for any procedures. There is high probability for acute neurological change leading to clinical and possibly life-threatening deterioration requiring highest level of physician preparedness for urgent intervention.      Linda Salter MD  Neurocritical Care  [facility] - Neuro Critical Care

## 2023-06-09 NOTE — ASSESSMENT & PLAN NOTE
Presented to OSH with new KAROLINA, Crt 1.4, worsening to > 1.9 GFR 20's after transfer  Follow I/O, Follow UO  IVF started, bolus given on arrival and it appears none was given prior to transfer  Given worsening KAROLINA, Liver function and Cardiac enzymes, believe global hypoxic event

## 2023-06-10 LAB
ALBUMIN SERPL BCP-MCNC: 2.2 G/DL (ref 3.5–5.2)
ALBUMIN SERPL BCP-MCNC: 2.4 G/DL (ref 3.5–5.2)
ALBUMIN SERPL BCP-MCNC: 2.4 G/DL (ref 3.5–5.2)
ALBUMIN SERPL BCP-MCNC: 2.5 G/DL (ref 3.5–5.2)
ALP SERPL-CCNC: 109 U/L (ref 55–135)
ALP SERPL-CCNC: 109 U/L (ref 55–135)
ALP SERPL-CCNC: 118 U/L (ref 55–135)
ALP SERPL-CCNC: 118 U/L (ref 55–135)
ALP SERPL-CCNC: 119 U/L (ref 55–135)
ALP SERPL-CCNC: 119 U/L (ref 55–135)
ALP SERPL-CCNC: 122 U/L (ref 55–135)
ALT SERPL W/O P-5'-P-CCNC: 2272 U/L (ref 10–44)
ALT SERPL W/O P-5'-P-CCNC: 2272 U/L (ref 10–44)
ALT SERPL W/O P-5'-P-CCNC: 2329 U/L (ref 10–44)
ALT SERPL W/O P-5'-P-CCNC: 2329 U/L (ref 10–44)
ALT SERPL W/O P-5'-P-CCNC: 2800 U/L (ref 10–44)
ALT SERPL W/O P-5'-P-CCNC: 2800 U/L (ref 10–44)
ALT SERPL W/O P-5'-P-CCNC: 2859 U/L (ref 10–44)
ALT SERPL W/O P-5'-P-CCNC: 2938 U/L (ref 10–44)
ALT SERPL W/O P-5'-P-CCNC: 2938 U/L (ref 10–44)
AMMONIA PLAS-SCNC: 62 UMOL/L (ref 10–50)
ANION GAP SERPL CALC-SCNC: 10 MMOL/L (ref 8–16)
ANION GAP SERPL CALC-SCNC: 6 MMOL/L (ref 8–16)
ANION GAP SERPL CALC-SCNC: 8 MMOL/L (ref 8–16)
ANION GAP SERPL CALC-SCNC: 8 MMOL/L (ref 8–16)
ANION GAP SERPL CALC-SCNC: 9 MMOL/L (ref 8–16)
AST SERPL-CCNC: 1531 U/L (ref 10–40)
AST SERPL-CCNC: 1531 U/L (ref 10–40)
AST SERPL-CCNC: 1836 U/L (ref 10–40)
AST SERPL-CCNC: 1836 U/L (ref 10–40)
AST SERPL-CCNC: 2757 U/L (ref 10–40)
AST SERPL-CCNC: 2757 U/L (ref 10–40)
AST SERPL-CCNC: 3206 U/L (ref 10–40)
AST SERPL-CCNC: 3313 U/L (ref 10–40)
AST SERPL-CCNC: 3313 U/L (ref 10–40)
BASOPHILS # BLD AUTO: 0.04 K/UL (ref 0–0.2)
BASOPHILS NFR BLD: 0.8 % (ref 0–1.9)
BILIRUB DIRECT SERPL-MCNC: 0.2 MG/DL (ref 0.1–0.3)
BILIRUB SERPL-MCNC: 0.3 MG/DL (ref 0.1–1)
BILIRUB SERPL-MCNC: 0.4 MG/DL (ref 0.1–1)
BUN SERPL-MCNC: 33 MG/DL (ref 8–23)
BUN SERPL-MCNC: 35 MG/DL (ref 8–23)
BUN SERPL-MCNC: 37 MG/DL (ref 8–23)
CALCIUM SERPL-MCNC: 7.6 MG/DL (ref 8.7–10.5)
CALCIUM SERPL-MCNC: 7.8 MG/DL (ref 8.7–10.5)
CALCIUM SERPL-MCNC: 8.1 MG/DL (ref 8.7–10.5)
CHLORIDE SERPL-SCNC: 110 MMOL/L (ref 95–110)
CHLORIDE SERPL-SCNC: 111 MMOL/L (ref 95–110)
CHLORIDE SERPL-SCNC: 111 MMOL/L (ref 95–110)
CHLORIDE SERPL-SCNC: 113 MMOL/L (ref 95–110)
CHLORIDE SERPL-SCNC: 114 MMOL/L (ref 95–110)
CK SERPL-CCNC: 661 U/L (ref 20–180)
CO2 SERPL-SCNC: 18 MMOL/L (ref 23–29)
CO2 SERPL-SCNC: 19 MMOL/L (ref 23–29)
CO2 SERPL-SCNC: 22 MMOL/L (ref 23–29)
CO2 SERPL-SCNC: 23 MMOL/L (ref 23–29)
CO2 SERPL-SCNC: 23 MMOL/L (ref 23–29)
CREAT SERPL-MCNC: 3.3 MG/DL (ref 0.5–1.4)
CREAT SERPL-MCNC: 3.5 MG/DL (ref 0.5–1.4)
CREAT SERPL-MCNC: 3.5 MG/DL (ref 0.5–1.4)
CREAT SERPL-MCNC: 3.6 MG/DL (ref 0.5–1.4)
CREAT SERPL-MCNC: 3.7 MG/DL (ref 0.5–1.4)
DIFFERENTIAL METHOD: ABNORMAL
EOSINOPHIL # BLD AUTO: 0.1 K/UL (ref 0–0.5)
EOSINOPHIL NFR BLD: 2.2 % (ref 0–8)
ERYTHROCYTE [DISTWIDTH] IN BLOOD BY AUTOMATED COUNT: 14 % (ref 11.5–14.5)
EST. GFR  (NO RACE VARIABLE): 12.7 ML/MIN/1.73 M^2
EST. GFR  (NO RACE VARIABLE): 13.1 ML/MIN/1.73 M^2
EST. GFR  (NO RACE VARIABLE): 13.6 ML/MIN/1.73 M^2
EST. GFR  (NO RACE VARIABLE): 13.6 ML/MIN/1.73 M^2
EST. GFR  (NO RACE VARIABLE): 14.6 ML/MIN/1.73 M^2
GLUCOSE SERPL-MCNC: 100 MG/DL (ref 70–110)
GLUCOSE SERPL-MCNC: 106 MG/DL (ref 70–110)
GLUCOSE SERPL-MCNC: 116 MG/DL (ref 70–110)
GLUCOSE SERPL-MCNC: 116 MG/DL (ref 70–110)
GLUCOSE SERPL-MCNC: 93 MG/DL (ref 70–110)
HCT VFR BLD AUTO: 31.5 % (ref 37–48.5)
HGB BLD-MCNC: 9.6 G/DL (ref 12–16)
IMM GRANULOCYTES # BLD AUTO: 0.07 K/UL (ref 0–0.04)
IMM GRANULOCYTES NFR BLD AUTO: 1.4 % (ref 0–0.5)
LYMPHOCYTES # BLD AUTO: 1.3 K/UL (ref 1–4.8)
LYMPHOCYTES NFR BLD: 25.1 % (ref 18–48)
MAGNESIUM SERPL-MCNC: 2.1 MG/DL (ref 1.6–2.6)
MCH RBC QN AUTO: 27.4 PG (ref 27–31)
MCHC RBC AUTO-ENTMCNC: 30.5 G/DL (ref 32–36)
MCV RBC AUTO: 90 FL (ref 82–98)
MONOCYTES # BLD AUTO: 0.4 K/UL (ref 0.3–1)
MONOCYTES NFR BLD: 7.5 % (ref 4–15)
NEUTROPHILS # BLD AUTO: 3.2 K/UL (ref 1.8–7.7)
NEUTROPHILS NFR BLD: 63 % (ref 38–73)
NRBC BLD-RTO: 1 /100 WBC
PHOSPHATE SERPL-MCNC: 2.9 MG/DL (ref 2.7–4.5)
PLATELET # BLD AUTO: 165 K/UL (ref 150–450)
PMV BLD AUTO: 10 FL (ref 9.2–12.9)
POTASSIUM SERPL-SCNC: 3.6 MMOL/L (ref 3.5–5.1)
POTASSIUM SERPL-SCNC: 3.8 MMOL/L (ref 3.5–5.1)
PROT SERPL-MCNC: 4.6 G/DL (ref 6–8.4)
PROT SERPL-MCNC: 4.6 G/DL (ref 6–8.4)
PROT SERPL-MCNC: 4.8 G/DL (ref 6–8.4)
PROT SERPL-MCNC: 4.8 G/DL (ref 6–8.4)
PROT SERPL-MCNC: 5.1 G/DL (ref 6–8.4)
PROT SERPL-MCNC: 5.1 G/DL (ref 6–8.4)
PROT SERPL-MCNC: 5.2 G/DL (ref 6–8.4)
PROT SERPL-MCNC: 5.3 G/DL (ref 6–8.4)
PROT SERPL-MCNC: 5.3 G/DL (ref 6–8.4)
RBC # BLD AUTO: 3.51 M/UL (ref 4–5.4)
SODIUM SERPL-SCNC: 139 MMOL/L (ref 136–145)
SODIUM SERPL-SCNC: 140 MMOL/L (ref 136–145)
SODIUM SERPL-SCNC: 141 MMOL/L (ref 136–145)
SODIUM SERPL-SCNC: 142 MMOL/L (ref 136–145)
SODIUM SERPL-SCNC: 143 MMOL/L (ref 136–145)
VANCOMYCIN SERPL-MCNC: 17.1 UG/ML
WBC # BLD AUTO: 5.05 K/UL (ref 3.9–12.7)

## 2023-06-10 PROCEDURE — 63600175 PHARM REV CODE 636 W HCPCS: Performed by: NURSE PRACTITIONER

## 2023-06-10 PROCEDURE — 63600175 PHARM REV CODE 636 W HCPCS: Performed by: PSYCHIATRY & NEUROLOGY

## 2023-06-10 PROCEDURE — 82550 ASSAY OF CK (CPK): CPT | Performed by: NURSE PRACTITIONER

## 2023-06-10 PROCEDURE — 25000003 PHARM REV CODE 250: Performed by: NURSE PRACTITIONER

## 2023-06-10 PROCEDURE — 85025 COMPLETE CBC W/AUTO DIFF WBC: CPT | Performed by: EMERGENCY MEDICINE

## 2023-06-10 PROCEDURE — 51798 US URINE CAPACITY MEASURE: CPT

## 2023-06-10 PROCEDURE — 99900035 HC TECH TIME PER 15 MIN (STAT)

## 2023-06-10 PROCEDURE — 80076 HEPATIC FUNCTION PANEL: CPT | Performed by: PSYCHIATRY & NEUROLOGY

## 2023-06-10 PROCEDURE — 80053 COMPREHEN METABOLIC PANEL: CPT | Mod: 91 | Performed by: NURSE PRACTITIONER

## 2023-06-10 PROCEDURE — 80202 ASSAY OF VANCOMYCIN: CPT | Performed by: PSYCHIATRY & NEUROLOGY

## 2023-06-10 PROCEDURE — 27000221 HC OXYGEN, UP TO 24 HOURS

## 2023-06-10 PROCEDURE — 94761 N-INVAS EAR/PLS OXIMETRY MLT: CPT

## 2023-06-10 PROCEDURE — 51701 INSERT BLADDER CATHETER: CPT

## 2023-06-10 PROCEDURE — 84100 ASSAY OF PHOSPHORUS: CPT | Performed by: EMERGENCY MEDICINE

## 2023-06-10 PROCEDURE — 99233 PR SUBSEQUENT HOSPITAL CARE,LEVL III: ICD-10-PCS | Mod: FS,,, | Performed by: PSYCHIATRY & NEUROLOGY

## 2023-06-10 PROCEDURE — 80053 COMPREHEN METABOLIC PANEL: CPT | Mod: 91 | Performed by: EMERGENCY MEDICINE

## 2023-06-10 PROCEDURE — 25000003 PHARM REV CODE 250: Performed by: PSYCHIATRY & NEUROLOGY

## 2023-06-10 PROCEDURE — 99233 SBSQ HOSP IP/OBS HIGH 50: CPT | Mod: FS,,, | Performed by: PSYCHIATRY & NEUROLOGY

## 2023-06-10 PROCEDURE — 83735 ASSAY OF MAGNESIUM: CPT | Performed by: EMERGENCY MEDICINE

## 2023-06-10 PROCEDURE — 25000242 PHARM REV CODE 250 ALT 637 W/ HCPCS: Performed by: NURSE PRACTITIONER

## 2023-06-10 PROCEDURE — 82140 ASSAY OF AMMONIA: CPT | Performed by: NURSE PRACTITIONER

## 2023-06-10 PROCEDURE — 20000000 HC ICU ROOM

## 2023-06-10 RX ORDER — AMOXICILLIN 500 MG/1
500 CAPSULE ORAL EVERY 12 HOURS
Status: DISCONTINUED | OUTPATIENT
Start: 2023-06-10 | End: 2023-06-10

## 2023-06-10 RX ORDER — SODIUM CHLORIDE, SODIUM LACTATE, POTASSIUM CHLORIDE, CALCIUM CHLORIDE 600; 310; 30; 20 MG/100ML; MG/100ML; MG/100ML; MG/100ML
INJECTION, SOLUTION INTRAVENOUS CONTINUOUS
Status: DISCONTINUED | OUTPATIENT
Start: 2023-06-10 | End: 2023-06-13

## 2023-06-10 RX ADMIN — OXYCODONE HYDROCHLORIDE 2.5 MG: 5 SOLUTION ORAL at 10:06

## 2023-06-10 RX ADMIN — OXYCODONE HYDROCHLORIDE 2.5 MG: 5 SOLUTION ORAL at 12:06

## 2023-06-10 RX ADMIN — SODIUM CHLORIDE, POTASSIUM CHLORIDE, SODIUM LACTATE AND CALCIUM CHLORIDE 500 ML: 600; 310; 30; 20 INJECTION, SOLUTION INTRAVENOUS at 02:06

## 2023-06-10 RX ADMIN — ONDANSETRON 4 MG: 2 INJECTION INTRAMUSCULAR; INTRAVENOUS at 01:06

## 2023-06-10 RX ADMIN — LACTULOSE 15 G: 20 SOLUTION ORAL at 11:06

## 2023-06-10 RX ADMIN — SODIUM CHLORIDE, POTASSIUM CHLORIDE, SODIUM LACTATE AND CALCIUM CHLORIDE 1000 ML: 600; 310; 30; 20 INJECTION, SOLUTION INTRAVENOUS at 09:06

## 2023-06-10 RX ADMIN — LACTULOSE 15 G: 20 SOLUTION ORAL at 12:06

## 2023-06-10 RX ADMIN — CEFTRIAXONE 1 G: 1 INJECTION, POWDER, FOR SOLUTION INTRAMUSCULAR; INTRAVENOUS at 03:06

## 2023-06-10 RX ADMIN — OXYCODONE HYDROCHLORIDE 2.5 MG: 5 SOLUTION ORAL at 02:06

## 2023-06-10 RX ADMIN — VANCOMYCIN HYDROCHLORIDE 500 MG: 500 INJECTION, POWDER, LYOPHILIZED, FOR SOLUTION INTRAVENOUS at 06:06

## 2023-06-10 RX ADMIN — AMOXICILLIN 500 MG: 500 CAPSULE ORAL at 09:06

## 2023-06-10 RX ADMIN — SILODOSIN 4 MG: 4 CAPSULE ORAL at 09:06

## 2023-06-10 RX ADMIN — SODIUM CHLORIDE, POTASSIUM CHLORIDE, SODIUM LACTATE AND CALCIUM CHLORIDE: 600; 310; 30; 20 INJECTION, SOLUTION INTRAVENOUS at 08:06

## 2023-06-10 RX ADMIN — LACTULOSE 15 G: 20 SOLUTION ORAL at 05:06

## 2023-06-10 NOTE — PLAN OF CARE
[facility] Care Plan    POC reviewed with Tona ALEXANDRA Andrade and family at 0300. Pt verbalized understanding. Questions and concerns addressed. No acute events overnight. Pt progressing toward goals. Will continue to monitor. See below and flowsheets for full assessment and VS info.     Pt disoriented to time.  BP WDL.  PT vu and family at bedside vu.  Straight cath x1.        Is this a stroke patient? no    Neuro:  Wheeler Coma Scale  Best Eye Response: 4-->(E4) spontaneous  Best Motor Response: 6-->(M6) obeys commands  Best Verbal Response: 4-->(V4) confused  Wheeler Coma Scale Score: 14  Assessment Qualifiers: patient not sedated/intubated, no eye obstruction present  Pupil PERRLA: yes     24hr Temp:  [97.8 °F (36.6 °C)-98.6 °F (37 °C)]     CV:   Rhythm: normal sinus rhythm  BP goals:   SBP < 160  MAP > 65    Resp:           Plan: N/A    GI/:     Diet/Nutrition Received: mechanical/dental soft  Last Bowel Movement: 06/09/23  Voiding Characteristics:  (urinary retention)    Intake/Output Summary (Last 24 hours) at 6/10/2023 0531  Last data filed at 6/10/2023 0438  Gross per 24 hour   Intake 2868.34 ml   Output 450 ml   Net 2418.34 ml     Unmeasured Output  Stool Occurrence: 1    Labs/Accuchecks:  Recent Labs   Lab 06/10/23  0022   WBC 5.05   RBC 3.51*   HGB 9.6*   HCT 31.5*         Recent Labs   Lab 06/10/23  0022     141   K 3.6  3.6   CO2 22*  23   *  110   BUN 35*  35*   CREATININE 3.6*  3.5*   ALKPHOS 122  119  119   ALT 2,859*  2,938*  2,938*   AST 3,206*  3,313*  3,313*   BILITOT 0.4  0.4  0.4      Recent Labs   Lab 06/09/23  0147   INR 1.2   APTT 21.9      Recent Labs   Lab 06/09/23  1246 06/10/23  0022   CPK  --  661*   TROPONINI 1.527*  --        Electrolytes: N/A - electrolytes WDL  Accuchecks: none    Gtts:   sodium chloride 0.9% 75 mL/hr at 06/10/23 0438       LDA/Wounds:  Lines/Drains/Airways       Peripheral Intravenous Line  Duration                  Peripheral IV - Single  Lumen 20 G Posterior;Right Hand -- days         Peripheral IV - Single Lumen 06/08/23 0854 18 G Right Antecubital 1 day         Peripheral IV - Single Lumen 06/08/23 1317 20 G Left Antecubital 1 day                  Wounds: Yes  Wound care consulted: No   Problem: Renal Function Impairment (Acute Kidney Injury/Impairment)  Goal: Effective Renal Function  Outcome: Ongoing, Progressing  Intervention: Monitor and Support Renal Function  Flowsheets (Taken 6/10/2023 0529)  Stabilization Measures:   provider notified   fluid resuscitation initiated  Medication Review/Management: medications reviewed     Problem: Adjustment to Illness (Delirium)  Goal: Optimal Coping  Outcome: Ongoing, Progressing  Intervention: Optimize Psychosocial Adjustment to Delirium  Flowsheets (Taken 6/10/2023 0529)  Supportive Measures:   active listening utilized   goal-setting facilitated   relaxation techniques promoted  Family/Support System Care:   caregiver stress acknowledged   involvement promoted

## 2023-06-10 NOTE — PROGRESS NOTES
Pharmacokinetic Assessment: IV Vancomycin    Assessment/Plan:    - Random level is therapeutic at 17.1 mcg/mL  - Goal trough  10-15  mcg/mL for enterococcus UTI  - KAROLINA w/ minimal UOP documented (0.2 cc/kg/hr)  -  Give 500 mg x1. Redose based on level and renal function  - Collect random level on 6/11 at 1200    Drug levels (last 3 results):  Recent Labs   Lab Result Units 06/09/23  1246 06/10/23  1617   Vancomycin, Random ug/mL 16.1 17.1       Pharmacy will continue to follow and monitor vancomycin.    Please contact pharmacy at extension [phone] for questions regarding this assessment.      Kitty Tilley, PharmD, BCCCP  Neurocritical Care Clinical Pharmacist  Ext. [phone]             Patient brief summary:  Tona Zafar is a 68 y.o. female initiated on antimicrobial therapy with IV Vancomycin for treatment of urinary tract infection      Drug Allergies:   Review of patient's allergies indicates:  No Known Allergies      Renal Function:   Estimated Creatinine Clearance: 18.1 mL/min (A) (based on SCr of 3.5 mg/dL (H)).,     Dialysis Method (if applicable):  N/A

## 2023-06-10 NOTE — SUBJECTIVE & OBJECTIVE
Interval History:  Liver enzymes trending down, Cr trending up, changed ABX to Vancomycin      Review of Systems   Constitutional: Negative.    HENT: Negative.     Eyes: Negative.    Respiratory: Negative.     Cardiovascular: Negative.    Gastrointestinal: Negative.    Endocrine: Negative.    Genitourinary: Negative.    Musculoskeletal: Negative.    Skin: Negative.    Neurological:  Positive for weakness.   Psychiatric/Behavioral:  Positive for confusion.      Objective:     Vitals:  Temp: 98 °F (36.7 °C)  Pulse: 76  Rhythm: normal sinus rhythm  BP: (!) 146/68  MAP (mmHg): 98  Resp: (!) 23  SpO2: 95 %    Temp  Min: 97.8 °F (36.6 °C)  Max: 98.6 °F (37 °C)  Pulse  Min: 70  Max: 96  BP  Min: 119/59  Max: 158/72  MAP (mmHg)  Min: 82  Max: 108  Resp  Min: 15  Max: 52  SpO2  Min: 91 %  Max: 98 %    06/09 0701 - 06/10 0700  In: 3023.3 [I.V.:1824.8]  Out: 450 [Urine:450]   Unmeasured Output  Stool Occurrence: 1        Physical Exam  Vitals and nursing note reviewed.   Constitutional:       Appearance: Normal appearance.   HENT:      Head: Normocephalic.      Nose: Nose normal.      Mouth/Throat:      Mouth: Mucous membranes are moist.      Pharynx: Oropharynx is clear.   Eyes:      Pupils: Pupils are equal, round, and reactive to light.   Cardiovascular:      Rate and Rhythm: Normal rate and regular rhythm.      Pulses: Normal pulses.      Heart sounds: Normal heart sounds.   Pulmonary:      Effort: Pulmonary effort is normal.      Breath sounds: Normal breath sounds.   Abdominal:      General: Bowel sounds are normal.      Palpations: Abdomen is soft.   Musculoskeletal:         General: Swelling present. Normal range of motion.   Skin:     General: Skin is warm and dry.      Capillary Refill: Capillary refill takes 2 to 3 seconds.   Neurological:      Mental Status: She is alert.      Comments: GCS 15  AAO X2-3  PERRL  MUNIZ to command and spontaneously  Sensation Intact X4     Unable to test orientation, language, memory,  judgment, insight, fund of knowledge, hearing, shoulder shrug, tongue protrusion, coordination, gait due to level of consciousness.       Medications:  Continuouslactated ringers, Last Rate: 75 mL/hr at 06/10/23 1001    Scheduledlactated ringers, 500 mL, Once  lactulose, 15 g, Q6H  silodosin, 4 mg, Daily    PRNhydrOXYzine HCL, 25 mg, TID PRN  labetalol, 10 mg, Q15 Min PRN  magnesium oxide, 800 mg, PRN  magnesium oxide, 800 mg, PRN  ondansetron, 4 mg, Q6H PRN  oxyCODONE, 2.5 mg, Q4H PRN  potassium bicarbonate, 35 mEq, PRN  potassium bicarbonate, 50 mEq, PRN  potassium bicarbonate, 60 mEq, PRN  potassium, sodium phosphates, 2 packet, PRN  potassium, sodium phosphates, 2 packet, PRN  potassium, sodium phosphates, 2 packet, PRN  sodium chloride 0.9%, 10 mL, PRN  vancomycin - pharmacy to dose, , pharmacy to manage frequency      Today I personally reviewed pertinent medications, lines/drains/airways, imaging, cardiology results, laboratory results, microbiology results, notably:    Diet  Diet Dysphagia Mechanical Soft (IDDSI Level 5) [facility]; Standard Tray  Diet Dysphagia Mechanical Soft (IDDSI Level 5) [facility]; Standard Tray

## 2023-06-10 NOTE — ASSESSMENT & PLAN NOTE
S/p tegmentum defect repair on 6/6 wit NSGY  Step down and discharge on 6/7 by NSGY    [place]GY [place] [facility] [place]  Incision left temple is C/D/I, and no fresh draiange noted to the left ear, some dried flecks of blood.

## 2023-06-10 NOTE — PLAN OF CARE
[facility] Care Plan    POC reviewed with Tona H Andrade and family at 1400. Pt verbalized understanding. Questions and concerns addressed. No acute events today. Pt progressing toward goals. Will continue to monitor. See below and flowsheets for full assessment and VS info.       Bladder Scan x2 Straight cath x1  Vanc added   Up to chair  Zofran given x1 for nausea   LR bolus 500ml x1 and 1000ml x1   Labs drawn q8 for liver and hepatic function   Oxy given x1 for HA       Is this a stroke patient? no    Neuro:  [place] Coma Scale  Best Eye Response: (P) 4-->(E4) spontaneous  Best Motor Response: (P) 6-->(M6) obeys commands  Best Verbal Response: (P) 4-->(V4) confused  [place] Coma Scale Score: (P) 14  Assessment Qualifiers: patient not sedated/intubated, no eye obstruction present  Pupil PERRLA: yes     24 hr Temp:  [97.8 °F (36.6 °C)-98.6 °F (37 °C)]     CV:   Rhythm: normal sinus rhythm  BP goals:   SBP < 160  MAP > 65    Resp:           Plan: N/A    GI/:     Diet/Nutrition Received: mechanical/dental soft  Last Bowel Movement: 06/10/23  Voiding Characteristics: other (see comments) (urinary retention)    Intake/Output Summary (Last 24 hours) at 6/10/2023 1739  Last data filed at 6/10/2023 1701  Gross per 24 hour   Intake 6272.36 ml   Output 900 ml   Net 5372.36 ml     Unmeasured Output  Stool Occurrence: 2    Labs/Accuchecks:  Recent Labs   Lab 06/10/23  0022   WBC 5.05   RBC 3.51*   HGB 9.6*   HCT 31.5*         Recent Labs   Lab 06/10/23  1617      K 3.6   CO2 19*   *   BUN 37*   CREATININE 3.5*   ALKPHOS 122  122   ALT 2,272*  2,272*   AST 1,531*  1,531*   BILITOT 0.3  0.3      Recent Labs   Lab 06/09/23  0147   INR 1.2   APTT 21.9      Recent Labs   Lab 06/09/23  1246 06/10/23  0022   CPK  --  661*   TROPONINI 1.527*  --        Electrolytes: Electrolytes replaced  Accuchecks: none    Gtts:   lactated ringers 75 mL/hr at 06/10/23 1701       LDA/Wounds:  Lines/Drains/Airways       Peripheral  Intravenous Line  Duration                  Peripheral IV - Single Lumen 20 G Posterior;Right Hand -- days         Peripheral IV - Single Lumen 06/08/23 0854 18 G Right Antecubital 2 days         Peripheral IV - Single Lumen 06/08/23 1317 20 G Left Antecubital 2 days                  Wounds: No  Wound care consulted: No

## 2023-06-10 NOTE — PLAN OF CARE
[facility] Care Plan    POC reviewed with Tona Zafar and family at 0300. Pt verbalized understanding. Questions and concerns addressed. No acute events overnight. Pt progressing toward goals. Will continue to monitor. See below and flowsheets for full assessment and VS info.           Is this a stroke patient? no    Neuro:  [place] Coma Scale  Best Eye Response: 4-->(E4) spontaneous  Best Motor Response: 6-->(M6) obeys commands  Best Verbal Response: 4-->(V4) confused  [place] Coma Scale Score: 14  Assessment Qualifiers: patient not sedated/intubated, no eye obstruction present  Pupil PERRLA: yes     24hr Temp:  [97.5 °F (36.4 °C)-99.8 °F (37.7 °C)]     CV:   Rhythm: normal sinus rhythm  BP goals:   SBP < 160  MAP > 65    Resp:           Plan: N/A    GI/:     Diet/Nutrition Received: mechanical/dental soft  Last Bowel Movement: 06/09/23  Voiding Characteristics:  (urinary retention)    Intake/Output Summary (Last 24 hours) at 6/9/2023 1956  Last data filed at 6/9/2023 1905  Gross per 24 hour   Intake 950 ml   Output 600 ml   Net 350 ml     Unmeasured Output  Stool Occurrence: 1    Labs/Accuchecks:  Recent Labs   Lab 06/09/23 0147   WBC 4.73   RBC 3.02*   HGB 8.2*   HCT 26.2*   *      Recent Labs   Lab 06/09/23  1903      K 3.8   CO2 22*   *   BUN 33*   CREATININE 3.2*   ALKPHOS 114  114   ALT 2,904*  2,904*   AST 3,748*  3,748*   BILITOT 0.4  0.4      Recent Labs   Lab 06/09/23 0147   INR 1.2   APTT 21.9      Recent Labs   Lab 06/08/23 2005 06/09/23 0147 06/09/23  1246   *  --   --    TROPONINI 2.900*   < > 1.527*    < > = values in this interval not displayed.       Electrolytes: Electrolytes replaced  Accuchecks: none    Gtts:   sodium chloride 0.9% 75 mL/hr at 06/09/23 0051       LDA/Wounds:  Lines/Drains/Airways       Peripheral Intravenous Line  Duration                  Peripheral IV - Single Lumen 20 G Posterior;Right Hand -- days         Peripheral IV - Single Lumen  06/08/23 0854 18 G Right Antecubital 1 day         Peripheral IV - Single Lumen 06/08/23 1317 20 G Left Antecubital 1 day                  Wounds: No  Wound care consulted: No

## 2023-06-10 NOTE — ASSESSMENT & PLAN NOTE
Presented at OSH with jamin increase in AST/ALT over pst 24hours,   AST/ALT normal on 6/7 and acutely increased to ASt 3569, ALT 2074 in 24 hrs  AST/ALT continuing to trend down  Bili is WNL  Ammonia trending downHepatology consulted  Liver US with doppler pending  Coags currently WNL  Will trend labs  -thoughts are global hypoxic event

## 2023-06-10 NOTE — PROGRESS NOTES
[facility] - Neuro Critical Care  Neurocritical Care  Progress Note    Admit Date: 6/8/2023  Service Date: 06/10/2023  Length of Stay: 2    Subjective:     Chief Complaint: Acute encephalopathy    History of Present Illness: 68 y.o. F with hx of arthritis, diverticulosis, ischemic colitis, gastroparesis, tegmen defect s/p L temporal craniotomy and defect repair with cement and intradural dura matrix inlay at the site of encephalocele on June 5, who presented to [facility] ED for AMS. Patient was discharge from the hospital 6/7 s/p craniotomy and tegmen defect repair. She was discharged with prescription for gabapentin, keppra, percocet, she also has indwelling Cedillo catheter and tamsulosin for urinary retention post-op. She subsequently presented to [facility] Emergency Department on June 8 with altered mental status.  Patient's  noted that the patient was weak and could not stand on the way home from the hospital.  At 6:00 p.m. he gave her gabapentin and oxycodone.  He gave a subsequent dose at 2:00 a.m..  At 6:00 a.m. he found her unresponsive and sweating.  EMS was contacted.  O2 sats initially were in the low 50% range.  She received 4 mg of Narcan resulting in agitation.  On exam she was somnolent and would arouse to physical stimulus.  She would follow basic commands such as squeezing fingers when she was awake.  GCS was noted to be around 10.  She was initially hypotensive requiring Levophed, . In the emergency department she received LR and Narcan.  After the Narcan, she was disoriented but able to say her name and recognize family.  During her stay she can became somnolent.  She again responded to Narcan.  [facility] ED is placed her on a Narcan infusion. She was found to have acute liver failure, KAROLINA, elevatedt troponin and UTI. On broad spectrum abx.       CT head had postsurgical changes of the left lateral calvarium with decreasing pneumocephalus.  No evidence for acute intracranial  abnormality.  Complete opacification of the left mastoid air cells as well as the left middle ear.       Transferred to [facility] since recent discharge.       Hospital Course: 06/09/2023 improving mentation and multiorgan failure  6/10/23: trend live enzymes        Interval History:  Liver enzymes trending down, Cr trending up, changed ABX to Vancomycin      Review of Systems   Constitutional: Negative.    HENT: Negative.     Eyes: Negative.    Respiratory: Negative.     Cardiovascular: Negative.    Gastrointestinal: Negative.    Endocrine: Negative.    Genitourinary: Negative.    Musculoskeletal: Negative.    Skin: Negative.    Neurological:  Positive for weakness.   Psychiatric/Behavioral:  Positive for confusion.      Objective:     Vitals:  Temp: 98 °F (36.7 °C)  Pulse: 76  Rhythm: normal sinus rhythm  BP: (!) 146/68  MAP (mmHg): 98  Resp: (!) 23  SpO2: 95 %    Temp  Min: 97.8 °F (36.6 °C)  Max: 98.6 °F (37 °C)  Pulse  Min: 70  Max: 96  BP  Min: 119/59  Max: 158/72  MAP (mmHg)  Min: 82  Max: 108  Resp  Min: 15  Max: 52  SpO2  Min: 91 %  Max: 98 %    06/09 0701 - 06/10 0700  In: 3023.3 [I.V.:1824.8]  Out: 450 [Urine:450]   Unmeasured Output  Stool Occurrence: 1        Physical Exam  Vitals and nursing note reviewed.   Constitutional:       Appearance: Normal appearance.   HENT:      Head: Normocephalic.      Nose: Nose normal.      Mouth/Throat:      Mouth: Mucous membranes are moist.      Pharynx: Oropharynx is clear.   Eyes:      Pupils: Pupils are equal, round, and reactive to light.   Cardiovascular:      Rate and Rhythm: Normal rate and regular rhythm.      Pulses: Normal pulses.      Heart sounds: Normal heart sounds.   Pulmonary:      Effort: Pulmonary effort is normal.      Breath sounds: Normal breath sounds.   Abdominal:      General: Bowel sounds are normal.      Palpations: Abdomen is soft.   Musculoskeletal:         General: Swelling present. Normal range of motion.   Skin:     General: Skin is warm and  dry.      Capillary Refill: Capillary refill takes 2 to 3 seconds.   Neurological:      Mental Status: She is alert.      Comments: GCS 15  AAO X2-3  PERRL  MUNIZ to command and spontaneously  Sensation Intact X4     Unable to test orientation, language, memory, judgment, insight, fund of knowledge, hearing, shoulder shrug, tongue protrusion, coordination, gait due to level of consciousness.       Medications:  Continuouslactated ringers, Last Rate: 75 mL/hr at 06/10/23 1001    Scheduledlactated ringers, 500 mL, Once  lactulose, 15 g, Q6H  silodosin, 4 mg, Daily    PRNhydrOXYzine HCL, 25 mg, TID PRN  labetalol, 10 mg, Q15 Min PRN  magnesium oxide, 800 mg, PRN  magnesium oxide, 800 mg, PRN  ondansetron, 4 mg, Q6H PRN  oxyCODONE, 2.5 mg, Q4H PRN  potassium bicarbonate, 35 mEq, PRN  potassium bicarbonate, 50 mEq, PRN  potassium bicarbonate, 60 mEq, PRN  potassium, sodium phosphates, 2 packet, PRN  potassium, sodium phosphates, 2 packet, PRN  potassium, sodium phosphates, 2 packet, PRN  sodium chloride 0.9%, 10 mL, PRN  vancomycin - pharmacy to dose, , pharmacy to manage frequency      Today I personally reviewed pertinent medications, lines/drains/airways, imaging, cardiology results, laboratory results, microbiology results, notably:    Diet  Diet Dysphagia Mechanical Soft (IDDSI Level 5) [facility]; Standard Tray  Diet Dysphagia Mechanical Soft (IDDSI Level 5) [facility]; Standard Tray          Assessment/Plan:     ENT  Tegmen defect of base of skull  S/p tegmentum defect repair on 6/6 wit NSGY  Step down and discharge on 6/7 by NSGY    NSGY consulted after transfer to [place]  Incision left temple is C/D/I, and no fresh draiange noted to the left ear, some dried flecks of blood.    Cardiac/Vascular  NSTEMI (non-ST elevated myocardial infarction)  See elevated troponin    Renal/  Acute cystitis  UTI present on admission,  UA (+) enterococcus  Started vancomycin      KAROLINA (acute kidney injury)  Presented to OSH  with new KAROLINA, Crt 1.4, worsening to > 1.9 GFR 20's after transfer  Follow I/O, Follow UO  IVF started, bolus given on arrival and it appears none was given prior to transfer  Given worsening KAROLINA, Liver function and Cardiac enzymes, believe global hypoxic event    GI  Acute liver failure  Presented at OSH with jamin increase in AST/ALT over pst 24hours,   AST/ALT normal on 6/7 and acutely increased to ASt 3569, ALT 2074 in 24 hrs  AST/ALT continuing to trend down  Bili is WNL  Ammonia trending downHepatology consulted  Liver US with doppler pending  Coags currently WNL  Will trend labs  -thoughts are global hypoxic event    Other  Overdose  inittial wakefulness improved with narcan at OSH, gtt was started and [place] [facility] [place]   Stopped shortly after arrival. Follow exam closely     Increased ammonia level  Acute liver inury/failure  Hepatology following  Lactulose started          The patient is being Prophylaxed for:  Venous Thromboembolism with: Mechanical or Chemical  Stress Ulcer with: Not Applicable   Ventilator Pneumonia with: not applicable    Activity Orders          Diet Dysphagia Mechanical Soft (IDDSI Level 5) [facility]; Standard Tray: Dysphagia 2 (Mechanical Soft Ground) starting at 06/09 1624    Turn patient starting at 06/09 0421    Elevate HOB starting at 06/08 2225        Full Code  Critical care time 45 mins  Saumya Field NP  Neurocritical Care  [facility] - Neuro Critical Care

## 2023-06-10 NOTE — CONSULTS
Therapy with vancomycin complete and/or consult discontinued by provider.  Pharmacy will sign off, please re-consult as needed.    Kitty Tilley, PharmD, BCCCP  Neurocritical Care Clinical Pharmacist  Ext. [phone]

## 2023-06-10 NOTE — ASSESSMENT & PLAN NOTE
inittial wakefulness improved with narcan at OSH, gtt was started and continued to tranfers to [facility] [place].   Stopped shortly after arrival. Follow exam closely

## 2023-06-11 LAB
ALBUMIN SERPL BCP-MCNC: 2.2 G/DL (ref 3.5–5.2)
ALBUMIN SERPL BCP-MCNC: 2.2 G/DL (ref 3.5–5.2)
ALBUMIN SERPL BCP-MCNC: 2.3 G/DL (ref 3.5–5.2)
ALBUMIN SERPL BCP-MCNC: 2.4 G/DL (ref 3.5–5.2)
ALBUMIN SERPL BCP-MCNC: 2.4 G/DL (ref 3.5–5.2)
ALP SERPL-CCNC: 119 U/L (ref 55–135)
ALP SERPL-CCNC: 119 U/L (ref 55–135)
ALP SERPL-CCNC: 123 U/L (ref 55–135)
ALP SERPL-CCNC: 124 U/L (ref 55–135)
ALP SERPL-CCNC: 124 U/L (ref 55–135)
ALP SERPL-CCNC: 133 U/L (ref 55–135)
ALP SERPL-CCNC: 133 U/L (ref 55–135)
ALT SERPL W/O P-5'-P-CCNC: 1605 U/L (ref 10–44)
ALT SERPL W/O P-5'-P-CCNC: 1605 U/L (ref 10–44)
ALT SERPL W/O P-5'-P-CCNC: 1731 U/L (ref 10–44)
ALT SERPL W/O P-5'-P-CCNC: 1731 U/L (ref 10–44)
ALT SERPL W/O P-5'-P-CCNC: 2042 U/L (ref 10–44)
ALT SERPL W/O P-5'-P-CCNC: 2057 U/L (ref 10–44)
ALT SERPL W/O P-5'-P-CCNC: 2057 U/L (ref 10–44)
AMMONIA PLAS-SCNC: 73 UMOL/L (ref 10–50)
ANION GAP SERPL CALC-SCNC: 10 MMOL/L (ref 8–16)
ANION GAP SERPL CALC-SCNC: 8 MMOL/L (ref 8–16)
ANION GAP SERPL CALC-SCNC: 9 MMOL/L (ref 8–16)
ANION GAP SERPL CALC-SCNC: 9 MMOL/L (ref 8–16)
AST SERPL-CCNC: 401 U/L (ref 10–40)
AST SERPL-CCNC: 401 U/L (ref 10–40)
AST SERPL-CCNC: 607 U/L (ref 10–40)
AST SERPL-CCNC: 607 U/L (ref 10–40)
AST SERPL-CCNC: 951 U/L (ref 10–40)
AST SERPL-CCNC: 961 U/L (ref 10–40)
AST SERPL-CCNC: 961 U/L (ref 10–40)
BACTERIA UR CULT: ABNORMAL
BASOPHILS # BLD AUTO: 0.05 K/UL (ref 0–0.2)
BASOPHILS NFR BLD: 0.8 % (ref 0–1.9)
BILIRUB DIRECT SERPL-MCNC: 0.2 MG/DL (ref 0.1–0.3)
BILIRUB DIRECT SERPL-MCNC: 0.2 MG/DL (ref 0.1–0.3)
BILIRUB DIRECT SERPL-MCNC: 0.3 MG/DL (ref 0.1–0.3)
BILIRUB SERPL-MCNC: 0.4 MG/DL (ref 0.1–1)
BILIRUB SERPL-MCNC: 0.5 MG/DL (ref 0.1–1)
BUN SERPL-MCNC: 36 MG/DL (ref 8–23)
BUN SERPL-MCNC: 37 MG/DL (ref 8–23)
BUN SERPL-MCNC: 37 MG/DL (ref 8–23)
BUN SERPL-MCNC: 38 MG/DL (ref 8–23)
CALCIUM SERPL-MCNC: 7.9 MG/DL (ref 8.7–10.5)
CALCIUM SERPL-MCNC: 7.9 MG/DL (ref 8.7–10.5)
CALCIUM SERPL-MCNC: 8 MG/DL (ref 8.7–10.5)
CALCIUM SERPL-MCNC: 8.2 MG/DL (ref 8.7–10.5)
CHLORIDE SERPL-SCNC: 111 MMOL/L (ref 95–110)
CHLORIDE SERPL-SCNC: 114 MMOL/L (ref 95–110)
CO2 SERPL-SCNC: 19 MMOL/L (ref 23–29)
CO2 SERPL-SCNC: 20 MMOL/L (ref 23–29)
CO2 SERPL-SCNC: 20 MMOL/L (ref 23–29)
CO2 SERPL-SCNC: 21 MMOL/L (ref 23–29)
CREAT SERPL-MCNC: 3.8 MG/DL (ref 0.5–1.4)
CREAT SERPL-MCNC: 4 MG/DL (ref 0.5–1.4)
CREAT SERPL-MCNC: 4 MG/DL (ref 0.5–1.4)
CREAT SERPL-MCNC: 4.1 MG/DL (ref 0.5–1.4)
DIFFERENTIAL METHOD: ABNORMAL
EOSINOPHIL # BLD AUTO: 0.2 K/UL (ref 0–0.5)
EOSINOPHIL NFR BLD: 3.6 % (ref 0–8)
ERYTHROCYTE [DISTWIDTH] IN BLOOD BY AUTOMATED COUNT: 14.2 % (ref 11.5–14.5)
EST. GFR  (NO RACE VARIABLE): 11.2 ML/MIN/1.73 M^2
EST. GFR  (NO RACE VARIABLE): 11.6 ML/MIN/1.73 M^2
EST. GFR  (NO RACE VARIABLE): 11.6 ML/MIN/1.73 M^2
EST. GFR  (NO RACE VARIABLE): 12.3 ML/MIN/1.73 M^2
GLUCOSE SERPL-MCNC: 103 MG/DL (ref 70–110)
GLUCOSE SERPL-MCNC: 103 MG/DL (ref 70–110)
GLUCOSE SERPL-MCNC: 124 MG/DL (ref 70–110)
GLUCOSE SERPL-MCNC: 89 MG/DL (ref 70–110)
HCT VFR BLD AUTO: 31 % (ref 37–48.5)
HGB BLD-MCNC: 9.8 G/DL (ref 12–16)
IMM GRANULOCYTES # BLD AUTO: 0.07 K/UL (ref 0–0.04)
IMM GRANULOCYTES NFR BLD AUTO: 1.2 % (ref 0–0.5)
INR PPP: 1.1 (ref 0.8–1.2)
LYMPHOCYTES # BLD AUTO: 1.1 K/UL (ref 1–4.8)
LYMPHOCYTES NFR BLD: 18.6 % (ref 18–48)
MAGNESIUM SERPL-MCNC: 2.1 MG/DL (ref 1.6–2.6)
MCH RBC QN AUTO: 27.8 PG (ref 27–31)
MCHC RBC AUTO-ENTMCNC: 31.6 G/DL (ref 32–36)
MCV RBC AUTO: 88 FL (ref 82–98)
MONOCYTES # BLD AUTO: 0.4 K/UL (ref 0.3–1)
MONOCYTES NFR BLD: 7.1 % (ref 4–15)
NEUTROPHILS # BLD AUTO: 4.2 K/UL (ref 1.8–7.7)
NEUTROPHILS NFR BLD: 68.7 % (ref 38–73)
NRBC BLD-RTO: 1 /100 WBC
PHOSPHATE SERPL-MCNC: 2.9 MG/DL (ref 2.7–4.5)
PLATELET # BLD AUTO: 175 K/UL (ref 150–450)
PMV BLD AUTO: 10 FL (ref 9.2–12.9)
POTASSIUM SERPL-SCNC: 3.5 MMOL/L (ref 3.5–5.1)
POTASSIUM SERPL-SCNC: 3.6 MMOL/L (ref 3.5–5.1)
POTASSIUM SERPL-SCNC: 3.6 MMOL/L (ref 3.5–5.1)
POTASSIUM SERPL-SCNC: 3.7 MMOL/L (ref 3.5–5.1)
PROT SERPL-MCNC: 4.7 G/DL (ref 6–8.4)
PROT SERPL-MCNC: 4.7 G/DL (ref 6–8.4)
PROT SERPL-MCNC: 4.8 G/DL (ref 6–8.4)
PROT SERPL-MCNC: 4.8 G/DL (ref 6–8.4)
PROT SERPL-MCNC: 4.9 G/DL (ref 6–8.4)
PROT SERPL-MCNC: 5.1 G/DL (ref 6–8.4)
PROT SERPL-MCNC: 5.1 G/DL (ref 6–8.4)
PROTHROMBIN TIME: 12.2 SEC (ref 9–12.5)
RBC # BLD AUTO: 3.52 M/UL (ref 4–5.4)
SODIUM SERPL-SCNC: 139 MMOL/L (ref 136–145)
SODIUM SERPL-SCNC: 141 MMOL/L (ref 136–145)
SODIUM SERPL-SCNC: 141 MMOL/L (ref 136–145)
SODIUM SERPL-SCNC: 142 MMOL/L (ref 136–145)
VANCOMYCIN SERPL-MCNC: 19.8 UG/ML
WBC # BLD AUTO: 6.06 K/UL (ref 3.9–12.7)

## 2023-06-11 PROCEDURE — 25000242 PHARM REV CODE 250 ALT 637 W/ HCPCS: Performed by: NURSE PRACTITIONER

## 2023-06-11 PROCEDURE — 94761 N-INVAS EAR/PLS OXIMETRY MLT: CPT

## 2023-06-11 PROCEDURE — 25000003 PHARM REV CODE 250: Performed by: NURSE PRACTITIONER

## 2023-06-11 PROCEDURE — 99233 PR SUBSEQUENT HOSPITAL CARE,LEVL III: ICD-10-PCS | Mod: FS,,, | Performed by: PSYCHIATRY & NEUROLOGY

## 2023-06-11 PROCEDURE — 80053 COMPREHEN METABOLIC PANEL: CPT | Mod: 91 | Performed by: NURSE PRACTITIONER

## 2023-06-11 PROCEDURE — 51701 INSERT BLADDER CATHETER: CPT

## 2023-06-11 PROCEDURE — 20600001 HC STEP DOWN PRIVATE ROOM

## 2023-06-11 PROCEDURE — 63600175 PHARM REV CODE 636 W HCPCS: Performed by: NURSE PRACTITIONER

## 2023-06-11 PROCEDURE — 80053 COMPREHEN METABOLIC PANEL: CPT | Performed by: EMERGENCY MEDICINE

## 2023-06-11 PROCEDURE — 84100 ASSAY OF PHOSPHORUS: CPT | Performed by: EMERGENCY MEDICINE

## 2023-06-11 PROCEDURE — 82140 ASSAY OF AMMONIA: CPT | Performed by: PSYCHIATRY & NEUROLOGY

## 2023-06-11 PROCEDURE — 99900035 HC TECH TIME PER 15 MIN (STAT)

## 2023-06-11 PROCEDURE — 99233 SBSQ HOSP IP/OBS HIGH 50: CPT | Mod: FS,,, | Performed by: PSYCHIATRY & NEUROLOGY

## 2023-06-11 PROCEDURE — 85025 COMPLETE CBC W/AUTO DIFF WBC: CPT | Performed by: PSYCHIATRY & NEUROLOGY

## 2023-06-11 PROCEDURE — 25000003 PHARM REV CODE 250: Performed by: EMERGENCY MEDICINE

## 2023-06-11 PROCEDURE — 51798 US URINE CAPACITY MEASURE: CPT

## 2023-06-11 PROCEDURE — 83735 ASSAY OF MAGNESIUM: CPT | Performed by: EMERGENCY MEDICINE

## 2023-06-11 PROCEDURE — 85610 PROTHROMBIN TIME: CPT | Performed by: PSYCHIATRY & NEUROLOGY

## 2023-06-11 PROCEDURE — 80076 HEPATIC FUNCTION PANEL: CPT | Performed by: PSYCHIATRY & NEUROLOGY

## 2023-06-11 PROCEDURE — 80076 HEPATIC FUNCTION PANEL: CPT | Mod: 91 | Performed by: PSYCHIATRY & NEUROLOGY

## 2023-06-11 PROCEDURE — 80202 ASSAY OF VANCOMYCIN: CPT | Performed by: PSYCHIATRY & NEUROLOGY

## 2023-06-11 PROCEDURE — 27000221 HC OXYGEN, UP TO 24 HOURS

## 2023-06-11 RX ORDER — AMOXICILLIN 500 MG/1
500 CAPSULE ORAL EVERY 12 HOURS
Status: COMPLETED | OUTPATIENT
Start: 2023-06-11 | End: 2023-06-13

## 2023-06-11 RX ORDER — HEPARIN SODIUM 5000 [USP'U]/ML
5000 INJECTION, SOLUTION INTRAVENOUS; SUBCUTANEOUS EVERY 8 HOURS
Status: DISCONTINUED | OUTPATIENT
Start: 2023-06-11 | End: 2023-06-15 | Stop reason: HOSPADM

## 2023-06-11 RX ADMIN — LACTULOSE 15 G: 20 SOLUTION ORAL at 11:06

## 2023-06-11 RX ADMIN — SODIUM CHLORIDE, POTASSIUM CHLORIDE, SODIUM LACTATE AND CALCIUM CHLORIDE: 600; 310; 30; 20 INJECTION, SOLUTION INTRAVENOUS at 06:06

## 2023-06-11 RX ADMIN — HEPARIN SODIUM 5000 UNITS: 5000 INJECTION INTRAVENOUS; SUBCUTANEOUS at 01:06

## 2023-06-11 RX ADMIN — AMOXICILLIN 500 MG: 500 CAPSULE ORAL at 10:06

## 2023-06-11 RX ADMIN — LACTULOSE 15 G: 20 SOLUTION ORAL at 06:06

## 2023-06-11 RX ADMIN — SODIUM CHLORIDE, POTASSIUM CHLORIDE, SODIUM LACTATE AND CALCIUM CHLORIDE 1000 ML: 600; 310; 30; 20 INJECTION, SOLUTION INTRAVENOUS at 09:06

## 2023-06-11 RX ADMIN — SODIUM CHLORIDE, POTASSIUM CHLORIDE, SODIUM LACTATE AND CALCIUM CHLORIDE: 600; 310; 30; 20 INJECTION, SOLUTION INTRAVENOUS at 10:06

## 2023-06-11 RX ADMIN — HEPARIN SODIUM 5000 UNITS: 5000 INJECTION INTRAVENOUS; SUBCUTANEOUS at 10:06

## 2023-06-11 RX ADMIN — OXYCODONE HYDROCHLORIDE 2.5 MG: 5 SOLUTION ORAL at 07:06

## 2023-06-11 RX ADMIN — OXYCODONE HYDROCHLORIDE 2.5 MG: 5 SOLUTION ORAL at 05:06

## 2023-06-11 RX ADMIN — LACTULOSE 15 G: 20 SOLUTION ORAL at 05:06

## 2023-06-11 RX ADMIN — SILODOSIN 4 MG: 4 CAPSULE ORAL at 09:06

## 2023-06-11 RX ADMIN — LACTULOSE 15 G: 20 SOLUTION ORAL at 12:06

## 2023-06-11 RX ADMIN — LABETALOL HYDROCHLORIDE 10 MG: 5 INJECTION, SOLUTION INTRAVENOUS at 11:06

## 2023-06-11 NOTE — ASSESSMENT & PLAN NOTE
L/m to c/b Presented at OSH with jamin increase in AST/ALT over pst 24hours,   AST/ALT normal on 6/7 and acutely increased to ASt 3569, ALT 2074 in 24 hrs  AST/ALT continuing to trend down  Bili is WNL  Ammonia trending downHepatology consulted  Liver US with doppler pending  Coags currently WNL  AST/ALT trending down  -thoughts are global hypoxic event

## 2023-06-11 NOTE — PLAN OF CARE
[facility] Care Plan    POC reviewed with Tona Zafar and family at 0300. Pt verbalized understanding. Questions and concerns addressed. No acute events overnight. Pt progressing toward goals. Will continue to monitor. See below and flowsheets for full assessment and VS info.     -Pt vu and alert and oriented x4  -Pt  vu and at bedside   -Straight cath x1        Is this a stroke patient? no    Neuro:  Mily Coma Scale  Best Eye Response: 4-->(E4) spontaneous  Best Motor Response: 6-->(M6) obeys commands  Best Verbal Response: 4-->(V4) confused  [place] Coma Scale Score: 14  Assessment Qualifiers: patient not sedated/intubated  Pupil PERRLA: yes     24hr Temp:  [97.7 °F (36.5 °C)-98.2 °F (36.8 °C)]     CV:   Rhythm: normal sinus rhythm  BP goals:   SBP < 160  MAP > 65    Resp:           Plan: N/A    GI/:     Diet/Nutrition Received: mechanical/dental soft  Last Bowel Movement: 06/11/23  Voiding Characteristics: other (see comments) (urinary retention)    Intake/Output Summary (Last 24 hours) at 6/11/2023 0726  Last data filed at 6/11/2023 0557  Gross per 24 hour   Intake 3900.32 ml   Output 830 ml   Net 3070.32 ml     Unmeasured Output  Stool Occurrence: 1    Labs/Accuchecks:  Recent Labs   Lab 06/11/23  0501   WBC 6.06   RBC 3.52*   HGB 9.8*   HCT 31.0*         Recent Labs   Lab 06/11/23  0018     141   K 3.6  3.6   CO2 19*  21*   *  111*   BUN 36*  37*   CREATININE 4.0*  4.0*   ALKPHOS 123  124  124   ALT 2,042*  2,057*  2,057*   *  961*  961*   BILITOT 0.4  0.4  0.4      Recent Labs   Lab 06/09/23  0147 06/11/23  0127   INR 1.2 1.1   APTT 21.9  --       Recent Labs   Lab 06/09/23  1246 06/10/23  0022   CPK  --  661*   TROPONINI 1.527*  --        Electrolytes: N/A - electrolytes WDL  Accuchecks: none    Gtts:   lactated ringers 75 mL/hr at 06/11/23 0644       LDA/Wounds:  Lines/Drains/Airways       Peripheral Intravenous Line  Duration                  Peripheral IV  - Single Lumen 20 G Posterior;Right Hand -- days         Peripheral IV - Single Lumen 06/08/23 0854 18 G Right Antecubital 2 days         Peripheral IV - Single Lumen 06/08/23 1317 20 G Left Antecubital 2 days                  Wounds: Yes  Wound care consulted: No   Problem: Fluid and Electrolyte Imbalance (Acute Kidney Injury/Impairment)  Goal: Fluid and Electrolyte Balance  Outcome: Ongoing, Progressing  Intervention: Monitor and Manage Fluid and Electrolyte Balance  Flowsheets (Taken 6/11/2023 0723)  Fluid/Electrolyte Management:   fluids provided   intravenous fluids adjusted     Problem: Renal Function Impairment (Acute Kidney Injury/Impairment)  Goal: Effective Renal Function  Outcome: Ongoing, Progressing     Problem: Attention and Thought Clarity Impairment (Delirium)  Goal: Improved Attention and Thought Clarity  Outcome: Ongoing, Progressing  Intervention: Maximize Cognitive Function  Flowsheets (Taken 6/11/2023 0723)  Reorientation Measures:   calendar in view   clock in view  Sensory Stimulation Regulation:   care clustered   visual stimulation provided   quiet environment promoted   television on

## 2023-06-11 NOTE — PROGRESS NOTES
[facility]i Cuellar - Neuro Critical Care  Neurocritical Care  Progress Note    Admit Date: 6/8/2023  Service Date: 06/11/2023  Length of Stay: 3    Subjective:     Chief Complaint: Acute encephalopathy    History of Present Illness: 68 y.o. F with hx of arthritis, diverticulosis, ischemic colitis, gastroparesis, tegmen defect s/p L temporal craniotomy and defect repair with cement and intradural dura matrix inlay at the site of encephalocele on June 5, who presented to [facility] ED for AMS. Patient was discharge from the hospital 6/7 s/p craniotomy and tegmen defect repair. She was discharged with prescription for gabapentin, keppra, percocet, she also has indwelling Cedillo catheter and tamsulosin for urinary retention post-op. She subsequently presented to [facility] Emergency Department on June 8 with altered mental status.  Patient's  noted that the patient was weak and could not stand on the way home from the hospital.  At 6:00 p.m. he gave her gabapentin and oxycodone.  He gave a subsequent dose at 2:00 a.m..  At 6:00 a.m. he found her unresponsive and sweating.  EMS was contacted.  O2 sats initially were in the low 50% range.  She received 4 mg of Narcan resulting in agitation.  On exam she was somnolent and would arouse to physical stimulus.  She would follow basic commands such as squeezing fingers when she was awake.  GCS was noted to be around 10.  She was initially hypotensive requiring Levophed, . In the emergency department she received LR and Narcan.  After the Narcan, she was disoriented but able to say her name and recognize family.  During her stay she can became somnolent.  She again responded to Narcan.  OSH ED is placed her on a Narcan infusion. She was found to have acute liver failure, KAROLINA, elevatedt troponin and UTI. On broad spectrum abx.       CT head had postsurgical changes of the left lateral calvarium with decreasing pneumocephalus.  No evidence for acute intracranial  abnormality.  Complete opacification of the left mastoid air cells as well as the left middle ear.       Transferred to [facility] since recent discharge.       Hospital Course: 06/09/2023 improving mentation and multiorgan failure  6/10/23: trend live enzymes  6/11/23: ok to step down to HM      Interval History:  Liver enzymes trending down, Cr trending down, changed ABX amoxicillin       Review of Systems   Constitutional: Negative.    HENT: Negative.     Eyes: Negative.    Respiratory: Negative.     Cardiovascular: Negative.    Gastrointestinal: Negative.    Endocrine: Negative.    Genitourinary: Negative.    Musculoskeletal: Negative.    Skin: Negative.    Neurological:  Positive for weakness.   Psychiatric/Behavioral:  Positive for confusion.      Objective:     Vitals:  Pulse: 78  BP: (!) 149/79  MAP (mmHg): 107  Resp: (!) 21  SpO2: 96 %    Temp  Min: 97.7 °F (36.5 °C)  Max: 98 °F (36.7 °C)  Pulse  Min: 67  Max: 92  BP  Min: 128/63  Max: 167/82  MAP (mmHg)  Min: 88  Max: 126  Resp  Min: 13  Max: 37  SpO2  Min: 95 %  Max: 98 %    06/10 0701 - 06/11 0700  In: 3924 [P.O.:1250; I.V.:1080]  Out: 830 [Urine:830]   Unmeasured Output  Urine Occurrence: (P) 1  Stool Occurrence: (P) 1        Physical Exam  Vitals and nursing note reviewed.   Constitutional:       Appearance: Normal appearance.   HENT:      Head: Normocephalic.      Nose: Nose normal.      Mouth/Throat:      Mouth: Mucous membranes are moist.      Pharynx: Oropharynx is clear.   Eyes:      Pupils: Pupils are equal, round, and reactive to light.   Cardiovascular:      Rate and Rhythm: Normal rate and regular rhythm.      Pulses: Normal pulses.      Heart sounds: Normal heart sounds.   Pulmonary:      Effort: Pulmonary effort is normal.      Breath sounds: Normal breath sounds.   Abdominal:      General: Bowel sounds are normal.      Palpations: Abdomen is soft.   Musculoskeletal:         General: Swelling present. Normal range of motion.   Skin:     General:  Skin is warm and dry.      Capillary Refill: Capillary refill takes 2 to 3 seconds.   Neurological:      Mental Status: She is alert.      Comments: GCS 15  AAO X2-3  PERRL  MUNIZ to command and spontaneously  Sensation Intact X4     Unable to test orientation, language, memory, judgment, insight, fund of knowledge, hearing, shoulder shrug, tongue protrusion, coordination, gait due to level of consciousness.       Medications:  Continuouslactated ringers, Last Rate: Stopped (06/11/23 0910)    Scheduledamoxicillin, 500 mg, Q12H  heparin (porcine), 5,000 Units, Q8H  lactulose, 15 g, Q6H  silodosin, 4 mg, Daily    PRNhydrOXYzine HCL, 25 mg, TID PRN  labetalol, 10 mg, Q15 Min PRN  magnesium oxide, 800 mg, PRN  magnesium oxide, 800 mg, PRN  ondansetron, 4 mg, Q6H PRN  oxyCODONE, 2.5 mg, Q4H PRN  potassium bicarbonate, 35 mEq, PRN  potassium bicarbonate, 50 mEq, PRN  potassium bicarbonate, 60 mEq, PRN  potassium, sodium phosphates, 2 packet, PRN  potassium, sodium phosphates, 2 packet, PRN  potassium, sodium phosphates, 2 packet, PRN  sodium chloride 0.9%, 10 mL, PRN      Today I personally reviewed pertinent medications, lines/drains/airways, imaging, cardiology results, laboratory results, microbiology results, notably:    Diet  Diet Adult Regular (IDDSI Level 7)  Diet Adult Regular (IDDSI Level 7)          Assessment/Plan:     Neuro  * Acute encephalopathy  Presented as transfer from [facility] after arrived encephalopathic there, initialy with unknown cause. She arrived hypotensive and hypoxic, nstemi, shock liver, feroz, and acute cystits from enterococcus uti     Today;   Off pressors (no pe, acs, possibly septic but  rapid decline and improvement seems unlikely, suspect drug related)  trops downtrending, new wma on tte,   encephalopathy improving, off narcan gtt  shock liver, improving (us unrevealing)  Feroz, non oliguric, good solute clearance  cth with possible L temporal collection/infarct, unlikely to have caused  shock    -q4 neuro checks  -pt/ot, oob, slp  -cont vanc for enterococcus uti, awaiting c/s  -hold on ac/ap, consider bb if remains hd stable  -appreciate consultant input  - more alert today    ENT  Tegmen defect of base of skull  S/p tegmentum defect repair on 6/6 wit NSGY  Step down and discharge on 6/7 by NSGY    NSGY consulted after transfer to [facility]  Incision left temple is C/D/I, and no fresh draiange noted to the left ear, some dried flecks of blood.    Renal/  Acute cystitis  UTI present on admission,  UA (+) enterococcus  Amoxicillin X2 two more days      KAROLINA (acute kidney injury)  Presented to OSH with new KAROLINA, Crt 1.4, worsening to > 1.9 GFR 20's after transfer  Follow I/O, Follow UO  IVF started, bolus given on arrival and it appears none was given prior to transfer  Given worsening KAROLINA, Liver function and Cardiac enzymes, believe global hypoxic event    GI  Acute liver failure  Presented at OSH with jamin increase in AST/ALT over pst 24hours,   AST/ALT normal on 6/7 and acutely increased to ASt 3569, ALT 2074 in 24 hrs  AST/ALT continuing to trend down  Bili is WNL  Ammonia trending downHepatology consulted  Liver US with doppler pending  Coags currently WNL  AST/ALT trending down  -thoughts are global hypoxic event    Other  Overdose  inittial wakefulness improved with narcan at OSH, gtt was started and continued to tranfers to [facility].   Stopped shortly after arrival. Follow exam closely     Increased ammonia level  Acute liver inury/failure  Hepatology following  Lactulose started  Trending down          The patient is being Prophylaxed for:  Venous Thromboembolism with: Mechanical or Chemical  Stress Ulcer with: Not Applicable   Ventilator Pneumonia with: not applicable    Activity Orders          Diet Adult Regular (IDDSI Level 7): Regular starting at 06/11 1130    Turn patient starting at 06/09 0421    Elevate HOB starting at 06/08 2225        Full Code  Level 3  Saumya Field NP  Neurocritical  Care  [facility]ellar - Neuro Critical Care

## 2023-06-11 NOTE — ASSESSMENT & PLAN NOTE
S/p tegmentum defect repair on 6/6 wit NSGY  Step down and discharge on 6/7 by NSGY    NSGY consulted after transfer to [facility]  Incision left temple is C/D/I, and no fresh draiange noted to the left ear, some dried flecks of blood.

## 2023-06-11 NOTE — PROGRESS NOTES
Pharmacokinetic Assessment Follow Up: IV Vancomycin    Vancomycin order has been discontinued. Pharmacy will sign off. Please reconsult as needed! Thank you!    Please contact pharmacy for questions regarding this assessment.    Thank you for the consult,   Angelita Singh

## 2023-06-11 NOTE — SUBJECTIVE & OBJECTIVE
Interval History:  Liver enzymes trending down, Cr trending down, changed ABX amoxicillin       Review of Systems   Constitutional: Negative.    HENT: Negative.     Eyes: Negative.    Respiratory: Negative.     Cardiovascular: Negative.    Gastrointestinal: Negative.    Endocrine: Negative.    Genitourinary: Negative.    Musculoskeletal: Negative.    Skin: Negative.    Neurological:  Positive for weakness.   Psychiatric/Behavioral:  Positive for confusion.      Objective:     Vitals:  Pulse: 78  BP: (!) 149/79  MAP (mmHg): 107  Resp: (!) 21  SpO2: 96 %    Temp  Min: 97.7 °F (36.5 °C)  Max: 98 °F (36.7 °C)  Pulse  Min: 67  Max: 92  BP  Min: 128/63  Max: 167/82  MAP (mmHg)  Min: 88  Max: 126  Resp  Min: 13  Max: 37  SpO2  Min: 95 %  Max: 98 %    06/10 0701 - 06/11 0700  In: 3924 [P.O.:1250; I.V.:1080]  Out: 830 [Urine:830]   Unmeasured Output  Urine Occurrence: (P) 1  Stool Occurrence: (P) 1        Physical Exam  Vitals and nursing note reviewed.   Constitutional:       Appearance: Normal appearance.   HENT:      Head: Normocephalic.      Nose: Nose normal.      Mouth/Throat:      Mouth: Mucous membranes are moist.      Pharynx: Oropharynx is clear.   Eyes:      Pupils: Pupils are equal, round, and reactive to light.   Cardiovascular:      Rate and Rhythm: Normal rate and regular rhythm.      Pulses: Normal pulses.      Heart sounds: Normal heart sounds.   Pulmonary:      Effort: Pulmonary effort is normal.      Breath sounds: Normal breath sounds.   Abdominal:      General: Bowel sounds are normal.      Palpations: Abdomen is soft.   Musculoskeletal:         General: Swelling present. Normal range of motion.   Skin:     General: Skin is warm and dry.      Capillary Refill: Capillary refill takes 2 to 3 seconds.   Neurological:      Mental Status: She is alert.      Comments: GCS 15  AAO X2-3  PERRL  MUNIZ to command and spontaneously  Sensation Intact X4     Unable to test orientation, language, memory, judgment,  insight, fund of knowledge, hearing, shoulder shrug, tongue protrusion, coordination, gait due to level of consciousness.       Medications:  Continuouslactated ringers, Last Rate: Stopped (06/11/23 0910)    Scheduledamoxicillin, 500 mg, Q12H  heparin (porcine), 5,000 Units, Q8H  lactulose, 15 g, Q6H  silodosin, 4 mg, Daily    PRNhydrOXYzine HCL, 25 mg, TID PRN  labetalol, 10 mg, Q15 Min PRN  magnesium oxide, 800 mg, PRN  magnesium oxide, 800 mg, PRN  ondansetron, 4 mg, Q6H PRN  oxyCODONE, 2.5 mg, Q4H PRN  potassium bicarbonate, 35 mEq, PRN  potassium bicarbonate, 50 mEq, PRN  potassium bicarbonate, 60 mEq, PRN  potassium, sodium phosphates, 2 packet, PRN  potassium, sodium phosphates, 2 packet, PRN  potassium, sodium phosphates, 2 packet, PRN  sodium chloride 0.9%, 10 mL, PRN      Today I personally reviewed pertinent medications, lines/drains/airways, imaging, cardiology results, laboratory results, microbiology results, notably:    Diet  Diet Adult Regular (IDDSI Level 7)  Diet Adult Regular (IDDSI Level 7)

## 2023-06-11 NOTE — ASSESSMENT & PLAN NOTE
Presented as transfer from [facility] after arrived encephalopathic there, initialy with unknown cause. She arrived hypotensive and hypoxic, nstemi, shock liver, feroz, and acute cystits from enterococcus uti     Today;   Off pressors (no pe, acs, possibly septic but  rapid decline and improvement seems unlikely, suspect drug related)  trops downtrending, new wma on tte,   encephalopathy improving, off narcan gtt  shock liver, improving (us unrevealing)  Feroz, non oliguric, good solute clearance  cth with possible L temporal collection/infarct, unlikely to have caused shock    -q4 neuro checks  -pt/ot, oob, slp  -cont vanc for enterococcus uti, awaiting c/s  -hold on ac/ap, consider bb if remains hd stable  -appreciate consultant input  - more alert today

## 2023-06-11 NOTE — ASSESSMENT & PLAN NOTE
inittial wakefulness improved with narcan at OSH, gtt was started and continued to tranfers to [facility].   Stopped shortly after arrival. Follow exam closely

## 2023-06-11 NOTE — PLAN OF CARE
[facility] Care Plan    POC reviewed with Tona Zafar and family at 1400. Pt verbalized understanding. Questions and concerns addressed. No acute events today. Pt progressing toward goals. Will continue to monitor. See below and flowsheets for full assessment and VS info.             Is this a stroke patient? no    Neuro:  [place] Coma Scale  Best Eye Response: 4-->(E4) spontaneous  Best Motor Response: 6-->(M6) obeys commands  Best Verbal Response: 5-->(V5) oriented  [place] Coma Scale Score: 15  Assessment Qualifiers: patient not sedated/intubated  Pupil PERRLA: yes     24 hr Temp:  [97.7 °F (36.5 °C)-98.2 °F (36.8 °C)]     CV:   Rhythm: normal sinus rhythm  BP goals:   SBP < 160  MAP > 65    Resp:           Plan:  2LNC    GI/:     Diet/Nutrition Received: regular  Last Bowel Movement: 06/11/23  Voiding Characteristics: unable to void    Intake/Output Summary (Last 24 hours) at 6/11/2023 1738  Last data filed at 6/11/2023 1502  Gross per 24 hour   Intake 2224.45 ml   Output 380 ml   Net 1844.45 ml     Unmeasured Output  Urine Occurrence: 1  Stool Occurrence: 1    Labs/Accuchecks:  Recent Labs   Lab 06/11/23  0501   WBC 6.06   RBC 3.52*   HGB 9.8*   HCT 31.0*         Recent Labs   Lab 06/11/23  0907      K 3.5   CO2 20*   *   BUN 38*   CREATININE 3.8*   ALKPHOS 119  119   ALT 1,731*  1,731*   *  607*   BILITOT 0.5  0.5      Recent Labs   Lab 06/09/23  0147 06/11/23  0127   INR 1.2 1.1   APTT 21.9  --       Recent Labs   Lab 06/09/23  1246 06/10/23  0022   CPK  --  661*   TROPONINI 1.527*  --        Electrolytes: N/A - electrolytes WDL  Accuchecks: none    Gtts:   lactated ringers 75 mL/hr at 06/11/23 1502       LDA/Wounds:  Lines/Drains/Airways       Peripheral Intravenous Line  Duration                  Peripheral IV - Single Lumen 06/08/23 0854 18 G Right Antecubital 3 days         Peripheral IV - Single Lumen 06/08/23 1317 20 G Left Antecubital 3 days                  Wounds:  No  Wound care consulted: No

## 2023-06-12 LAB
ALBUMIN SERPL BCP-MCNC: 2 G/DL (ref 3.5–5.2)
ALBUMIN SERPL BCP-MCNC: 2.3 G/DL (ref 3.5–5.2)
ALBUMIN SERPL BCP-MCNC: 2.3 G/DL (ref 3.5–5.2)
ALBUMIN SERPL BCP-MCNC: 2.4 G/DL (ref 3.5–5.2)
ALBUMIN SERPL BCP-MCNC: 2.4 G/DL (ref 3.5–5.2)
ALP SERPL-CCNC: 111 U/L (ref 55–135)
ALP SERPL-CCNC: 125 U/L (ref 55–135)
ALP SERPL-CCNC: 125 U/L (ref 55–135)
ALP SERPL-CCNC: 130 U/L (ref 55–135)
ALP SERPL-CCNC: 130 U/L (ref 55–135)
ALT SERPL W/O P-5'-P-CCNC: 1111 U/L (ref 10–44)
ALT SERPL W/O P-5'-P-CCNC: 1111 U/L (ref 10–44)
ALT SERPL W/O P-5'-P-CCNC: 1166 U/L (ref 10–44)
ALT SERPL W/O P-5'-P-CCNC: 1307 U/L (ref 10–44)
ALT SERPL W/O P-5'-P-CCNC: 1307 U/L (ref 10–44)
AMMONIA PLAS-SCNC: 65 UMOL/L (ref 10–50)
ANION GAP SERPL CALC-SCNC: 11 MMOL/L (ref 8–16)
ANION GAP SERPL CALC-SCNC: 7 MMOL/L (ref 8–16)
ANION GAP SERPL CALC-SCNC: 8 MMOL/L (ref 8–16)
AST SERPL-CCNC: 177 U/L (ref 10–40)
AST SERPL-CCNC: 177 U/L (ref 10–40)
AST SERPL-CCNC: 229 U/L (ref 10–40)
AST SERPL-CCNC: 242 U/L (ref 10–40)
AST SERPL-CCNC: 242 U/L (ref 10–40)
BILIRUB DIRECT SERPL-MCNC: 0.2 MG/DL (ref 0.1–0.3)
BILIRUB DIRECT SERPL-MCNC: 0.3 MG/DL (ref 0.1–0.3)
BILIRUB SERPL-MCNC: 0.4 MG/DL (ref 0.1–1)
BILIRUB SERPL-MCNC: 0.5 MG/DL (ref 0.1–1)
BILIRUB SERPL-MCNC: 0.5 MG/DL (ref 0.1–1)
BILIRUB SERPL-MCNC: 0.6 MG/DL (ref 0.1–1)
BILIRUB SERPL-MCNC: 0.6 MG/DL (ref 0.1–1)
BUN SERPL-MCNC: 36 MG/DL (ref 8–23)
BUN SERPL-MCNC: 37 MG/DL (ref 8–23)
BUN SERPL-MCNC: 38 MG/DL (ref 8–23)
CALCIUM SERPL-MCNC: 8 MG/DL (ref 8.7–10.5)
CALCIUM SERPL-MCNC: 8.2 MG/DL (ref 8.7–10.5)
CALCIUM SERPL-MCNC: 8.4 MG/DL (ref 8.7–10.5)
CHLORIDE SERPL-SCNC: 110 MMOL/L (ref 95–110)
CHLORIDE SERPL-SCNC: 110 MMOL/L (ref 95–110)
CHLORIDE SERPL-SCNC: 113 MMOL/L (ref 95–110)
CLINICAL BIOCHEMIST REVIEW: NORMAL
CO2 SERPL-SCNC: 21 MMOL/L (ref 23–29)
CO2 SERPL-SCNC: 23 MMOL/L (ref 23–29)
CO2 SERPL-SCNC: 24 MMOL/L (ref 23–29)
CREAT SERPL-MCNC: 4.2 MG/DL (ref 0.5–1.4)
EST. GFR  (NO RACE VARIABLE): 10.9 ML/MIN/1.73 M^2
GLUCOSE SERPL-MCNC: 116 MG/DL (ref 70–110)
GLUCOSE SERPL-MCNC: 94 MG/DL (ref 70–110)
GLUCOSE SERPL-MCNC: 98 MG/DL (ref 70–110)
INR PPP: 1.1 (ref 0.8–1.2)
MAGNESIUM SERPL-MCNC: 1.9 MG/DL (ref 1.6–2.6)
PHOSPHATE SERPL-MCNC: 3.9 MG/DL (ref 2.7–4.5)
PLPETH BLD-MCNC: 20 NG/ML
POPETH BLD-MCNC: 37 NG/ML
POTASSIUM SERPL-SCNC: 3.5 MMOL/L (ref 3.5–5.1)
POTASSIUM SERPL-SCNC: 3.5 MMOL/L (ref 3.5–5.1)
POTASSIUM SERPL-SCNC: 3.6 MMOL/L (ref 3.5–5.1)
PROT SERPL-MCNC: 4.3 G/DL (ref 6–8.4)
PROT SERPL-MCNC: 5 G/DL (ref 6–8.4)
PROT SERPL-MCNC: 5 G/DL (ref 6–8.4)
PROT SERPL-MCNC: 5.1 G/DL (ref 6–8.4)
PROT SERPL-MCNC: 5.1 G/DL (ref 6–8.4)
PROTHROMBIN TIME: 11.9 SEC (ref 9–12.5)
SODIUM SERPL-SCNC: 141 MMOL/L (ref 136–145)
SODIUM SERPL-SCNC: 142 MMOL/L (ref 136–145)
SODIUM SERPL-SCNC: 144 MMOL/L (ref 136–145)

## 2023-06-12 PROCEDURE — 82140 ASSAY OF AMMONIA: CPT | Performed by: NURSE PRACTITIONER

## 2023-06-12 PROCEDURE — 25000242 PHARM REV CODE 250 ALT 637 W/ HCPCS: Performed by: NURSE PRACTITIONER

## 2023-06-12 PROCEDURE — 92523 SPEECH SOUND LANG COMPREHEN: CPT

## 2023-06-12 PROCEDURE — 83735 ASSAY OF MAGNESIUM: CPT | Performed by: EMERGENCY MEDICINE

## 2023-06-12 PROCEDURE — 36415 COLL VENOUS BLD VENIPUNCTURE: CPT | Performed by: STUDENT IN AN ORGANIZED HEALTH CARE EDUCATION/TRAINING PROGRAM

## 2023-06-12 PROCEDURE — 80053 COMPREHEN METABOLIC PANEL: CPT | Mod: 91 | Performed by: NURSE PRACTITIONER

## 2023-06-12 PROCEDURE — 63600175 PHARM REV CODE 636 W HCPCS: Performed by: NURSE PRACTITIONER

## 2023-06-12 PROCEDURE — 97530 THERAPEUTIC ACTIVITIES: CPT

## 2023-06-12 PROCEDURE — 20600001 HC STEP DOWN PRIVATE ROOM

## 2023-06-12 PROCEDURE — 25000003 PHARM REV CODE 250: Performed by: NURSE PRACTITIONER

## 2023-06-12 PROCEDURE — 36415 COLL VENOUS BLD VENIPUNCTURE: CPT | Performed by: NURSE PRACTITIONER

## 2023-06-12 PROCEDURE — 99233 SBSQ HOSP IP/OBS HIGH 50: CPT | Mod: ,,, | Performed by: INTERNAL MEDICINE

## 2023-06-12 PROCEDURE — 97535 SELF CARE MNGMENT TRAINING: CPT

## 2023-06-12 PROCEDURE — 85610 PROTHROMBIN TIME: CPT | Performed by: STUDENT IN AN ORGANIZED HEALTH CARE EDUCATION/TRAINING PROGRAM

## 2023-06-12 PROCEDURE — 99223 1ST HOSP IP/OBS HIGH 75: CPT | Mod: ,,, | Performed by: NEUROLOGICAL SURGERY

## 2023-06-12 PROCEDURE — 99223 PR INITIAL HOSPITAL CARE,LEVL III: ICD-10-PCS | Mod: ,,, | Performed by: NEUROLOGICAL SURGERY

## 2023-06-12 PROCEDURE — 51798 US URINE CAPACITY MEASURE: CPT

## 2023-06-12 PROCEDURE — 51701 INSERT BLADDER CATHETER: CPT

## 2023-06-12 PROCEDURE — 80053 COMPREHEN METABOLIC PANEL: CPT | Mod: 91 | Performed by: EMERGENCY MEDICINE

## 2023-06-12 PROCEDURE — 99233 PR SUBSEQUENT HOSPITAL CARE,LEVL III: ICD-10-PCS | Mod: ,,, | Performed by: INTERNAL MEDICINE

## 2023-06-12 PROCEDURE — 84100 ASSAY OF PHOSPHORUS: CPT | Performed by: EMERGENCY MEDICINE

## 2023-06-12 RX ORDER — HYDROXYZINE HYDROCHLORIDE 25 MG/1
25 TABLET, FILM COATED ORAL 3 TIMES DAILY PRN
Status: DISCONTINUED | OUTPATIENT
Start: 2023-06-12 | End: 2023-06-15 | Stop reason: HOSPADM

## 2023-06-12 RX ADMIN — LACTULOSE 15 G: 20 SOLUTION ORAL at 06:06

## 2023-06-12 RX ADMIN — HEPARIN SODIUM 5000 UNITS: 5000 INJECTION INTRAVENOUS; SUBCUTANEOUS at 06:06

## 2023-06-12 RX ADMIN — LACTULOSE 15 G: 20 SOLUTION ORAL at 01:06

## 2023-06-12 RX ADMIN — HEPARIN SODIUM 5000 UNITS: 5000 INJECTION INTRAVENOUS; SUBCUTANEOUS at 11:06

## 2023-06-12 RX ADMIN — OXYCODONE HYDROCHLORIDE 2.5 MG: 5 SOLUTION ORAL at 09:06

## 2023-06-12 RX ADMIN — AMOXICILLIN 500 MG: 500 CAPSULE ORAL at 09:06

## 2023-06-12 RX ADMIN — HYDROXYZINE HYDROCHLORIDE 25 MG: 25 TABLET, FILM COATED ORAL at 01:06

## 2023-06-12 RX ADMIN — SILODOSIN 4 MG: 4 CAPSULE ORAL at 09:06

## 2023-06-12 RX ADMIN — SODIUM CHLORIDE, POTASSIUM CHLORIDE, SODIUM LACTATE AND CALCIUM CHLORIDE: 600; 310; 30; 20 INJECTION, SOLUTION INTRAVENOUS at 12:06

## 2023-06-12 NOTE — NURSING
Resting in bed, eyes open. Appears AAOx4, no acute distress noted or voiced. Left temporal incision dry, intact with sutures. Left cheek bruise noted. Spouse at bedside. Telemetry monitoring in use. Continue to monitor.

## 2023-06-12 NOTE — SUBJECTIVE & OBJECTIVE
Interval History: N[facility] transfer, CR remains at 4.2, LFT's are improving. Patinet would like to dc to home if possible eventually and not SNF.    Review of Systems  Objective:     Vital Signs (Most Recent):  Temp: 98.8 °F (37.1 °C) (06/12/23 0726)  Pulse: 79 (06/12/23 0726)  Resp: (!) 26 (06/12/23 0726)  BP: (!) 150/74 (06/12/23 0726)  SpO2: 97 % (06/12/23 0726) Vital Signs (24h Range):  Temp:  [98 °F (36.7 °C)-99.2 °F (37.3 °C)] 98.8 °F (37.1 °C)  Pulse:  [62-90] 79  Resp:  [14-37] 26  SpO2:  [94 %-100 %] 97 %  BP: (122-184)/() 150/74     Weight: 86.6 kg (190 lb 14.7 oz)  Body mass index is 27.39 kg/m².    Intake/Output Summary (Last 24 hours) at 6/12/2023 0845  Last data filed at 6/12/2023 0600  Gross per 24 hour   Intake 2337.32 ml   Output 850 ml   Net 1487.32 ml      Physical Exam  Vitals and nursing note reviewed.   Constitutional:       Appearance: Normal appearance.   HENT:      Head: Normocephalic.      Nose: Nose normal.      Mouth/Throat:      Mouth: Mucous membranes are moist.      Pharynx: Oropharynx is clear.   Eyes:      Pupils: Pupils are equal, round, and reactive to light.   Cardiovascular:      Rate and Rhythm: Normal rate and regular rhythm.      Pulses: Normal pulses.      Heart sounds: Normal heart sounds.   Pulmonary:      Effort: Pulmonary effort is normal.      Breath sounds: Normal breath sounds.   Abdominal:      General: Bowel sounds are normal.      Palpations: Abdomen is soft.   Musculoskeletal:         General: Swelling present. Normal range of motion.   Skin:     General: Skin is warm and dry.      Capillary Refill: Capillary refill takes 2 to 3 seconds.   Neurological:      Mental Status: She is alert.      Comments: GCS 15  AAO X2-3  PERRL  MUNIZ to command and spontaneously  Sensation Intact X4       MELD-Na: 21 at 6/12/2023  5:27 AM  MELD: 21 at 6/12/2023  5:27 AM  Calculated from:  Serum Creatinine: 4.2 mg/dL (Using max of 4 mg/dL) at 6/12/2023  5:27 AM  Serum Sodium: 142  mmol/L (Using max of 137 mmol/L) at 6/12/2023  5:27 AM  Total Bilirubin: 0.4 mg/dL (Using min of 1 mg/dL) at 6/12/2023  5:27 AM  INR(ratio): 1.1 at 6/12/2023  5:27 AM      Significant Labs:  CBC:  Recent Labs   Lab 06/11/23  0501   WBC 6.06   HGB 9.8*   HCT 31.0*        CMP:  Recent Labs   Lab 06/11/23  0907 06/11/23  1918 06/12/23  0527    141 142   K 3.5 3.7 3.5   * 111* 113*   CO2 20* 20* 21*   GLU 89 124* 94   BUN 38* 37* 37*   CREATININE 3.8* 4.1* 4.2*   CALCIUM 8.0* 8.2* 8.0*   PROT 4.7*  4.7* 5.1*  5.1* 4.3*   ALBUMIN 2.2*  2.2* 2.4*  2.4* 2.0*   BILITOT 0.5  0.5 0.5  0.5 0.4   ALKPHOS 119  119 133  133 111   *  607* 401*  401* 229*   ALT 1,731*  1,731* 1,605*  1,605* 1,166*   ANIONGAP 8 10 8     PTINR:  Recent Labs   Lab 06/11/23  0127 06/12/23 0527   INR 1.1 1.1       Significant Procedures:   Dobutamine Stress Test with Color Flow: No results found for this or any previous visit.    None

## 2023-06-12 NOTE — PLAN OF CARE
Problem: Adult Inpatient Plan of Care  Goal: Optimal Comfort and Wellbeing  Outcome: Ongoing, Progressing  Intervention: Monitor Pain and Promote Comfort  Flowsheets (Taken 6/12/2023 0413)  Pain Management Interventions: position adjusted     Problem: Skin Injury Risk Increased  Goal: Skin Health and Integrity  Outcome: Ongoing, Progressing  Intervention: Optimize Skin Protection  Flowsheets (Taken 6/12/2023 0413)  Pressure Reduction Devices:   positioning supports utilized   pressure-redistributing mattress utilized   foam padding utilized  Head of Bed (HOB) Positioning: HOB elevated

## 2023-06-12 NOTE — PLAN OF CARE
Problem: Altered Behavior (Delirium)  Goal: Improved Behavioral Control  6/12/2023 1829 by Ankush Ruiz RN  Outcome: Ongoing, Progressing  6/12/2023 1827 by Ankush Ruiz RN  Outcome: Ongoing, Progressing     Problem: Sleep Disturbance (Delirium)  Goal: Improved Sleep  6/12/2023 1829 by Ankush Ruiz RN  Outcome: Ongoing, Progressing  6/12/2023 1827 by Ankush Ruiz RN  Outcome: Ongoing, Progressing     Problem: Infection  Goal: Absence of Infection Signs and Symptoms  6/12/2023 1829 by Ankush Ruiz RN  Outcome: Ongoing, Progressing  6/12/2023 1827 by Ankush Ruiz RN  Outcome: Ongoing, Progressing     Problem: Impaired Wound Healing  Goal: Optimal Wound Healing  6/12/2023 1829 by Ankush Ruiz RN  Outcome: Ongoing, Progressing  6/12/2023 1827 by Ankush Ruiz RN  Outcome: Ongoing, Progressing     Problem: Fall Injury Risk  Goal: Absence of Fall and Fall-Related Injury  6/12/2023 1829 by Ankush Ruiz RN  Outcome: Ongoing, Progressing  6/12/2023 1827 by Ankush Ruiz RN  Outcome: Ongoing, Progressing

## 2023-06-12 NOTE — PLAN OF CARE
06/12/23 1611   Post-Acute Status   Post-Acute Authorization Home Health   Home Health Status Referrals Sent  ([facility] [phone])   Coverage [employer] MANAGED MEDICARE - [employer] MED[employer] HMO   Discharge Delays None known at this time   Discharge Plan   Discharge Plan A Home Health   Discharge Plan B Home with family     CM met with patient and spouse and referrals sent to [facility] and will need  orders. Patient referred to [facility].    Patient dc'd last week from [facility] per patient.       Saumya Borjas RN  Case Management  [facility]  [phone]

## 2023-06-12 NOTE — ASSESSMENT & PLAN NOTE
Presented at OSH with jamin increase in AST/ALT over pst 24hours,   AST/ALT normal on 6/7 and acutely increased to ASt 3569, ALT 2074 in 24 hrs  AST/ALT continuing to trend down  Bili is WNL  Ammonia trending downHepatology consulted  Liver US with doppler pending  Coags currently WNL  AST/ALT trending down  -thoughts are global hypoxic event

## 2023-06-12 NOTE — NURSING
Nurses Note -- 4 Eyes      6/12/2023   12:03 AM      Skin assessed during: Transfer      [] No Altered Skin Integrity Present    []Prevention Measures Documented      [x] Yes- Altered Skin Integrity Present or Discovered   [x] LDA Added if Not in Epic (Describe Wound)   [x] New Altered Skin Integrity was Present on Admit and Documented in LDA   [x] Wound Image Taken    Wound Care Consulted? No    Attending Nurse:  Carol Villafuerte RN     Second RN/Staff Member:  Gabbie Teran RN    Upon transfer to room 83171 from neuro ICU, skin assessment was completed. Patient has bruising to lower left eye, a left temporal craniotomy incision, and a small area of redness to mid lower back from where patient states she had a procedure.

## 2023-06-12 NOTE — PLAN OF CARE
Problem: Adult Inpatient Plan of Care  Goal: Plan of Care Review  Outcome: Ongoing, Progressing  Goal: Patient-Specific Goal (Individualized)  Outcome: Ongoing, Progressing  Goal: Absence of Hospital-Acquired Illness or Injury  Outcome: Ongoing, Progressing  Goal: Optimal Comfort and Wellbeing  Outcome: Ongoing, Progressing  Goal: Readiness for Transition of Care  Outcome: Ongoing, Progressing     Problem: Skin Injury Risk Increased  Goal: Skin Health and Integrity  Outcome: Ongoing, Progressing     Problem: Adjustment to Illness (Stroke, Hemorrhagic)  Goal: Optimal Coping  Outcome: Ongoing, Progressing     Problem: Bowel Elimination Impaired (Stroke, Hemorrhagic)  Goal: Effective Bowel Elimination  Outcome: Ongoing, Progressing     Problem: Cerebral Tissue Perfusion (Stroke, Hemorrhagic)  Goal: Optimal Cerebral Tissue Perfusion  Outcome: Ongoing, Progressing     Problem: Cognitive Impairment (Stroke, Hemorrhagic)  Goal: Optimal Cognitive Function  Outcome: Ongoing, Progressing     Problem: Communication Impairment (Stroke, Hemorrhagic)  Goal: Effective Communication Skills  Outcome: Ongoing, Progressing     Problem: Functional Ability Impaired (Stroke, Hemorrhagic)  Goal: Optimal Functional Ability  Outcome: Ongoing, Progressing     Problem: Pain (Stroke, Hemorrhagic)  Goal: Acceptable Pain Control  Outcome: Ongoing, Progressing     Problem: Respiratory Compromise (Stroke, Hemorrhagic)  Goal: Effective Oxygenation and Ventilation  Outcome: Ongoing, Progressing     Problem: Sensorimotor Impairment (Stroke, Hemorrhagic)  Goal: Improved Sensorimotor Function  Outcome: Ongoing, Progressing     Problem: Swallowing Impairment (Stroke, Hemorrhagic)  Goal: Optimal Eating and Swallowing Without Aspiration  Outcome: Ongoing, Progressing     Problem: Urinary Elimination Impaired (Stroke, Hemorrhagic)  Goal: Effective Urinary Elimination  Outcome: Ongoing, Progressing     Problem: Fluid and Electrolyte Imbalance (Acute  Kidney Injury/Impairment)  Goal: Fluid and Electrolyte Balance  Outcome: Ongoing, Progressing     Problem: Oral Intake Inadequate (Acute Kidney Injury/Impairment)  Goal: Optimal Nutrition Intake  Outcome: Ongoing, Progressing     Problem: Renal Function Impairment (Acute Kidney Injury/Impairment)  Goal: Effective Renal Function  Outcome: Ongoing, Progressing     Problem: Adjustment to Illness (Delirium)  Goal: Optimal Coping  Outcome: Ongoing, Progressing     Problem: Altered Behavior (Delirium)  Goal: Improved Behavioral Control  Outcome: Ongoing, Progressing     Problem: Attention and Thought Clarity Impairment (Delirium)  Goal: Improved Attention and Thought Clarity  Outcome: Ongoing, Progressing     Problem: Sleep Disturbance (Delirium)  Goal: Improved Sleep  Outcome: Ongoing, Progressing     Problem: Infection  Goal: Absence of Infection Signs and Symptoms  Outcome: Ongoing, Progressing     Problem: Impaired Wound Healing  Goal: Optimal Wound Healing  Outcome: Ongoing, Progressing     Problem: Impaired Wound Healing  Goal: Optimal Wound Healing  Outcome: Ongoing, Progressing     Problem: Fall Injury Risk  Goal: Absence of Fall and Fall-Related Injury  Outcome: Ongoing, Progressing

## 2023-06-12 NOTE — ASSESSMENT & PLAN NOTE
S/p tegmentum defect repair on 6/6 wit NSGY  Step down and discharge on 6/7 by NSGY     NSGY consulted after transfer to [facility]  Incision left temple is C/D/I, and no fresh draiange noted to the left ear, some dried flecks of blood.

## 2023-06-12 NOTE — NURSING
"End of shift note:    Patient complained of anxiety/shortness of breath around midnight. She also stated that was scared to go to sleep because she "almost ." Prn hydroxyzine given, and it did help patient relax and go to sleep. Vitals stable. Patient oriented x 4.  "

## 2023-06-12 NOTE — ASSESSMENT & PLAN NOTE
inittial wakefulness improved with narcan at OSH, gtt was started and continued to tranfers to [facility].   Stopped shortly after arrival. Follow exam closely

## 2023-06-12 NOTE — PROGRESS NOTES
[facility] - Intensive Care ([address] Medicine  Progress Note    Patient Name: Tona Zafar  MRN: [id number]  Patient Class: IP- Inpatient   Admission Date: 6/8/2023  Length of Stay: 4 days  Attending Physician: Salvatore Horn MD  Primary Care Provider: Hubert Barrera MD        Subjective:     Principal Problem:Acute encephalopathy        HPI:  No notes on file    Overview/Hospital Course:  No notes on file    Interval History: NCC transfer, CR remains at 4.2, LFT's are improving. Patinet would like to dc to home if possible eventually and not SNF.    Review of Systems  Objective:     Vital Signs (Most Recent):  Temp: 98.8 °F (37.1 °C) (06/12/23 0726)  Pulse: 79 (06/12/23 0726)  Resp: (!) 26 (06/12/23 0726)  BP: (!) 150/74 (06/12/23 0726)  SpO2: 97 % (06/12/23 0726) Vital Signs (24h Range):  Temp:  [98 °F (36.7 °C)-99.2 °F (37.3 °C)] 98.8 °F (37.1 °C)  Pulse:  [62-90] 79  Resp:  [14-37] 26  SpO2:  [94 %-100 %] 97 %  BP: (122-184)/() 150/74     Weight: 86.6 kg (190 lb 14.7 oz)  Body mass index is 27.39 kg/m².    Intake/Output Summary (Last 24 hours) at 6/12/2023 0845  Last data filed at 6/12/2023 0600  Gross per 24 hour   Intake 2337.32 ml   Output 850 ml   Net 1487.32 ml      Physical Exam  Vitals and nursing note reviewed.   Constitutional:       Appearance: Normal appearance.   HENT:      Head: Normocephalic.      Nose: Nose normal.      Mouth/Throat:      Mouth: Mucous membranes are moist.      Pharynx: Oropharynx is clear.   Eyes:      Pupils: Pupils are equal, round, and reactive to light.   Cardiovascular:      Rate and Rhythm: Normal rate and regular rhythm.      Pulses: Normal pulses.      Heart sounds: Normal heart sounds.   Pulmonary:      Effort: Pulmonary effort is normal.      Breath sounds: Normal breath sounds.   Abdominal:      General: Bowel sounds are normal.      Palpations: Abdomen is soft.   Musculoskeletal:         General: Swelling present. Normal range of motion.    Skin:     General: Skin is warm and dry.      Capillary Refill: Capillary refill takes 2 to 3 seconds.   Neurological:      Mental Status: She is alert.      Comments: GCS 15  AAO X2-3  PERRL  MUNIZ to command and spontaneously  Sensation Intact X4       MELD-Na: 21 at 6/12/2023  5:27 AM  MELD: 21 at 6/12/2023  5:27 AM  Calculated from:  Serum Creatinine: 4.2 mg/dL (Using max of 4 mg/dL) at 6/12/2023  5:27 AM  Serum Sodium: 142 mmol/L (Using max of 137 mmol/L) at 6/12/2023  5:27 AM  Total Bilirubin: 0.4 mg/dL (Using min of 1 mg/dL) at 6/12/2023  5:27 AM  INR(ratio): 1.1 at 6/12/2023  5:27 AM      Significant Labs:  CBC:  Recent Labs   Lab 06/11/23  0501   WBC 6.06   HGB 9.8*   HCT 31.0*        CMP:  Recent Labs   Lab 06/11/23  0907 06/11/23  1918 06/12/23  0527    141 142   K 3.5 3.7 3.5   * 111* 113*   CO2 20* 20* 21*   GLU 89 124* 94   BUN 38* 37* 37*   CREATININE 3.8* 4.1* 4.2*   CALCIUM 8.0* 8.2* 8.0*   PROT 4.7*  4.7* 5.1*  5.1* 4.3*   ALBUMIN 2.2*  2.2* 2.4*  2.4* 2.0*   BILITOT 0.5  0.5 0.5  0.5 0.4   ALKPHOS 119  119 133  133 111   *  607* 401*  401* 229*   ALT 1,731*  1,731* 1,605*  1,605* 1,166*   ANIONGAP 8 10 8     PTINR:  Recent Labs   Lab 06/11/23  0127 06/12/23  0527   INR 1.1 1.1       Significant Procedures:   Dobutamine Stress Test with Color Flow: No results found for this or any previous visit.    None      Assessment/Plan:      * Acute encephalopathy  Presented as transfer from [facility] after arrived encephalopathic there, initialy with unknown cause. She arrived hypotensive and hypoxic, nstemi, shock liver, karolina, and acute cystits from enterococcus uti                 Overdose  inittial wakefulness improved with narcan at OSH, gtt was started and continued to tranfers to [facility].   Stopped shortly after arrival. Follow exam closely       KAROLINA (acute kidney injury)  Presented to OSH with new KAROLINA, Crt 1.4, worsening to > 1.9 GFR 20's after transfer  Follow I/O, Follow  UO  IVF started, bolus given on arrival and it appears none was given prior to transfer  Given worsening KAROLINA, Liver function and Cardiac enzymes, believe global hypoxic event      Acute liver failure  Presented at OSH with jamin increase in AST/ALT over pst 24hours,   AST/ALT normal on 6/7 and acutely increased to ASt 3569, ALT 2074 in 24 hrs  AST/ALT continuing to trend down  Bili is WNL  Ammonia trending downHepatology consulted  Liver US with doppler pending  Coags currently WNL  AST/ALT trending down  -thoughts are global hypoxic event      Tegmen defect of base of skull  S/p tegmentum defect repair on 6/6 wit NSGY  Step down and discharge on 6/7 by NSGY     NSGY consulted after transfer to [facility]  Incision left temple is C/D/I, and no fresh draiange noted to the left ear, some dried flecks of blood.        VTE Risk Mitigation (From admission, onward)         Ordered     heparin (porcine) injection 5,000 Units  Every 8 hours         06/11/23 1026     IP VTE HIGH RISK PATIENT  Once         06/08/23 2225     Place sequential compression device  Until discontinued         06/08/23 2225                Discharge Planning   FARZANEH: 6/14/2023     Code Status: Full Code   Is the patient medically ready for discharge?:     Reason for patient still in hospital (select all that apply): Patient unstable  Discharge Plan A: Rehab                  Salvatore Horn MD  Department of Hospital Medicine   [facility] - Intensive Care ([place]-14)

## 2023-06-12 NOTE — PT/OT/SLP PROGRESS
"Occupational Therapy   Treatment    Name: Tona Zafar  MRN: [id number]  Admitting Diagnosis:  Acute encephalopathy       Recommendations:     Discharge Recommendations: other (see comments)  Discharge Equipment Recommendations:  none  Barriers to discharge:  None    Assessment:     Tona Zafar is a 68 y.o. female with a medical diagnosis of Acute encephalopathy.  She presents with performance deficits affecting function are weakness, impaired endurance, impaired self care skills, impaired functional mobility, gait instability, impaired balance.     Rehab Prognosis:  Good; patient would benefit from acute skilled OT services to address these deficits and reach maximum level of function.       Plan:     Patient to be seen 3 x/week to address the above listed problems via self-care/home management, therapeutic activities, therapeutic exercises, neuromuscular re-education  Plan of Care Expires: 07/09/23  Plan of Care Reviewed with: patient, spouse    Subjective     Chief Complaint: acute encephalopathy  Patient/Family Comments/goals: "my back hurts"  Pain/Comfort:  Pain Rating 1: 5/10  Location - Orientation 1: generalized  Location 1: back  Pain Addressed 1: Reposition, Distraction, Nurse notified  Pain Rating Post-Intervention 1: 5/10    Objective:     Communicated with: RN prior to session.  Patient found HOB elevated with oxygen, peripheral IV, blood pressure cuff, pulse ox (continuous), telemetry upon OT entry to room.    General Precautions: Standard, fall    Orthopedic Precautions:N/A  Braces: N/A  Respiratory Status: Nasal cannula     Occupational Performance:     Bed Mobility:    Patient completed Rolling/Turning to Left with  stand by assistance  Patient completed Supine to Sit with stand by assistance     Functional Mobility/Transfers:  Patient completed Sit <> Stand Transfer with contact guard assistance  with  rolling walker   Functional Mobility: Pt completed walking ~25' with RW, CGA    Activities " of Daily Living:  Pt deferred ADLs      [facility] 6 Click ADL: 20    Treatment & Education:  Pt educated on role of OT, POC, safety during ADLs. Pt given call light and instructed to ask for assistance with needs/transfers when needed.     Patient left up in chair with all lines intact, call button in reach, RN notified, and  present    GOALS:   Multidisciplinary Problems       Occupational Therapy Goals          Problem: Occupational Therapy    Goal Priority Disciplines Outcome Interventions   Occupational Therapy Goal     OT, PT/OT Ongoing, Progressing    Description: Goals to be met by: 6/23/23     Patient will increase functional independence with ADLs by performing:    UE Dressing with Supervision.  LE Dressing with Supervision.  Grooming while standing at sink with Supervision.  Toileting from toilet with Supervision for hygiene and clothing management.   Toilet transfer to toilet with Supervision.                         Time Tracking:     OT Date of Treatment: 06/12/23  OT Start Time: 1045  OT Stop Time: 1108  OT Total Time (min): 23 min    Billable Minutes:Therapeutic Activity 15    OT/STARR: OT          6/12/2023

## 2023-06-12 NOTE — NURSING
"Patient called nurse to room due to "feeling short of breath." RN assessment patient. Patient is currently on 2L nasal cannula with oxygen saturation of 98%, heart rate 64, and blood pressure of 184/99. PRN medication was given for blood pressure. Lungs auscultated and no abnormal breath sounds were heard. Patient states also feeling anxious but does not have any pain. RN messaged provider on-call for Team A, with patient concerns.  "

## 2023-06-12 NOTE — PLAN OF CARE
Problem: SLP  Goal: SLP Goal  Description: Speech Pathology Goals  To be met by 6/26/23    1. Pt will complete ongoing assessment of reading, writing, and visuospatial skills   2. Pt will answer complex Y/N questions with 80% accuracy given MIN verbal cueing  3. Pt will identify x10 items within a concrete category  4. Pt will answer reasoning questions with 90% accuracy given MIN verbal cueing       Outcome: Ongoing, Progressing    Speech/language assessment completed, goals added

## 2023-06-12 NOTE — NURSING TRANSFER
Nursing Transfer Note      6/12/2023     Reason patient is being transferred: stepdown from icu    Transfer To: room 04199    Transfer via bed    Transfer with  to O2, cardiac monitoring    Transported by RN    Telemetry: Box Number bedside monitor    Medicines sent: n/a    Any special needs or follow-up needed: n/a    Chart send with patient: Yes    Notified:  at bedside    Patient reassessed at: 6/11/23 2020 (date, time)  1  Upon arrival to floor: cardiac monitor applied, patient oriented to room, call bell in reach, and bed in lowest position

## 2023-06-12 NOTE — PT/OT/SLP EVAL
Speech Language Pathology Evaluation  Cognitive Communication    Patient Name:  Tona Zafar   MRN:  [id number]  Admitting Diagnosis: Acute encephalopathy    Recommendations:     Recommendations:                General Recommendations:  Dysphagia therapy  Diet recommendations:  Mechanical soft, Thin   Aspiration Precautions: Small bites/sips and Standard aspiration precautions   General Precautions: Standard, aphasia, aspiration, fall  Communication strategies:  provide increased time to answer    Assessment:     Tona Zafar is a 68 y.o. female with an SLP diagnosis of mild Cognitive-Linguistic Impairment. She presents with deficits in higher level reasoning and complex direction following. SLP to continue to follow.     History:     Past Medical History:   Diagnosis Date    Abnormal bone density screening     Colon polyps     Degenerative joint disease 02/05/2007    Diverticulosis     Gastroparesis 2018    History of chicken pox     History of depression 02/05/2007    Ischemic colitis 2020    Dr. Drew    Lumbar disc disease        Past Surgical History:   Procedure Laterality Date    APPENDECTOMY      AUGMENTATION OF BREAST      BREAST SURGERY Bilateral 2005    Implants    CHOLECYSTECTOMY  06/2012    COLONOSCOPY  05/12/2020    Dr. Drew, in procedures; repeat in 6 months to check for healing of ischemic colitis    COLONOSCOPY  05/01/2018    Dr. Drew, in procedures    DENTAL SURGERY      DILATION AND CURETTAGE OF UTERUS      HIP SURGERY Right 2007    LUMBAR PUNCTURE N/A 6/5/2023    Procedure: LUMBAR PUNCTURE;  Surgeon: Rangel Perez DO;  Location: [facility] OR [address] [place] [address];  Service: Neurosurgery;  Laterality: N/A;    REPAIR, CSF LEAK, CRANIUM, MIDDLE FOSSA APPROACH Left 6/5/2023    Procedure: REPAIR, CSF LEAK, CRANIUM, MIDDLE FOSSA APPROACH;  Surgeon: Gasper Griffith MD;  Location: [facility] OR [address] [place] [address];  Service: ENT;  Laterality: Left;    REPAIR, CSF LEAK, CRANIUM, MIDDLE FOSSA APPROACH Left 6/5/2023    Procedure: REPAIR,  CSF LEAK, CRANIUM, MIDDLE FOSSA APPROACH;  Surgeon: Rangel Perez DO;  Location: [facility] OR [address] [place] [address];  Service: Neurosurgery;  Laterality: Left;    TONSILLECTOMY      UPPER GASTROINTESTINAL ENDOSCOPY  04/24/2018    Dr. Drew, in media    WRIST SURGERY Left 01/2014       Social History: Patient lives with her spouse.    Prior Intubation HX:  n/a    Modified Barium Swallow: n/a    Chest X-Rays: 6/8/23- Question trace central vascular congestion with patchy opacity in the right lung base favored to represent atelectasis given volume loss.  Superimposed infection is not completely excluded.    Prior diet: Regular/thin    Occupation/hobbies/homemaking: none stated    Subjective     Pt found resting in bed with spouse at bedside upon SLP entry into room. Pt agreeable to participate in all aspects of session.      Patient goals: none stated     Pain/Comfort:  Pain Rating 1: 0/10    Respiratory Status: Room air    Objective:     Cognitive Status:    Arousal/Alertness - Appropriate response to stimuli  Attention - No obvious deficits observed    Problem Solving - 1/3 Indep, 3/3 given MIN verbal cueing   Reasoning - 4/5 Indep, 5/5 given MIN verbal cueing  Sequencing - 2/2 Indep  Categorization - Pt named x7 items in a concrete category given 60 seconds (WNL 15-20)    Pragmatics:    WFL    Expressive Language  Responsive naming - 2/3 Indep, 3/3 with modified indep  Generative naming - 3/3 Indep  Conversational speech tasks - No evidence of language deficits       Receptive Language  Complex Y/N questions - 1/4 Indep  2 step directions - 2/2 indep  Receptive identification of concrete objects - 5/5 Indep  Paragraph comprehension - 4/4 Indep    Motor Speech:  No dysarthria or apraxia noted    Voice:   WFL    Visual-Spatial:  TBA    Reading:   TBA     Written Expression:   TBA       Treatment: SLP spoke with pt and significant other regarding overall impressions, deficits in areas of higher level cognitive linguistic skills, and  ongoing SLP POC. Pt and SO verbalized understanding and had no additional questions or concerns upon SLP exit.       Goals:   Multidisciplinary Problems       SLP Goals          Problem: SLP    Goal Priority Disciplines Outcome   SLP Goal     SLP Ongoing, Progressing   Description: Speech Pathology Goals  To be met by 6/26/23    1. Pt will complete ongoing assessment of reading, writing, and visuospatial skills   2. Pt will answer complex Y/N questions with 80% accuracy given MIN verbal cueing  3. Pt will identify x10 items within a concrete category  4. Pt will answer reasoning questions with 90% accuracy given MIN verbal cueing                                     Plan:   Patient to be seen:  4 x/week   Plan of Care expires:     Plan of Care reviewed with:  patient, significant other   SLP Follow-Up:  Yes       Discharge recommendations:  Discharge Facility/Level of Care Needs: other (see comments)   Barriers to Discharge:  None    Time Tracking:     SLP Treatment Date:   06/12/23  Speech Start Time:  0958  Speech Stop Time:  1014     Speech Total Time (min):  16 min    Billable Minutes: Eval 8  and Self Care/Home Management Training 8      06/12/2023

## 2023-06-12 NOTE — ASSESSMENT & PLAN NOTE
Presented as transfer from [facility] after arrived encephalopathic there, initialy with unknown cause. She arrived hypotensive and hypoxic, nstemi, shock liver, ria, and acute cystits from enterococcus uti

## 2023-06-13 LAB
ALBUMIN SERPL BCP-MCNC: 2.2 G/DL (ref 3.5–5.2)
ALBUMIN SERPL BCP-MCNC: 2.2 G/DL (ref 3.5–5.2)
ALBUMIN SERPL BCP-MCNC: 2.4 G/DL (ref 3.5–5.2)
ALBUMIN SERPL BCP-MCNC: 2.4 G/DL (ref 3.5–5.2)
ALBUMIN SERPL BCP-MCNC: 2.5 G/DL (ref 3.5–5.2)
ALP SERPL-CCNC: 116 U/L (ref 55–135)
ALP SERPL-CCNC: 116 U/L (ref 55–135)
ALP SERPL-CCNC: 128 U/L (ref 55–135)
ALP SERPL-CCNC: 128 U/L (ref 55–135)
ALP SERPL-CCNC: 133 U/L (ref 55–135)
ALT SERPL W/O P-5'-P-CCNC: 1016 U/L (ref 10–44)
ALT SERPL W/O P-5'-P-CCNC: 828 U/L (ref 10–44)
ALT SERPL W/O P-5'-P-CCNC: 828 U/L (ref 10–44)
ALT SERPL W/O P-5'-P-CCNC: 870 U/L (ref 10–44)
ALT SERPL W/O P-5'-P-CCNC: 870 U/L (ref 10–44)
AMMONIA PLAS-SCNC: 55 UMOL/L (ref 10–50)
ANION GAP SERPL CALC-SCNC: 10 MMOL/L (ref 8–16)
ANION GAP SERPL CALC-SCNC: 12 MMOL/L (ref 8–16)
ANION GAP SERPL CALC-SCNC: 9 MMOL/L (ref 8–16)
AST SERPL-CCNC: 108 U/L (ref 10–40)
AST SERPL-CCNC: 108 U/L (ref 10–40)
AST SERPL-CCNC: 110 U/L (ref 10–40)
AST SERPL-CCNC: 110 U/L (ref 10–40)
AST SERPL-CCNC: 136 U/L (ref 10–40)
BACTERIA #/AREA URNS AUTO: ABNORMAL /HPF
BASOPHILS # BLD AUTO: 0.05 K/UL (ref 0–0.2)
BASOPHILS NFR BLD: 0.6 % (ref 0–1.9)
BILIRUB DIRECT SERPL-MCNC: 0.3 MG/DL (ref 0.1–0.3)
BILIRUB DIRECT SERPL-MCNC: 0.4 MG/DL (ref 0.1–0.3)
BILIRUB SERPL-MCNC: 0.5 MG/DL (ref 0.1–1)
BILIRUB SERPL-MCNC: 0.6 MG/DL (ref 0.1–1)
BILIRUB UR QL STRIP: NEGATIVE
BUN SERPL-MCNC: 35 MG/DL (ref 8–23)
CALCIUM SERPL-MCNC: 8.3 MG/DL (ref 8.7–10.5)
CALCIUM SERPL-MCNC: 8.6 MG/DL (ref 8.7–10.5)
CALCIUM SERPL-MCNC: 8.6 MG/DL (ref 8.7–10.5)
CHLORIDE SERPL-SCNC: 108 MMOL/L (ref 95–110)
CHLORIDE SERPL-SCNC: 110 MMOL/L (ref 95–110)
CHLORIDE SERPL-SCNC: 111 MMOL/L (ref 95–110)
CLARITY UR REFRACT.AUTO: CLEAR
CO2 SERPL-SCNC: 21 MMOL/L (ref 23–29)
CO2 SERPL-SCNC: 22 MMOL/L (ref 23–29)
CO2 SERPL-SCNC: 22 MMOL/L (ref 23–29)
COLOR UR AUTO: YELLOW
CREAT SERPL-MCNC: 4.3 MG/DL (ref 0.5–1.4)
CREAT SERPL-MCNC: 4.5 MG/DL (ref 0.5–1.4)
CREAT SERPL-MCNC: 4.5 MG/DL (ref 0.5–1.4)
CREAT UR-MCNC: 57 MG/DL (ref 15–325)
DIFFERENTIAL METHOD: ABNORMAL
EOSINOPHIL # BLD AUTO: 0.4 K/UL (ref 0–0.5)
EOSINOPHIL NFR BLD: 4.1 % (ref 0–8)
ERYTHROCYTE [DISTWIDTH] IN BLOOD BY AUTOMATED COUNT: 14.4 % (ref 11.5–14.5)
EST. GFR  (NO RACE VARIABLE): 10 ML/MIN/1.73 M^2
EST. GFR  (NO RACE VARIABLE): 10 ML/MIN/1.73 M^2
EST. GFR  (NO RACE VARIABLE): 10.6 ML/MIN/1.73 M^2
GLUCOSE SERPL-MCNC: 105 MG/DL (ref 70–110)
GLUCOSE SERPL-MCNC: 98 MG/DL (ref 70–110)
GLUCOSE SERPL-MCNC: 99 MG/DL (ref 70–110)
GLUCOSE UR QL STRIP: NEGATIVE
HCT VFR BLD AUTO: 34.3 % (ref 37–48.5)
HGB BLD-MCNC: 10.6 G/DL (ref 12–16)
HGB UR QL STRIP: NEGATIVE
IMM GRANULOCYTES # BLD AUTO: 0.06 K/UL (ref 0–0.04)
IMM GRANULOCYTES NFR BLD AUTO: 0.7 % (ref 0–0.5)
KETONES UR QL STRIP: NEGATIVE
LEUKOCYTE ESTERASE UR QL STRIP: ABNORMAL
LYMPHOCYTES # BLD AUTO: 1.1 K/UL (ref 1–4.8)
LYMPHOCYTES NFR BLD: 13.4 % (ref 18–48)
MAGNESIUM SERPL-MCNC: 2 MG/DL (ref 1.6–2.6)
MCH RBC QN AUTO: 27.1 PG (ref 27–31)
MCHC RBC AUTO-ENTMCNC: 30.9 G/DL (ref 32–36)
MCV RBC AUTO: 88 FL (ref 82–98)
MICROSCOPIC COMMENT: ABNORMAL
MONOCYTES # BLD AUTO: 0.7 K/UL (ref 0.3–1)
MONOCYTES NFR BLD: 8.5 % (ref 4–15)
NEUTROPHILS # BLD AUTO: 6.2 K/UL (ref 1.8–7.7)
NEUTROPHILS NFR BLD: 72.7 % (ref 38–73)
NITRITE UR QL STRIP: NEGATIVE
NON-SQ EPI CELLS #/AREA URNS AUTO: 1 /HPF
NRBC BLD-RTO: 0 /100 WBC
PH UR STRIP: 6 [PH] (ref 5–8)
PHOSPHATE SERPL-MCNC: 3.7 MG/DL (ref 2.7–4.5)
PLATELET # BLD AUTO: 238 K/UL (ref 150–450)
PMV BLD AUTO: 10.4 FL (ref 9.2–12.9)
POTASSIUM SERPL-SCNC: 3.4 MMOL/L (ref 3.5–5.1)
POTASSIUM SERPL-SCNC: 3.4 MMOL/L (ref 3.5–5.1)
POTASSIUM SERPL-SCNC: 3.5 MMOL/L (ref 3.5–5.1)
PROT SERPL-MCNC: 4.7 G/DL (ref 6–8.4)
PROT SERPL-MCNC: 4.7 G/DL (ref 6–8.4)
PROT SERPL-MCNC: 5.3 G/DL (ref 6–8.4)
PROT UR QL STRIP: NEGATIVE
PROT UR-MCNC: <7 MG/DL (ref 0–15)
PROT/CREAT UR: NORMAL MG/G{CREAT} (ref 0–0.2)
RBC # BLD AUTO: 3.91 M/UL (ref 4–5.4)
RBC #/AREA URNS AUTO: 2 /HPF (ref 0–4)
SODIUM SERPL-SCNC: 141 MMOL/L (ref 136–145)
SODIUM SERPL-SCNC: 142 MMOL/L (ref 136–145)
SODIUM SERPL-SCNC: 142 MMOL/L (ref 136–145)
SP GR UR STRIP: 1 (ref 1–1.03)
SQUAMOUS #/AREA URNS AUTO: 4 /HPF
URN SPEC COLLECT METH UR: ABNORMAL
WBC # BLD AUTO: 8.46 K/UL (ref 3.9–12.7)
WBC #/AREA URNS AUTO: 6 /HPF (ref 0–5)

## 2023-06-13 PROCEDURE — 82570 ASSAY OF URINE CREATININE: CPT | Performed by: STUDENT IN AN ORGANIZED HEALTH CARE EDUCATION/TRAINING PROGRAM

## 2023-06-13 PROCEDURE — 36415 COLL VENOUS BLD VENIPUNCTURE: CPT | Performed by: EMERGENCY MEDICINE

## 2023-06-13 PROCEDURE — 25000003 PHARM REV CODE 250: Performed by: NURSE PRACTITIONER

## 2023-06-13 PROCEDURE — 81001 URINALYSIS AUTO W/SCOPE: CPT | Performed by: STUDENT IN AN ORGANIZED HEALTH CARE EDUCATION/TRAINING PROGRAM

## 2023-06-13 PROCEDURE — 85025 COMPLETE CBC W/AUTO DIFF WBC: CPT | Performed by: EMERGENCY MEDICINE

## 2023-06-13 PROCEDURE — 25000003 PHARM REV CODE 250: Performed by: INTERNAL MEDICINE

## 2023-06-13 PROCEDURE — 82140 ASSAY OF AMMONIA: CPT | Performed by: NURSE PRACTITIONER

## 2023-06-13 PROCEDURE — 97116 GAIT TRAINING THERAPY: CPT | Mod: CQ

## 2023-06-13 PROCEDURE — 83735 ASSAY OF MAGNESIUM: CPT | Performed by: EMERGENCY MEDICINE

## 2023-06-13 PROCEDURE — 99223 PR INITIAL HOSPITAL CARE,LEVL III: ICD-10-PCS | Mod: ,,, | Performed by: INTERNAL MEDICINE

## 2023-06-13 PROCEDURE — 80053 COMPREHEN METABOLIC PANEL: CPT | Performed by: EMERGENCY MEDICINE

## 2023-06-13 PROCEDURE — 20600001 HC STEP DOWN PRIVATE ROOM

## 2023-06-13 PROCEDURE — 36415 COLL VENOUS BLD VENIPUNCTURE: CPT | Performed by: NURSE PRACTITIONER

## 2023-06-13 PROCEDURE — 92507 TX SP LANG VOICE COMM INDIV: CPT

## 2023-06-13 PROCEDURE — 51798 US URINE CAPACITY MEASURE: CPT

## 2023-06-13 PROCEDURE — 63600175 PHARM REV CODE 636 W HCPCS: Performed by: NURSE PRACTITIONER

## 2023-06-13 PROCEDURE — 99223 1ST HOSP IP/OBS HIGH 75: CPT | Mod: ,,, | Performed by: INTERNAL MEDICINE

## 2023-06-13 PROCEDURE — 84100 ASSAY OF PHOSPHORUS: CPT | Performed by: EMERGENCY MEDICINE

## 2023-06-13 PROCEDURE — 80053 COMPREHEN METABOLIC PANEL: CPT | Mod: 91 | Performed by: NURSE PRACTITIONER

## 2023-06-13 PROCEDURE — 99232 SBSQ HOSP IP/OBS MODERATE 35: CPT | Mod: ,,, | Performed by: INTERNAL MEDICINE

## 2023-06-13 PROCEDURE — 99232 PR SUBSEQUENT HOSPITAL CARE,LEVL II: ICD-10-PCS | Mod: ,,, | Performed by: INTERNAL MEDICINE

## 2023-06-13 RX ORDER — ASPIRIN 325 MG
325 TABLET ORAL EVERY 6 HOURS PRN
Status: DISCONTINUED | OUTPATIENT
Start: 2023-06-13 | End: 2023-06-15 | Stop reason: HOSPADM

## 2023-06-13 RX ADMIN — HEPARIN SODIUM 5000 UNITS: 5000 INJECTION INTRAVENOUS; SUBCUTANEOUS at 05:06

## 2023-06-13 RX ADMIN — LACTULOSE 15 G: 20 SOLUTION ORAL at 12:06

## 2023-06-13 RX ADMIN — ASPIRIN 325 MG ORAL TABLET 325 MG: 325 PILL ORAL at 08:06

## 2023-06-13 RX ADMIN — HEPARIN SODIUM 5000 UNITS: 5000 INJECTION INTRAVENOUS; SUBCUTANEOUS at 02:06

## 2023-06-13 RX ADMIN — ASPIRIN 325 MG ORAL TABLET 325 MG: 325 PILL ORAL at 12:06

## 2023-06-13 RX ADMIN — HEPARIN SODIUM 5000 UNITS: 5000 INJECTION INTRAVENOUS; SUBCUTANEOUS at 09:06

## 2023-06-13 RX ADMIN — LACTULOSE 15 G: 20 SOLUTION ORAL at 02:06

## 2023-06-13 RX ADMIN — SILODOSIN 4 MG: 4 CAPSULE ORAL at 09:06

## 2023-06-13 RX ADMIN — AMOXICILLIN 500 MG: 500 CAPSULE ORAL at 09:06

## 2023-06-13 RX ADMIN — LACTULOSE 15 G: 20 SOLUTION ORAL at 05:06

## 2023-06-13 NOTE — NURSING
Resting in bed, AAOx4. Resp even and unlabored on O2 at 2L via NC. Right temporal incision, intact with sutures. Spouse at bedside.

## 2023-06-13 NOTE — HPI
Tona Zafar is a 68 y.o. F with arthritis, ischemic colitis, tegmen defect /encephalocele s/p left temporal craniotomy and defect repair 6/5/23 who transferred to [facility] for higher level of care. Originally discharge 6/7/23 following surgery with concepcion for urinary retention, tamsulosin, oxycodone for post-op pain, gabapentin, and Keppra for sz ppx. Developed worsening lethargy and AMS and couldn't be around the following day. Went to [facility] ED and found to be in septic shock 2/2 Enterococcus UTI requiring Levo. Mentation improved with Narcan infusion. Associated NSTEMI, shock liver, hypoxemic respiratory failure requiring face mask, and KAROLINA with Cr 1.4 from baseline 0.6-0.8. Has been increasing each day > 3.2 > 3.7 > 4.2 > 4.3 > 4.5 today. Receiving LR at 75 ml/hr since 6/10/23. Received abx including vanc and CTX the first 2 days and amoxicillin after. No NSAIDs or contrast exposure. Has had some urinary retention requiring straight caths here. None in the last 24 hours. Weaned off Levo and stepped down 2 days ago.

## 2023-06-13 NOTE — PLAN OF CARE
Pt Aox4 and follows commands. [place]. 2L NC. SR on tele. Voiding spontaneously via bedside commode. Multiple loose stools. X1 assist. Q6 bladder scans. Lactated Ringers infusing at 75 cc/hr. Bed in lowest position and call light within reach.  at bedside.

## 2023-06-13 NOTE — PLAN OF CARE
Problem: Adult Inpatient Plan of Care  Goal: Plan of Care Review  Outcome: Ongoing, Progressing  Goal: Patient-Specific Goal (Individualized)  Outcome: Ongoing, Progressing  Goal: Absence of Hospital-Acquired Illness or Injury  Outcome: Ongoing, Progressing  Goal: Optimal Comfort and Wellbeing  Outcome: Ongoing, Progressing  Goal: Readiness for Transition of Care  Outcome: Ongoing, Progressing     Problem: Skin Injury Risk Increased  Goal: Skin Health and Integrity  Outcome: Ongoing, Progressing     Problem: Adjustment to Illness (Stroke, Hemorrhagic)  Goal: Optimal Coping  Outcome: Ongoing, Progressing     Problem: Bowel Elimination Impaired (Stroke, Hemorrhagic)  Goal: Effective Bowel Elimination  Outcome: Ongoing, Progressing     Problem: Cerebral Tissue Perfusion (Stroke, Hemorrhagic)  Goal: Optimal Cerebral Tissue Perfusion  Outcome: Ongoing, Progressing     Problem: Cognitive Impairment (Stroke, Hemorrhagic)  Goal: Optimal Cognitive Function  Outcome: Ongoing, Progressing     Problem: Communication Impairment (Stroke, Hemorrhagic)  Goal: Effective Communication Skills  Outcome: Ongoing, Progressing     Problem: Functional Ability Impaired (Stroke, Hemorrhagic)  Goal: Optimal Functional Ability  Outcome: Ongoing, Progressing     Problem: Pain (Stroke, Hemorrhagic)  Goal: Acceptable Pain Control  Outcome: Ongoing, Progressing     Problem: Respiratory Compromise (Stroke, Hemorrhagic)  Goal: Effective Oxygenation and Ventilation  Outcome: Ongoing, Progressing     Problem: Sensorimotor Impairment (Stroke, Hemorrhagic)  Goal: Improved Sensorimotor Function  Outcome: Ongoing, Progressing     Problem: Swallowing Impairment (Stroke, Hemorrhagic)  Goal: Optimal Eating and Swallowing Without Aspiration  Outcome: Ongoing, Progressing     Problem: Urinary Elimination Impaired (Stroke, Hemorrhagic)  Goal: Effective Urinary Elimination  Outcome: Ongoing, Progressing     Problem: Fluid and Electrolyte Imbalance (Acute  Kidney Injury/Impairment)  Goal: Fluid and Electrolyte Balance  Outcome: Ongoing, Progressing     Problem: Oral Intake Inadequate (Acute Kidney Injury/Impairment)  Goal: Optimal Nutrition Intake  Outcome: Ongoing, Progressing     Problem: Renal Function Impairment (Acute Kidney Injury/Impairment)  Goal: Effective Renal Function  Outcome: Ongoing, Progressing     Problem: Adjustment to Illness (Delirium)  Goal: Optimal Coping  Outcome: Ongoing, Progressing     Problem: Altered Behavior (Delirium)  Goal: Improved Behavioral Control  Outcome: Ongoing, Progressing     Problem: Attention and Thought Clarity Impairment (Delirium)  Goal: Improved Attention and Thought Clarity  Outcome: Ongoing, Progressing     Problem: Sleep Disturbance (Delirium)  Goal: Improved Sleep  Outcome: Ongoing, Progressing     Problem: Infection  Goal: Absence of Infection Signs and Symptoms  Outcome: Ongoing, Progressing     Problem: Impaired Wound Healing  Goal: Optimal Wound Healing  Outcome: Ongoing, Progressing     Problem: Fall Injury Risk  Goal: Absence of Fall and Fall-Related Injury  Outcome: Ongoing, Progressing

## 2023-06-13 NOTE — HPI
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 She lives in [place], [place]. She is . Her primary care physician is Dr. Hubert Barrera.               She was hospitalized at [facility] from 6/5/2023 to 6/7/2023 for tegmen defect. She was prescribed gabapentin, levetiracetam, and oxycodone-acetaminophen  mg. She was also discharged with an indwelling Cedillo catheter and prescribed tamsulosin for urinary retention.               She presented to [facility] Emergency Department on 6/8/2023 with altered mental status. Her  noted that she was weak and could not stand on the way home from the hospital. He gave her gabapentin and oxycodone-acetaminophen at 18:00. He gave a subsequent dose at 02:00 on the day of presentation. At 06:00, he found her unresponsive and sweating. He called emergency medical services. Oxygen saturation was initially in the low 50s. She was given 4 mg of naloxone, resulting in agitation. She was somnolent and aroused to physical stimuli. She could follow basic commands when she was awake. Mily coma scale was around 10. She was initially hypotensive and required norepinephrine. In the emergency department, she was given lactated Ringer's solution and naloxone. Afterward, she was disoriented but able to say her name and recognize her family. She became somnolent again and responded to naloxone. She was placed on naloxone infusion. She was found to have  acute liver failure, acute kidney injury, elevated troponin, and urinary tract infection. She was given broad-spectrum antibiotics. Head CT showed postsurgical changes with decreasing pneumocephalus. She was transferred to [facility] and admitted to Neuro Critical Care.

## 2023-06-13 NOTE — PROGRESS NOTES
[facility] - Intensive Care ([address] Medicine  Progress Note    Patient Name: Tona Zafar  MRN: [id number]  Patient Class: IP- Inpatient   Admission Date: 6/8/2023  Length of Stay: 5 days  Attending Physician: Salvatore Horn MD  Primary Care Provider: Hubert Barrera MD        Subjective:     Principal Problem:Acute encephalopathy        HPI:  No notes on file    Overview/Hospital Course:  No notes on file    Interval History: LFTS's are improving but renal function is not, Cr is 4.5, probable resolving ATN, but will consult nephrology. Looks better - MS seems to be at baseline, plan is still to eventually dc to home.  Review of Systems  Objective:     Vital Signs (Most Recent):  Temp: 98.9 °F (37.2 °C) (06/13/23 0737)  Pulse: 70 (06/13/23 0737)  Resp: 15 (06/13/23 0737)  BP: (!) 147/77 (06/13/23 0737)  SpO2: 98 % (06/13/23 0737) Vital Signs (24h Range):  Temp:  [97.8 °F (36.6 °C)-99.4 °F (37.4 °C)] 98.9 °F (37.2 °C)  Pulse:  [70-81] 70  Resp:  [15-20] 15  SpO2:  [94 %-98 %] 98 %  BP: (147-167)/(72-82) 147/77     Weight: 86.6 kg (190 lb 14.7 oz)  Body mass index is 27.39 kg/m².    Intake/Output Summary (Last 24 hours) at 6/13/2023 0918  Last data filed at 6/13/2023 0430  Gross per 24 hour   Intake 358 ml   Output 876 ml   Net -518 ml      Physical Exam  Vitals and nursing note reviewed.   Constitutional:       Appearance: Normal appearance.   HENT:      Head: Normocephalic.      Nose: Nose normal.      Mouth/Throat:      Mouth: Mucous membranes are moist.      Pharynx: Oropharynx is clear.   Eyes:      Pupils: Pupils are equal, round, and reactive to light.   Cardiovascular:      Rate and Rhythm: Normal rate and regular rhythm.      Pulses: Normal pulses.      Heart sounds: Normal heart sounds.   Pulmonary:      Effort: Pulmonary effort is normal.      Breath sounds: Normal breath sounds.   Abdominal:      General: Bowel sounds are normal.      Palpations: Abdomen is soft.   Musculoskeletal:          General: Swelling present. Normal range of motion.   Skin:     General: Skin is warm and dry.      Capillary Refill: Capillary refill takes 2 to 3 seconds.   Neurological:      Mental Status: She is alert.      Comments: GCS 15  AAO X2-3  PERRL  MUNIZ to command and spontaneously  Sensation Intact X4       MELD-Na: 21 at 6/13/2023  7:42 AM  MELD: 21 at 6/13/2023  7:42 AM  Calculated from:  Serum Creatinine: 4.5 mg/dL (Using max of 4 mg/dL) at 6/13/2023  7:42 AM  Serum Sodium: 142 mmol/L (Using max of 137 mmol/L) at 6/13/2023  7:42 AM  Total Bilirubin: 0.5 mg/dL (Using min of 1 mg/dL) at 6/13/2023  7:42 AM  INR(ratio): 1.1 at 6/12/2023  5:27 AM      Significant Labs:  CBC:  Recent Labs   Lab 06/13/23  0354   WBC 8.46   HGB 10.6*   HCT 34.3*        CMP:  Recent Labs   Lab 06/12/23  1636 06/13/23  0354 06/13/23  0742    141 142   K 3.5 3.4* 3.5    110 111*   CO2 24 22* 21*   * 98 99   BUN 36* 35* 35*   CREATININE 4.2* 4.3* 4.5*   CALCIUM 8.2* 8.6* 8.3*   PROT 5.0*  5.0* 5.3* 4.7*  4.7*   ALBUMIN 2.4*  2.4* 2.5* 2.2*  2.2*   BILITOT 0.5  0.5 0.6 0.5  0.5   ALKPHOS 125  125 133 116  116   *  177* 136* 110*  110*   ALT 1,111*  1,111* 1,016* 870*  870*   ANIONGAP 7* 9 10     PTINR:  Recent Labs   Lab 06/12/23  0527   INR 1.1       Significant Procedures:   Dobutamine Stress Test with Color Flow: No results found for this or any previous visit.    None      Assessment/Plan:      * Acute encephalopathy  Presented as transfer from [facility] after arrived encephalopathic there, initialy with unknown cause. She arrived hypotensive and hypoxic, nstemi, shock liver, karolina, and acute cystits from enterococcus uti                 Overdose  inittial wakefulness improved with narcan at OSH, gtt was started and continued to tranfers to OM.   Stopped shortly after arrival. Follow exam closely       KAROLINA (acute kidney injury)  Presented to OSH with new KAROLINA, Crt 1.4, worsening to > 1.9 GFR 20's  after transfer  Follow I/O, Follow UO  IVF started, bolus given on arrival and it appears none was given prior to transfer  Given worsening KAROLINA, Liver function and Cardiac enzymes, believe global hypoxic event      Acute liver failure  Presented at OSH with jamin increase in AST/ALT over pst 24hours,   AST/ALT normal on 6/7 and acutely increased to ASt 3569, ALT 2074 in 24 hrs  AST/ALT continuing to trend down  Bili is WNL  Ammonia trending downHepatology consulted  Liver US with doppler pending  Coags currently WNL  AST/ALT trending down  -thoughts are global hypoxic event      Tegmen defect of base of skull  S/p tegmentum defect repair on 6/6 wit NSGY  Step down and discharge on 6/7 by NSGY     NSGY consulted after transfer to [facility]  Incision left temple is C/D/I, and no fresh draiange noted to the left ear, some dried flecks of blood.        VTE Risk Mitigation (From admission, onward)         Ordered     heparin (porcine) injection 5,000 Units  Every 8 hours         06/11/23 1026     IP VTE HIGH RISK PATIENT  Once         06/08/23 2225     Place sequential compression device  Until discontinued         06/08/23 2225                Discharge Planning   FARZANEH: 6/19/2023     Code Status: Full Code   Is the patient medically ready for discharge?: No    Reason for patient still in hospital (select all that apply): Patient unstable  Discharge Plan A: Home Health   Discharge Delays: None known at this time              Salvatore Horn MD  Department of Hospital Medicine   [facility] - Intensive Care ([place]-[facility])

## 2023-06-13 NOTE — SUBJECTIVE & OBJECTIVE
Interval History: LFTS's are improving but renal function is not, Cr is 4.5, probable resolving ATN, but will consult nephrology. Looks better - MS seems to be at baseline, plan is still to eventually dc to home.  Review of Systems  Objective:     Vital Signs (Most Recent):  Temp: 98.9 °F (37.2 °C) (06/13/23 0737)  Pulse: 70 (06/13/23 0737)  Resp: 15 (06/13/23 0737)  BP: (!) 147/77 (06/13/23 0737)  SpO2: 98 % (06/13/23 0737) Vital Signs (24h Range):  Temp:  [97.8 °F (36.6 °C)-99.4 °F (37.4 °C)] 98.9 °F (37.2 °C)  Pulse:  [70-81] 70  Resp:  [15-20] 15  SpO2:  [94 %-98 %] 98 %  BP: (147-167)/(72-82) 147/77     Weight: 86.6 kg (190 lb 14.7 oz)  Body mass index is 27.39 kg/m².    Intake/Output Summary (Last 24 hours) at 6/13/2023 0918  Last data filed at 6/13/2023 0430  Gross per 24 hour   Intake 358 ml   Output 876 ml   Net -518 ml      Physical Exam  Vitals and nursing note reviewed.   Constitutional:       Appearance: Normal appearance.   HENT:      Head: Normocephalic.      Nose: Nose normal.      Mouth/Throat:      Mouth: Mucous membranes are moist.      Pharynx: Oropharynx is clear.   Eyes:      Pupils: Pupils are equal, round, and reactive to light.   Cardiovascular:      Rate and Rhythm: Normal rate and regular rhythm.      Pulses: Normal pulses.      Heart sounds: Normal heart sounds.   Pulmonary:      Effort: Pulmonary effort is normal.      Breath sounds: Normal breath sounds.   Abdominal:      General: Bowel sounds are normal.      Palpations: Abdomen is soft.   Musculoskeletal:         General: Swelling present. Normal range of motion.   Skin:     General: Skin is warm and dry.      Capillary Refill: Capillary refill takes 2 to 3 seconds.   Neurological:      Mental Status: She is alert.      Comments: GCS 15  AAO X2-3  PERRL  MUNIZ to command and spontaneously  Sensation Intact X4       MELD-Na: 21 at 6/13/2023  7:42 AM  MELD: 21 at 6/13/2023  7:42 AM  Calculated from:  Serum Creatinine: 4.5 mg/dL (Using max  of 4 mg/dL) at 6/13/2023  7:42 AM  Serum Sodium: 142 mmol/L (Using max of 137 mmol/L) at 6/13/2023  7:42 AM  Total Bilirubin: 0.5 mg/dL (Using min of 1 mg/dL) at 6/13/2023  7:42 AM  INR(ratio): 1.1 at 6/12/2023  5:27 AM      Significant Labs:  CBC:  Recent Labs   Lab 06/13/23  0354   WBC 8.46   HGB 10.6*   HCT 34.3*        CMP:  Recent Labs   Lab 06/12/23  1636 06/13/23  0354 06/13/23  0742    141 142   K 3.5 3.4* 3.5    110 111*   CO2 24 22* 21*   * 98 99   BUN 36* 35* 35*   CREATININE 4.2* 4.3* 4.5*   CALCIUM 8.2* 8.6* 8.3*   PROT 5.0*  5.0* 5.3* 4.7*  4.7*   ALBUMIN 2.4*  2.4* 2.5* 2.2*  2.2*   BILITOT 0.5  0.5 0.6 0.5  0.5   ALKPHOS 125  125 133 116  116   *  177* 136* 110*  110*   ALT 1,111*  1,111* 1,016* 870*  870*   ANIONGAP 7* 9 10     PTINR:  Recent Labs   Lab 06/12/23  0527   INR 1.1       Significant Procedures:   Dobutamine Stress Test with Color Flow: No results found for this or any previous visit.    None

## 2023-06-13 NOTE — PT/OT/SLP PROGRESS
"Physical Therapy Treatment    Patient Name:  Tona Zafar   MRN:  [id number]    Recommendations:     Discharge Recommendations: home health PT  Discharge Equipment Recommendations: none  Barriers to discharge: None    Assessment:     Tona Zafar is a 68 y.o. female admitted with a medical diagnosis of Acute encephalopathy.  She presents with the following impairments/functional limitations: weakness, impaired endurance, impaired self care skills, impaired functional mobility, gait instability, impaired balance, decreased upper extremity function, decreased lower extremity function, impaired cardiopulmonary response to activity requiring light assistance and verbal cues for transfers and gait to prevent falls due to weakness, instability, fatigue.  Pt remains motivated to participate in PT session and will cont to benefit from skilled PT intervention.  .    Rehab Prognosis: Good; patient would benefit from acute skilled PT services to address these deficits and reach maximum level of function.    Recent Surgery: * No surgery found *      Plan:     During this hospitalization, patient to be seen 3 x/week to address the identified rehab impairments via gait training, therapeutic activities, therapeutic exercises, neuromuscular re-education and progress toward the following goals:    Plan of Care Expires:  07/09/23    Subjective     Chief Complaint: fatigue  Pain/Comfort:  Pain Rating 1: 0/10  Pain Rating Post-Intervention 1: 0/10      Objective:     Communicated with nurse (Ankush) prior to session.  Patient found up in chair with oxygen, pulse ox (continuous), telemetry with  present upon PT entry to room.     General Precautions: Standard, fall  Orthopedic Precautions: N/A  Braces: N/A  Respiratory Status: Nasal cannula, flow 2 L/min     Functional Mobility:  Transfers:     Sit to Stand:  from BS chair with CGA with no AD  Gait: initially with no AD, however, pt reaching for wall/furniture and stating "I " "feel like I need something to hold onto" then RW was used with CGA for balance and safety 120ft and then 80ft with seated rest break in between trials. Pt demonstrates slight flexed posture, decreased B step length, and B LE's instability towards the end of gait trials      AM-PAC 6 CLICK MOBILITY  Turning over in bed (including adjusting bedclothes, sheets and blankets)?: 4  Sitting down on and standing up from a chair with arms (e.g., wheelchair, bedside commode, etc.): 3  Moving from lying on back to sitting on the side of the bed?: 3  Moving to and from a bed to a chair (including a wheelchair)?: 3  Need to walk in hospital room?: 3  Climbing 3-5 steps with a railing?: 3  Basic Mobility Total Score: 19       Treatment & Education:  Patient provided with daily orientation and goals of this PT session. They were educated to call for assistance and to transfer with hospital staff only.  Also, pt was educated on the effects of prolonged immobility and the importance of performing OOB activity and exercises to promote healing and reduce recovery time    Patient left up in chair with all lines intact, call button in reach, nurse notified, and  present..    GOALS:   Multidisciplinary Problems       Physical Therapy Goals          Problem: Physical Therapy    Goal Priority Disciplines Outcome Goal Variances Interventions   Physical Therapy Goal     PT, PT/OT Ongoing, Progressing     Description: Goals to be met by: 23     Patient will increase functional independence with mobility by performin. Supine to sit with Modified [place]  2. Sit to supine with Modified [place]  3. Sit to stand transfer with Modified [place]  4. Bed to chair transfer with Modified [place] using LRAD if needed  5. Gait  x 150 feet with Supervision using LRAD if needed.   6. Lower extremity exercise program x10 reps per handout, with supervision                         Time Tracking:     PT Received On: " 06/13/23  PT Start Time: 1042     PT Stop Time: 1103  PT Total Time (min): 21 min     Billable Minutes: Gait Training 21    Treatment Type: Treatment  PT/PTA: PTA     Number of PTA visits since last PT visit: 1 06/13/2023

## 2023-06-13 NOTE — SUBJECTIVE & OBJECTIVE
Past Medical History:   Diagnosis Date    Abnormal bone density screening     Colon polyps     Degenerative joint disease 02/05/2007    Diverticulosis     Gastroparesis 2018    History of chicken pox     History of depression 02/05/2007    Ischemic colitis 2020    Dr. Drew    Lumbar disc disease        Past Surgical History:   Procedure Laterality Date    APPENDECTOMY      AUGMENTATION OF BREAST      BREAST SURGERY Bilateral 2005    Implants    CHOLECYSTECTOMY  06/2012    COLONOSCOPY  05/12/2020    Dr. Drew, in procedures; repeat in 6 months to check for healing of ischemic colitis    COLONOSCOPY  05/01/2018    Dr. Drew, in procedures    DENTAL SURGERY      DILATION AND CURETTAGE OF UTERUS      HIP SURGERY Right 2007    LUMBAR PUNCTURE N/A 6/5/2023    Procedure: LUMBAR PUNCTURE;  Surgeon: Rangel Perez DO;  Location: [facility] OR 2ND FLR;  Service: Neurosurgery;  Laterality: N/A;    REPAIR, CSF LEAK, CRANIUM, MIDDLE FOSSA APPROACH Left 6/5/2023    Procedure: REPAIR, CSF LEAK, CRANIUM, MIDDLE FOSSA APPROACH;  Surgeon: Gasper Griffith MD;  Location: [facility] OR 2ND FLR;  Service: ENT;  Laterality: Left;    REPAIR, CSF LEAK, CRANIUM, MIDDLE FOSSA APPROACH Left 6/5/2023    Procedure: REPAIR, CSF LEAK, CRANIUM, MIDDLE FOSSA APPROACH;  Surgeon: Rangel Perez DO;  Location: [facility] OR 2ND FLR;  Service: Neurosurgery;  Laterality: Left;    TONSILLECTOMY      UPPER GASTROINTESTINAL ENDOSCOPY  04/24/2018    Dr. Drew, in media    WRIST SURGERY Left 01/2014       Review of patient's allergies indicates:  No Known Allergies  Current Facility-Administered Medications   Medication Frequency    aspirin tablet 325 mg Q6H PRN    heparin (porcine) injection 5,000 Units Q8H    hydrOXYzine HCL tablet 25 mg TID PRN    labetalol 20 mg/4 mL (5 mg/mL) IV syring Q15 Min PRN    lactated ringers infusion Continuous    lactulose 20 gram/30 mL solution Soln 15 g Q6H    magnesium oxide tablet 800 mg PRN    magnesium oxide tablet 800 mg PRN     ondansetron injection 4 mg Q6H PRN    oxyCODONE 5 mg/5 mL solution 2.5 mg Q4H PRN    potassium bicarbonate disintegrating tablet 35 mEq PRN    potassium bicarbonate disintegrating tablet 50 mEq PRN    potassium bicarbonate disintegrating tablet 60 mEq PRN    potassium, sodium phosphates 280-160-250 mg packet 2 packet PRN    potassium, sodium phosphates 280-160-250 mg packet 2 packet PRN    potassium, sodium phosphates 280-160-250 mg packet 2 packet PRN    silodosin capsule 4 mg Daily    sodium chloride 0.9% flush 10 mL PRN     Family History       Problem Relation (Age of Onset)    Colon cancer Maternal Grandfather    Diabetes Mother    Throat cancer Father          Tobacco Use    Smoking status: Former     Types: Cigarettes    Smokeless tobacco: Never   Substance and Sexual Activity    Alcohol use: Yes     Alcohol/week: 1.0 standard drink     Types: 1 Glasses of wine per week     Comment: occasional    Drug use: No    Sexual activity: Yes     Birth control/protection: Post-menopausal     Review of Systems   Constitutional:  Positive for fatigue. Negative for chills and fever.   HENT:  Negative for sore throat.    Respiratory:  Negative for cough and shortness of breath.    Cardiovascular:  Negative for chest pain and leg swelling.   Gastrointestinal:  Negative for abdominal pain, constipation, diarrhea, nausea and vomiting.   Genitourinary:  Positive for difficulty urinating. Negative for dysuria.   Musculoskeletal:  Negative for myalgias.   Skin:  Negative for rash.   Neurological:  Negative for dizziness and headaches.   Psychiatric/Behavioral:  Negative for confusion.    Objective:     Vital Signs (Most Recent):  Temp: 98.8 °F (37.1 °C) (06/13/23 1136)  Pulse: 79 (06/13/23 1151)  Resp: (!) 21 (06/13/23 1136)  BP: 137/67 (06/13/23 1136)  SpO2: 95 % (06/13/23 1151) Vital Signs (24h Range):  Temp:  [97.8 °F (36.6 °C)-99.4 °F (37.4 °C)] 98.8 °F (37.1 °C)  Pulse:  [70-81] 79  Resp:  [15-21] 21  SpO2:  [94 %-98 %] 95  %  BP: (137-167)/(67-82) 137/67     Weight: 86.6 kg (190 lb 14.7 oz) (06/09/23 1118)  Body mass index is 27.39 kg/m².  Body surface area is 2.07 meters squared.    I/O last 3 completed shifts:  In: 1163.2 [P.O.:358; I.V.:805.2]  Out: 1276 [Urine:1275; Stool:1]     Physical Exam  Constitutional:       General: She is not in acute distress.     Appearance: Normal appearance.   HENT:      Head: Normocephalic and atraumatic.      Mouth/Throat:      Mouth: Mucous membranes are moist.      Pharynx: Oropharynx is clear.   Eyes:      Extraocular Movements: Extraocular movements intact.      Pupils: Pupils are equal, round, and reactive to light.   Cardiovascular:      Rate and Rhythm: Normal rate and regular rhythm.      Pulses: Normal pulses.      Heart sounds: Normal heart sounds.   Pulmonary:      Effort: Pulmonary effort is normal. No respiratory distress.      Breath sounds: Normal breath sounds.   Abdominal:      General: Abdomen is flat. Bowel sounds are normal. There is no distension.      Palpations: Abdomen is soft.   Musculoskeletal:         General: No swelling.      Cervical back: Neck supple.   Skin:     General: Skin is warm and dry.      Capillary Refill: Capillary refill takes less than 2 seconds.   Neurological:      General: No focal deficit present.      Mental Status: She is alert and oriented to person, place, and time. Mental status is at baseline.   Psychiatric:         Mood and Affect: Mood normal.         Behavior: Behavior normal.         Thought Content: Thought content normal.         Judgment: Judgment normal.        Significant Labs:  All labs within the past 24 hours have been reviewed.    Significant Imaging:  Reviewed

## 2023-06-13 NOTE — PT/OT/SLP PROGRESS
Speech Language Pathology Treatment    Patient Name:  Tona Zafar   MRN:  [id number]  Admitting Diagnosis: Acute encephalopathy    Recommendations:                 General Recommendations:  Speech/language therapy  Diet recommendations:  Mechanical soft, Liquid Diet Level: Thin   Aspiration Precautions: Small bites/sips and Standard aspiration precautions   General Precautions: Standard, fall      Assessment:     Tona Zafar is a 68 y.o. female with an SLP diagnosis of Cognitive-Linguistic Impairment.      Subjective     Awake/alert    Pain/Comfort:  Pain Rating 1: 0/10  Pain Rating Post-Intervention 1: 0/10    Respiratory Status: Room air    Objective:     Has the patient been evaluated by SLP for swallowing?   Yes  Keep patient NPO? No     Pt repositioned upright in bed with spouse at bedside. She completed reading, writing and visual spatial tasks with 100% accy. Reasoning task completed with 90% accy and min cues. Per spouse, pt close to baseline at this time, however also expressed she is slightly off from baseline. Ongoing education provided to pt regarding need for ongoing therapy, and POC at this time.     Goals:   Multidisciplinary Problems       SLP Goals          Problem: SLP    Goal Priority Disciplines Outcome   SLP Goal     SLP Ongoing, Progressing   Description: Speech Pathology Goals  To be met by 6/26/23    1. Pt will complete ongoing assessment of reading, writing, and visuospatial skills   2. Pt will answer complex Y/N questions with 80% accuracy given MIN verbal cueing  3. Pt will identify x10 items within a concrete category  4. Pt will answer reasoning questions with 90% accuracy given MIN verbal cueing                                     Plan:     Patient to be seen:  3 x/week   Plan of Care expires:     Plan of Care reviewed with:  patient, spouse   SLP Follow-Up:  Yes       Discharge recommendations:  other (see comments)     Time Tracking:     SLP Treatment Date:   06/13/23  Speech  Start Time:  0828  Speech Stop Time:  0840     Speech Total Time (min):  12 min    Billable Minutes: Speech Therapy Individual 12    06/13/2023

## 2023-06-13 NOTE — ASSESSMENT & PLAN NOTE
-- KAROLINA likely ischemic ATN in s/o shock  -- Cr may still be rising until peaks and begins to decrease  -- Possibly obstructive component as reported urinary retention intermittently here since concepcion was d/c  -- Remains hemodynamically stable  -- No exposure to contrast, NSAIDs, or other nephrotoxins noted  -- Euvolemic on exam. Good UOP  -- UA on 6/9 with hematuria, proteinuria, pyuria, bacteria    Recommendations:  -- No indication for RRT  -- F/u RP US  -- F/u urine protein creatinine ratio and UA  -- Spinning urine today  -- Can stop LR. Patient tolerating orals and euvolemic on exam  -- Agree with silodosin  -- Monitor for further urinary retention. May need medications adjusting or replacement of concepcion to decompress with urology f/u if contributing to rise in Cr

## 2023-06-13 NOTE — HOSPITAL COURSE
Gabapentin and levetiracetam were discontinued. She was given oxycodone as needed but at a lower dose. She had elevated ammonia. She was thought to have hepatic encephalopathy. She was given ceftriaxone for her urinary traction infection. Mental status returned to baseline on 6/13/2023. Cedillo catheter was able to be removed and she urinated on her own. Liver enzymes were improving. Creatinine had not yet improved. Nephrology was consulted and spun her urine, finding evidence of acute tubular necrosis, and recommended observing. Labs will be repeated outpatient.

## 2023-06-13 NOTE — CONSULTS
[facility] ([place] [address])  Nephrology  Consult Note    Patient Name: Tona Zafar  MRN: [id number]  Admission Date: 6/8/2023  Hospital Length of Stay: 5 days  Attending Provider: Salvatore Horn MD   Primary Care Physician: Hubert Barrera MD  Principal Problem:Acute encephalopathy    Inpatient consult to Nephrology  Consult performed by: Mony Murrell DO  Consult ordered by: Salvatore Horn MD        Subjective:     HPI: Tona Zafar is a 68 y.o. 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 Originally discharge 6/7/23 following surgery with concepcion for urinary retention, tamsulosin, oxycodone for post-op pain, gabapentin, and Keppra for sz ppx. Developed worsening lethargy and AMS and couldn't be around the following day. Went to [facility] ED and found to be in septic shock 2/2 Enterococcus UTI requiring Levo. Mentation improved with Narcan infusion. Associated NSTEMI, shock liver, hypoxemic respiratory failure requiring face mask, and KAROLINA with Cr 1.4 from baseline 0.6-0.8. Has been increasing each day > 3.2 > 3.7 > 4.2 > 4.3 > 4.5 today. Receiving LR at 75 ml/hr since 6/10/23. Received abx including vanc and CTX the first 2 days and amoxicillin after. No NSAIDs or contrast exposure. Has had some urinary retention requiring straight caths here. None in the last 24 hours. Weaned off Levo and stepped down 2 days ago.       Past Medical History:   Diagnosis Date    Abnormal bone density screening     Colon polyps     Degenerative joint disease 02/05/2007    Diverticulosis     Gastroparesis 2018    History of chicken pox     History of depression 02/05/2007    Ischemic colitis 2020    Dr. Drew    Lumbar disc disease        Past Surgical History:   Procedure Laterality Date    APPENDECTOMY      AUGMENTATION OF BREAST      BREAST SURGERY Bilateral 2005    Implants     CHOLECYSTECTOMY  06/2012    COLONOSCOPY  05/12/2020    Dr. Drew, in procedures; repeat in 6 months to check for healing of ischemic colitis    COLONOSCOPY  05/01/2018    Dr. Drew, in procedures    DENTAL SURGERY      DILATION AND CURETTAGE OF UTERUS      HIP SURGERY Right 2007    LUMBAR PUNCTURE N/A 6/5/2023    Procedure: LUMBAR PUNCTURE;  Surgeon: Rangel Perez DO;  Location: [facility] OR 2ND FLR;  Service: Neurosurgery;  Laterality: N/A;    REPAIR, CSF LEAK, CRANIUM, MIDDLE FOSSA APPROACH Left 6/5/2023    Procedure: REPAIR, CSF LEAK, CRANIUM, MIDDLE FOSSA APPROACH;  Surgeon: Gasper Griffith MD;  Location: [facility] OR 2ND FLR;  Service: ENT;  Laterality: Left;    REPAIR, CSF LEAK, CRANIUM, MIDDLE FOSSA APPROACH Left 6/5/2023    Procedure: REPAIR, CSF LEAK, CRANIUM, MIDDLE FOSSA APPROACH;  Surgeon: Rangel Perez DO;  [facility];  Service: Neurosurgery;  Laterality: Left;    TONSILLECTOMY      UPPER GASTROINTESTINAL ENDOSCOPY  04/24/2018    Dr. Drew, in media    WRIST SURGERY Left 01/2014       Review of patient's allergies indicates:  No Known Allergies  Current Facility-Administered Medications   Medication Frequency    aspirin tablet 325 mg Q6H PRN    heparin (porcine) injection 5,000 Units Q8H    hydrOXYzine HCL tablet 25 mg TID PRN    labetalol 20 mg/4 mL (5 mg/mL) IV syring Q15 Min PRN    lactated ringers infusion Continuous    lactulose 20 gram/30 mL solution Soln 15 g Q6H    magnesium oxide tablet 800 mg PRN    magnesium oxide tablet 800 mg PRN    ondansetron injection 4 mg Q6H PRN    oxyCODONE 5 mg/5 mL solution 2.5 mg Q4H PRN    potassium bicarbonate disintegrating tablet 35 mEq PRN    potassium bicarbonate disintegrating tablet 50 mEq PRN    potassium bicarbonate disintegrating tablet 60 mEq PRN    potassium, sodium phosphates 280-160-250 mg packet 2 packet PRN    potassium, sodium phosphates 280-160-250 mg packet 2 packet PRN    potassium, sodium phosphates 280-160-250 mg packet 2 packet PRN     silodosin capsule 4 mg Daily    sodium chloride 0.9% flush 10 mL PRN     Family History       Problem Relation (Age of Onset)    Colon cancer Maternal Grandfather    Diabetes Mother    Throat cancer Father          Tobacco Use    Smoking status: Former     Types: Cigarettes    Smokeless tobacco: Never   Substance and Sexual Activity    Alcohol use: Yes     Alcohol/week: 1.0 standard drink     Types: 1 Glasses of wine per week     Comment: occasional    Drug use: No    Sexual activity: Yes     Birth control/protection: Post-menopausal     Review of Systems   Constitutional:  Positive for fatigue. Negative for chills and fever.   HENT:  Negative for sore throat.    Respiratory:  Negative for cough and shortness of breath.    Cardiovascular:  Negative for chest pain and leg swelling.   Gastrointestinal:  Negative for abdominal pain, constipation, diarrhea, nausea and vomiting.   Genitourinary:  Positive for difficulty urinating. Negative for dysuria.   Musculoskeletal:  Negative for myalgias.   Skin:  Negative for rash.   Neurological:  Negative for dizziness and headaches.   Psychiatric/Behavioral:  Negative for confusion.    Objective:     Vital Signs (Most Recent):  Temp: 98.8 °F (37.1 °C) (06/13/23 1136)  Pulse: 79 (06/13/23 1151)  Resp: (!) 21 (06/13/23 1136)  BP: 137/67 (06/13/23 1136)  SpO2: 95 % (06/13/23 1151) Vital Signs (24h Range):  Temp:  [97.8 °F (36.6 °C)-99.4 °F (37.4 °C)] 98.8 °F (37.1 °C)  Pulse:  [70-81] 79  Resp:  [15-21] 21  SpO2:  [94 %-98 %] 95 %  BP: (137-167)/(67-82) 137/67     Weight: 86.6 kg (190 lb 14.7 oz) (06/09/23 1118)  Body mass index is 27.39 kg/m².  Body surface area is 2.07 meters squared.    I/O last 3 completed shifts:  In: 1163.2 [P.O.:358; I.V.:805.2]  Out: 1276 [Urine:1275; Stool:1]     Physical Exam  Constitutional:       General: She is not in acute distress.     Appearance: Normal appearance.   HENT:      Head: Normocephalic and atraumatic.      Mouth/Throat:      Mouth:  Mucous membranes are moist.      Pharynx: Oropharynx is clear.   Eyes:      Extraocular Movements: Extraocular movements intact.      Pupils: Pupils are equal, round, and reactive to light.   Cardiovascular:      Rate and Rhythm: Normal rate and regular rhythm.      Pulses: Normal pulses.      Heart sounds: Normal heart sounds.   Pulmonary:      Effort: Pulmonary effort is normal. No respiratory distress.      Breath sounds: Normal breath sounds.   Abdominal:      General: Abdomen is flat. Bowel sounds are normal. There is no distension.      Palpations: Abdomen is soft.   Musculoskeletal:         General: No swelling.      Cervical back: Neck supple.   Skin:     General: Skin is warm and dry.      Capillary Refill: Capillary refill takes less than 2 seconds.   Neurological:      General: No focal deficit present.      Mental Status: She is alert and oriented to person, place, and time. Mental status is at baseline.   Psychiatric:         Mood and Affect: Mood normal.         Behavior: Behavior normal.         Thought Content: Thought content normal.         Judgment: Judgment normal.        Significant Labs:  All labs within the past 24 hours have been reviewed.    Significant Imaging:  Reviewed    Assessment/Plan:     Renal/  KAROLINA (acute kidney injury)  -- KAROLINA likely ischemic ATN in s/o shock  -- Cr may still be rising until peaks and begins to decrease  -- Possibly obstructive component as reported urinary retention intermittently here since jamey was d/c  -- Remains hemodynamically stable  -- No exposure to contrast, NSAIDs, or other nephrotoxins noted  -- Euvolemic on exam. Good UOP  -- UA on 6/9 with hematuria, proteinuria, pyuria, bacteria    Recommendations:  -- No indication for RRT  -- F/u RP US  -- F/u urine protein creatinine ratio and UA  -- Spinning urine today  -- Can stop LR. Patient tolerating orals and euvolemic on exam  -- Agree with silodosin  -- Monitor for further urinary retention. May need  medications adjusting or replacement of concepcion to decompress with urology f/u if contributing to rise in Cr        Thank you for your consult. I will follow-up with patient. Please contact us if you have any additional questions.    Mony Murrell,   Nephrology  [facility] ([place]    ATTENDING PHYSICIAN ATTESTATION  I have personally verified the history and examined the patient. I thoroughly reviewed the demographic, clinical, laboratorial and imaging information available in medical records. I agree with the assessment and recommendations provided by the subspecialty resident who was under my supervision.

## 2023-06-14 PROBLEM — Q16.5 TEGMEN DEFECT OF BASE OF SKULL: Chronic | Status: ACTIVE | Noted: 2023-04-12

## 2023-06-14 PROBLEM — N17.0 ATN (ACUTE TUBULAR NECROSIS): Status: ACTIVE | Noted: 2023-06-14

## 2023-06-14 PROBLEM — G43.109 MIGRAINE WITH AURA AND WITHOUT STATUS MIGRAINOSUS, NOT INTRACTABLE: Chronic | Status: ACTIVE | Noted: 2018-03-27

## 2023-06-14 PROBLEM — M51.9 LUMBAR DISC DISEASE: Chronic | Status: ACTIVE | Noted: 2019-04-11

## 2023-06-14 LAB
ALBUMIN SERPL BCP-MCNC: 2.5 G/DL (ref 3.5–5.2)
ALBUMIN SERPL BCP-MCNC: 2.6 G/DL (ref 3.5–5.2)
ALP SERPL-CCNC: 129 U/L (ref 55–135)
ALP SERPL-CCNC: 135 U/L (ref 55–135)
ALP SERPL-CCNC: 135 U/L (ref 55–135)
ALT SERPL W/O P-5'-P-CCNC: 666 U/L (ref 10–44)
ALT SERPL W/O P-5'-P-CCNC: 666 U/L (ref 10–44)
ALT SERPL W/O P-5'-P-CCNC: 737 U/L (ref 10–44)
ALT SERPL W/O P-5'-P-CCNC: 766 U/L (ref 10–44)
ALT SERPL W/O P-5'-P-CCNC: 766 U/L (ref 10–44)
AMMONIA PLAS-SCNC: 55 UMOL/L (ref 10–50)
ANION GAP SERPL CALC-SCNC: 11 MMOL/L (ref 8–16)
ANION GAP SERPL CALC-SCNC: 12 MMOL/L (ref 8–16)
ANION GAP SERPL CALC-SCNC: 14 MMOL/L (ref 8–16)
AST SERPL-CCNC: 106 U/L (ref 10–40)
AST SERPL-CCNC: 113 U/L (ref 10–40)
AST SERPL-CCNC: 113 U/L (ref 10–40)
AST SERPL-CCNC: 90 U/L (ref 10–40)
AST SERPL-CCNC: 90 U/L (ref 10–40)
BASOPHILS # BLD AUTO: 0.05 K/UL (ref 0–0.2)
BASOPHILS NFR BLD: 0.5 % (ref 0–1.9)
BILIRUB DIRECT SERPL-MCNC: 0.3 MG/DL (ref 0.1–0.3)
BILIRUB DIRECT SERPL-MCNC: 0.3 MG/DL (ref 0.1–0.3)
BILIRUB SERPL-MCNC: 0.6 MG/DL (ref 0.1–1)
BILIRUB SERPL-MCNC: 0.7 MG/DL (ref 0.1–1)
BILIRUB SERPL-MCNC: 0.7 MG/DL (ref 0.1–1)
BUN SERPL-MCNC: 32 MG/DL (ref 8–23)
BUN SERPL-MCNC: 33 MG/DL (ref 8–23)
BUN SERPL-MCNC: 34 MG/DL (ref 8–23)
CALCIUM SERPL-MCNC: 8.5 MG/DL (ref 8.7–10.5)
CALCIUM SERPL-MCNC: 8.6 MG/DL (ref 8.7–10.5)
CALCIUM SERPL-MCNC: 8.6 MG/DL (ref 8.7–10.5)
CHLORIDE SERPL-SCNC: 105 MMOL/L (ref 95–110)
CHLORIDE SERPL-SCNC: 108 MMOL/L (ref 95–110)
CHLORIDE SERPL-SCNC: 109 MMOL/L (ref 95–110)
CO2 SERPL-SCNC: 19 MMOL/L (ref 23–29)
CO2 SERPL-SCNC: 21 MMOL/L (ref 23–29)
CO2 SERPL-SCNC: 21 MMOL/L (ref 23–29)
CREAT SERPL-MCNC: 4.4 MG/DL (ref 0.5–1.4)
CREAT SERPL-MCNC: 4.4 MG/DL (ref 0.5–1.4)
CREAT SERPL-MCNC: 4.5 MG/DL (ref 0.5–1.4)
DIFFERENTIAL METHOD: ABNORMAL
EOSINOPHIL # BLD AUTO: 0.4 K/UL (ref 0–0.5)
EOSINOPHIL NFR BLD: 4.5 % (ref 0–8)
ERYTHROCYTE [DISTWIDTH] IN BLOOD BY AUTOMATED COUNT: 14.4 % (ref 11.5–14.5)
EST. GFR  (NO RACE VARIABLE): 10 ML/MIN/1.73 M^2
EST. GFR  (NO RACE VARIABLE): 10.3 ML/MIN/1.73 M^2
EST. GFR  (NO RACE VARIABLE): 10.3 ML/MIN/1.73 M^2
GLUCOSE SERPL-MCNC: 105 MG/DL (ref 70–110)
GLUCOSE SERPL-MCNC: 114 MG/DL (ref 70–110)
GLUCOSE SERPL-MCNC: 98 MG/DL (ref 70–110)
HCT VFR BLD AUTO: 32.9 % (ref 37–48.5)
HGB BLD-MCNC: 10.5 G/DL (ref 12–16)
IMM GRANULOCYTES # BLD AUTO: 0.05 K/UL (ref 0–0.04)
IMM GRANULOCYTES NFR BLD AUTO: 0.5 % (ref 0–0.5)
INR PPP: 1 (ref 0.8–1.2)
LYMPHOCYTES # BLD AUTO: 1 K/UL (ref 1–4.8)
LYMPHOCYTES NFR BLD: 10.6 % (ref 18–48)
MAGNESIUM SERPL-MCNC: 1.9 MG/DL (ref 1.6–2.6)
MCH RBC QN AUTO: 27.3 PG (ref 27–31)
MCHC RBC AUTO-ENTMCNC: 31.9 G/DL (ref 32–36)
MCV RBC AUTO: 86 FL (ref 82–98)
MONOCYTES # BLD AUTO: 0.7 K/UL (ref 0.3–1)
MONOCYTES NFR BLD: 7.2 % (ref 4–15)
NEUTROPHILS # BLD AUTO: 7.1 K/UL (ref 1.8–7.7)
NEUTROPHILS NFR BLD: 76.7 % (ref 38–73)
NRBC BLD-RTO: 0 /100 WBC
PHOSPHATE SERPL-MCNC: 3.9 MG/DL (ref 2.7–4.5)
PLATELET # BLD AUTO: 225 K/UL (ref 150–450)
PMV BLD AUTO: 10.2 FL (ref 9.2–12.9)
POTASSIUM SERPL-SCNC: 3.3 MMOL/L (ref 3.5–5.1)
POTASSIUM SERPL-SCNC: 3.3 MMOL/L (ref 3.5–5.1)
POTASSIUM SERPL-SCNC: 3.4 MMOL/L (ref 3.5–5.1)
PROT SERPL-MCNC: 5.4 G/DL (ref 6–8.4)
PROT SERPL-MCNC: 5.6 G/DL (ref 6–8.4)
PROTHROMBIN TIME: 11 SEC (ref 9–12.5)
RBC # BLD AUTO: 3.84 M/UL (ref 4–5.4)
SODIUM SERPL-SCNC: 140 MMOL/L (ref 136–145)
WBC # BLD AUTO: 9.3 K/UL (ref 3.9–12.7)

## 2023-06-14 PROCEDURE — 94761 N-INVAS EAR/PLS OXIMETRY MLT: CPT

## 2023-06-14 PROCEDURE — 99233 PR SUBSEQUENT HOSPITAL CARE,LEVL III: ICD-10-PCS | Mod: ,,, | Performed by: INTERNAL MEDICINE

## 2023-06-14 PROCEDURE — 80053 COMPREHEN METABOLIC PANEL: CPT | Mod: 91 | Performed by: NURSE PRACTITIONER

## 2023-06-14 PROCEDURE — 85610 PROTHROMBIN TIME: CPT | Performed by: STUDENT IN AN ORGANIZED HEALTH CARE EDUCATION/TRAINING PROGRAM

## 2023-06-14 PROCEDURE — 99232 SBSQ HOSP IP/OBS MODERATE 35: CPT | Mod: ,,, | Performed by: INTERNAL MEDICINE

## 2023-06-14 PROCEDURE — 25000003 PHARM REV CODE 250: Performed by: NURSE PRACTITIONER

## 2023-06-14 PROCEDURE — 84100 ASSAY OF PHOSPHORUS: CPT | Performed by: EMERGENCY MEDICINE

## 2023-06-14 PROCEDURE — 85025 COMPLETE CBC W/AUTO DIFF WBC: CPT | Performed by: EMERGENCY MEDICINE

## 2023-06-14 PROCEDURE — 25000003 PHARM REV CODE 250: Performed by: HOSPITALIST

## 2023-06-14 PROCEDURE — 83735 ASSAY OF MAGNESIUM: CPT | Performed by: EMERGENCY MEDICINE

## 2023-06-14 PROCEDURE — 82140 ASSAY OF AMMONIA: CPT | Performed by: NURSE PRACTITIONER

## 2023-06-14 PROCEDURE — 36415 COLL VENOUS BLD VENIPUNCTURE: CPT | Performed by: NURSE PRACTITIONER

## 2023-06-14 PROCEDURE — 80053 COMPREHEN METABOLIC PANEL: CPT | Performed by: EMERGENCY MEDICINE

## 2023-06-14 PROCEDURE — 20600001 HC STEP DOWN PRIVATE ROOM

## 2023-06-14 PROCEDURE — 63600175 PHARM REV CODE 636 W HCPCS: Performed by: NURSE PRACTITIONER

## 2023-06-14 PROCEDURE — 36415 COLL VENOUS BLD VENIPUNCTURE: CPT | Performed by: STUDENT IN AN ORGANIZED HEALTH CARE EDUCATION/TRAINING PROGRAM

## 2023-06-14 PROCEDURE — 99233 SBSQ HOSP IP/OBS HIGH 50: CPT | Mod: ,,, | Performed by: INTERNAL MEDICINE

## 2023-06-14 PROCEDURE — 99232 PR SUBSEQUENT HOSPITAL CARE,LEVL II: ICD-10-PCS | Mod: ,,, | Performed by: INTERNAL MEDICINE

## 2023-06-14 RX ADMIN — LACTULOSE 15 G: 20 SOLUTION ORAL at 05:06

## 2023-06-14 RX ADMIN — LACTULOSE 15 G: 20 SOLUTION ORAL at 12:06

## 2023-06-14 RX ADMIN — HEPARIN SODIUM 5000 UNITS: 5000 INJECTION INTRAVENOUS; SUBCUTANEOUS at 05:06

## 2023-06-14 RX ADMIN — HYDROXYZINE HYDROCHLORIDE 25 MG: 25 TABLET, FILM COATED ORAL at 01:06

## 2023-06-14 RX ADMIN — ONDANSETRON 4 MG: 2 INJECTION INTRAMUSCULAR; INTRAVENOUS at 01:06

## 2023-06-14 RX ADMIN — SILODOSIN 4 MG: 4 CAPSULE ORAL at 09:06

## 2023-06-14 RX ADMIN — HEPARIN SODIUM 5000 UNITS: 5000 INJECTION INTRAVENOUS; SUBCUTANEOUS at 10:06

## 2023-06-14 RX ADMIN — HYDROXYZINE HYDROCHLORIDE 25 MG: 25 TABLET, FILM COATED ORAL at 08:06

## 2023-06-14 NOTE — ASSESSMENT & PLAN NOTE
Presented to OSH with new KAROLINA, Crt 1.4, worsening to > 1.9 GFR 20's after transfer  Follow I/O, Follow UO  IVF started, bolus given on arrival and it appears none was given prior to transfer  Given worsening KAROLINA, Liver function and Cardiac enzymes, believe global hypoxic event  Nephrology consulted, seem to have peaked on 6/13

## 2023-06-14 NOTE — PLAN OF CARE
06/14/23 1256   Post-Acute Status   Post-Acute Authorization Placement   Home Health Status Set-up Complete/Auth obtained  ([facility] Phone: [phone])   Coverage [employer] MANAGED MEDICARE - [employer] MED[employer] HMO   Discharge Delays None known at this time   Discharge Plan   Discharge Plan A Home Health  ([facility] Phone: [phone])     12:45 pm  CM called and spoke to Sonya with [facility] in [place] and patient has been accepted.  Evelio VILLAR in [facility] is no longer in services.     12:55 pm   CM updated the patient and spouse of FARZANEH:  6/19/23. No change in discharge plans and no HME./DME needs.    Follow up needed: PCP and nephrology    Saumya Borjas RN  Case Management  [facility]  [phone]

## 2023-06-14 NOTE — SUBJECTIVE & OBJECTIVE
Interval History: Cr 4.5, unchanged. Possibly plateau phase of ischemic ATN. UOP 1.2 L. Urine protein undetectable. RP US without hydronephrosis, simple left renal cyst. No changes today. F/u RFP in AM.    Review of Systems   Constitutional:  Positive for fatigue. Negative for chills and fever.   HENT:  Negative for sore throat.    Respiratory:  Negative for cough and shortness of breath.    Cardiovascular:  Negative for chest pain and leg swelling.   Gastrointestinal:  Negative for abdominal pain, constipation, diarrhea, nausea and vomiting.   Genitourinary:  Positive for difficulty urinating. Negative for dysuria.   Musculoskeletal:  Negative for myalgias.   Skin:  Negative for rash.   Neurological:  Negative for dizziness and headaches.   Psychiatric/Behavioral:  Negative for confusion.    Objective:     Vital Signs (Most Recent):  Temp: 99.1 °F (37.3 °C) (06/14/23 1145)  Pulse: 82 (06/14/23 1145)  Resp: 20 (06/14/23 1145)  BP: (!) 154/71 (06/14/23 1145)  SpO2: 96 % (06/14/23 1145) Vital Signs (24h Range):  Temp:  [98.5 °F (36.9 °C)-99.1 °F (37.3 °C)] 99.1 °F (37.3 °C)  Pulse:  [68-85] 82  Resp:  [17-22] 20  SpO2:  [96 %-99 %] 96 %  BP: (145-154)/(70-81) 154/71     Weight: 86.6 kg (190 lb 14.7 oz) (06/09/23 1118)  Body mass index is 27.39 kg/m².  Body surface area is 2.07 meters squared.    I/O last 3 completed shifts:  In: 360 [P.O.:360]  Out: 2076 [Urine:2075; Stool:1]     Physical Exam  Constitutional:       General: She is not in acute distress.     Appearance: Normal appearance.   HENT:      Head: Normocephalic and atraumatic.      Mouth/Throat:      Mouth: Mucous membranes are moist.      Pharynx: Oropharynx is clear.   Eyes:      Extraocular Movements: Extraocular movements intact.      Pupils: Pupils are equal, round, and reactive to light.   Cardiovascular:      Rate and Rhythm: Normal rate and regular rhythm.      Pulses: Normal pulses.      Heart sounds: Normal heart sounds.   Pulmonary:      Effort:  Pulmonary effort is normal. No respiratory distress.      Breath sounds: Normal breath sounds.   Abdominal:      General: Abdomen is flat. Bowel sounds are normal. There is no distension.      Palpations: Abdomen is soft.   Musculoskeletal:         General: No swelling.      Cervical back: Neck supple.   Skin:     General: Skin is warm and dry.      Capillary Refill: Capillary refill takes less than 2 seconds.   Neurological:      General: No focal deficit present.      Mental Status: She is alert and oriented to person, place, and time. Mental status is at baseline.   Psychiatric:         Mood and Affect: Mood normal.         Behavior: Behavior normal.         Thought Content: Thought content normal.         Judgment: Judgment normal.        Significant Labs:  All labs within the past 24 hours have been reviewed.    Significant Imaging:  Reviewed

## 2023-06-14 NOTE — PT/OT/SLP PROGRESS
Occupational Therapy      Patient Name:  Tona Zafar   MRN:  [id number]    Patient not seen today secondary to Other (Comment) (LLE DVT r/o). Pt having 10/10 maria del carmen in LLE extremity and pending ultrasound. Will follow-up at a later time/date as allowed.    Robbi DAMONR    6/14/2023

## 2023-06-14 NOTE — ASSESSMENT & PLAN NOTE
-- KAROLINA likely ischemic ATN in s/o shock  -- Remains hemodynamically stable  -- No exposure to contrast, NSAIDs, or other nephrotoxins noted  -- Euvolemic on exam. Good UOP  -- UA on 6/9 with hematuria, proteinuria, pyuria, bacteria  -- RP US without hydronephrosis, simple left renal cyst. Unlikely obstruction from urinary retention contributing  -- Urine protein undetectable.  -- Good UOP  -- Cr 4.5, unchanged.     Recommendations:  -- No changes today.  -- F/u RFP in AM for improvement in Cr. Suspect possibly entering plateau phase of ischemic ATN and will downtrend soon  -- No indication for RRT  -- Agree with silodosin

## 2023-06-14 NOTE — PLAN OF CARE
Problem: Adult Inpatient Plan of Care  Goal: Plan of Care Review  Outcome: Ongoing, Progressing     Problem: Skin Injury Risk Increased  Goal: Skin Health and Integrity  Outcome: Ongoing, Progressing     Problem: Fall Injury Risk  Goal: Absence of Fall and Fall-Related Injury  Outcome: Ongoing, Progressing     POC reviewed with patient and  who remained at bedside this shift.  A/O x4.  Respirations unlabored.  O2 continues at 2L/NC.  Skin w/d.  Continent of b/b.  Up to bedside commode with x1 assist.  Incision to left head continues to be STARR with sutures intact.  No bleeding/drainage noted.  Pt c/o H/A x1 this shift with PRN Aspirin given.  Full relief noted.  Pt also c/o nausea x1 thus far in shift.  PRN Zofran given with full relief noted.  Tolerated meds whole with water without difficulty.  VSS.  See flowsheet for full assessment.  Able to verbalize wants/needs.  No s/s of distress.  Fall/safety precautions maintained.

## 2023-06-14 NOTE — PROGRESS NOTES
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  Was discharged 6/7 with concepcion catheter due to urinary retention with plans for urological follow up as well as typical post-operative pain medication and empiric seizure medications.  Shortly after discharge she developed progressively worsening lethargy and poor mentation, complaining of pain,  provided pain medications.  Morning of 6/8 was poorly arousable, 911 called was hypoxemic to 50's% o2 on scene for EMS and hypotensive.  At [facility] ED was in initial shock state requiring norepinephrine titration, poor mentation responsive to narcan push x2 with slow return to lethargy leading to initiation of narcan infusion.  Labs were concerning for KAROLINA with Cr > 1.5, acute liver injury with AST/ALT >3500/2000 (normal previous day), troponin elevation to >1.8, and respiratory failure requiring face mask.  [place] [facility] [place].      Overview/Hospital Course:  Presented as transfer from [facility] after arrived encephalopathic there, initialy with unknown cause. She arrived hypotensive and hypoxic, requirng levo gtt. She responded to narcan and was started on a Narcan gtt. After transfer accepted to [facility], she was found to be in acute liver failure and subsequently progressed to renal failure as well  -CTH with initally post-op changes, discussed with NSGY. Repeat CTH ordered.  -Elevated ammonia/liver enzymes,  Ms decline though rohit due to hepatic encephalopathy.  -UTI, rocephin  "started  6/13 MS has returned to baseline. LFT's are improving. Renal function has not plateaued yet, will consult nephrology.      Interval History: Cr is down to 4.4, ATN has probably peaked. LFT"s continue to decline. Will try to get sutures removed today, can probably go home in am    Review of Systems  Objective:     Vital Signs (Most Recent):  Temp: 98.6 °F (37 °C) (06/14/23 0730)  Pulse: 85 (06/14/23 0730)  Resp: 18 (06/14/23 0730)  BP: (!) 154/81 (06/14/23 0730)  SpO2: 99 % (06/14/23 0730) Vital Signs (24h Range):  Temp:  [98.5 °F (36.9 °C)-98.8 °F (37.1 °C)] 98.6 °F (37 °C)  Pulse:  [68-85] 85  Resp:  [17-22] 18  SpO2:  [95 %-99 %] 99 %  BP: (137-154)/(67-81) 154/81     Weight: 86.6 kg (190 lb 14.7 oz)  Body mass index is 27.39 kg/m².    Intake/Output Summary (Last 24 hours) at 6/14/2023 0936  Last data filed at 6/14/2023 0800  Gross per 24 hour   Intake 582 ml   Output 900 ml   Net -318 ml      Physical Exam  Vitals and nursing note reviewed.   Constitutional:       Appearance: Normal appearance.   HENT:      Head: Normocephalic.      Nose: Nose normal.      Mouth/Throat:      Mouth: Mucous membranes are moist.      Pharynx: Oropharynx is clear.   Eyes:      Pupils: Pupils are equal, round, and reactive to light.   Cardiovascular:      Rate and Rhythm: Normal rate and regular rhythm.      Pulses: Normal pulses.      Heart sounds: Normal heart sounds.   Pulmonary:      Effort: Pulmonary effort is normal.      Breath sounds: Normal breath sounds.   Abdominal:      General: Bowel sounds are normal.      Palpations: Abdomen is soft.   Musculoskeletal:         General: Swelling present. Normal range of motion.   Skin:     General: Skin is warm and dry.      Capillary Refill: Capillary refill takes 2 to 3 seconds.   Neurological:      Mental Status: She is alert.      Comments: GCS 15  AAO X2-3  PERRL  MUNIZ to command and spontaneously  Sensation Intact X4       MELD-Na: 20 at 6/14/2023  8:23 AM  MELD: 20 at " 6/14/2023  8:23 AM  Calculated from:  Serum Creatinine: 4.4 mg/dL (Using max of 4 mg/dL) at 6/14/2023  8:23 AM  Serum Sodium: 140 mmol/L (Using max of 137 mmol/L) at 6/14/2023  8:23 AM  Total Bilirubin: 0.7 mg/dL (Using min of 1 mg/dL) at 6/14/2023  8:23 AM  INR(ratio): 1.0 at 6/14/2023  5:08 AM      Significant Labs:  CBC:  Recent Labs   Lab 06/13/23  0354 06/14/23  0508   WBC 8.46 9.30   HGB 10.6* 10.5*   HCT 34.3* 32.9*    225     CMP:  Recent Labs   Lab 06/13/23  1642 06/14/23  0508 06/14/23  0823    140 140   K 3.4* 3.4* 3.3*    109 108   CO2 22* 19* 21*    98 105   BUN 35* 34* 33*   CREATININE 4.5* 4.5* 4.4*   CALCIUM 8.6* 8.6* 8.6*   PROT 5.3*  5.3* 5.4* 5.6*  5.6*   ALBUMIN 2.4*  2.4* 2.5* 2.6*  2.6*   BILITOT 0.5  0.5 0.6 0.7  0.7   ALKPHOS 128  128 129 135  135   *  108* 106* 113*  113*   *  828* 737* 766*  766*   ANIONGAP 12 12 11     PTINR:  Recent Labs   Lab 06/14/23  0508   INR 1.0       Significant Procedures:   Dobutamine Stress Test with Color Flow: No results found for this or any previous visit.    None      Assessment/Plan:      * Acute encephalopathy  Presented as transfer from [facility] after arrived encephalopathic there, initialy with unknown cause. She arrived hypotensive and hypoxic, nstemi, shock liver, karolina, and acute cystits from enterococcus uti                 Overdose  inittial wakefulness improved with narcan at OSH, gtt was started and continued to tranfers to [facility].   Stopped shortly after arrival. Follow exam closely       KAROLINA (acute kidney injury)  Presented to OSH with new KAROLINA, Crt 1.4, worsening to > 1.9 GFR 20's after transfer  Follow I/O, Follow UO  IVF started, bolus given on arrival and it appears none was given prior to transfer  Given worsening KAROLINA, Liver function and Cardiac enzymes, believe global hypoxic event  Nephrology consulted, seem to have peaked on 6/13      Acute liver failure  Presented at OSH with [place] increase in  AST/ALT over pst 24hours,   AST/ALT normal on 6/7 and acutely increased to ASt 3569, ALT 2074 in 24 hrs  AST/ALT continuing to trend down  Bili is WNL  Ammonia trending downHepatology consulted  Liver US with doppler pending  Coags currently WNL  AST/ALT trending down  -thoughts are global hypoxic event      Tegmen defect of base of skull  S/p tegmentum defect repair on 6/6 wit NSGY  Step down and discharge on 6/7 by NSGY     NSGY consulted after transfer to [facility]  Incision left temple is C/D/I, and no fresh draiange noted to the left ear, some dried flecks of blood.        VTE Risk Mitigation (From admission, onward)         Ordered     heparin (porcine) injection 5,000 Units  Every 8 hours         06/11/23 1026     IP VTE HIGH RISK PATIENT  Once         06/08/23 2225     Place sequential compression device  Until discontinued         06/08/23 2225                Discharge Planning   FARZANEH: 6/19/2023     Code Status: Full Code   Is the patient medically ready for discharge?: No    Reason for patient still in hospital (select all that apply): Patient unstable  Discharge Plan A: Home Health   Discharge Delays: None known at this time              Salvatore Horn MD  Department of Hospital Medicine   [facility] - Intensive Care ([facility]

## 2023-06-14 NOTE — PROGRESS NOTES
[facility] - Intensive Care ([place] [address])  Nephrology  Progress Note    Patient Name: Tona Zafar  MRN: [id number]  Admission Date: 6/8/2023  Hospital Length of Stay: 6 days  Attending Provider: Salvatore Horn MD   Primary Care Physician: Hubert Barrera MD  Principal Problem:Acute encephalopathy    Subjective:     HPI: Tona Zafar is a 68 y.o. 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 Originally discharge 6/7/23 following surgery with concepcion for urinary retention, tamsulosin, oxycodone for post-op pain, gabapentin, and Keppra for sz ppx. Developed worsening lethargy and AMS and couldn't be around the following day. Went to [facility] ED and found to be in septic shock 2/2 Enterococcus UTI requiring Levo. Mentation improved with Narcan infusion. Associated NSTEMI, shock liver, hypoxemic respiratory failure requiring face mask, and KAROLINA with Cr 1.4 from baseline 0.6-0.8. Has been increasing each day > 3.2 > 3.7 > 4.2 > 4.3 > 4.5 today. Receiving LR at 75 ml/hr since 6/10/23. Received abx including vanc and CTX the first 2 days and amoxicillin after. No NSAIDs or contrast exposure. Has had some urinary retention requiring straight caths here. None in the last 24 hours. Weaned off Levo and stepped down 2 days ago.       Interval History: Cr 4.5, unchanged. Possibly plateau phase of ischemic ATN. UOP 1.2 L. Urine protein undetectable. RP US without hydronephrosis, simple left renal cyst. No changes today. F/u RFP in AM.    Review of Systems   Constitutional:  Positive for fatigue. Negative for chills and fever.   HENT:  Negative for sore throat.    Respiratory:  Negative for cough and shortness of breath.    Cardiovascular:  Negative for chest pain and leg swelling.   Gastrointestinal:  Negative for abdominal pain, constipation, diarrhea, nausea and vomiting.   Genitourinary:  Positive  for difficulty urinating. Negative for dysuria.   Musculoskeletal:  Negative for myalgias.   Skin:  Negative for rash.   Neurological:  Negative for dizziness and headaches.   Psychiatric/Behavioral:  Negative for confusion.    Objective:     Vital Signs (Most Recent):  Temp: 99.1 °F (37.3 °C) (06/14/23 1145)  Pulse: 82 (06/14/23 1145)  Resp: 20 (06/14/23 1145)  BP: (!) 154/71 (06/14/23 1145)  SpO2: 96 % (06/14/23 1145) Vital Signs (24h Range):  Temp:  [98.5 °F (36.9 °C)-99.1 °F (37.3 °C)] 99.1 °F (37.3 °C)  Pulse:  [68-85] 82  Resp:  [17-22] 20  SpO2:  [96 %-99 %] 96 %  BP: (145-154)/(70-81) 154/71     Weight: 86.6 kg (190 lb 14.7 oz) (06/09/23 1118)  Body mass index is 27.39 kg/m².  Body surface area is 2.07 meters squared.    I/O last 3 completed shifts:  In: 360 [P.O.:360]  Out: 2076 [Urine:2075; Stool:1]    Physical Exam  Constitutional:       General: She is not in acute distress.     Appearance: Normal appearance.   HENT:      Head: Normocephalic and atraumatic.      Mouth/Throat:      Mouth: Mucous membranes are moist.      Pharynx: Oropharynx is clear.   Eyes:      Extraocular Movements: Extraocular movements intact.      Pupils: Pupils are equal, round, and reactive to light.   Cardiovascular:      Rate and Rhythm: Normal rate and regular rhythm.      Pulses: Normal pulses.      Heart sounds: Normal heart sounds.   Pulmonary:      Effort: Pulmonary effort is normal. No respiratory distress.      Breath sounds: Normal breath sounds.   Abdominal:      General: Abdomen is flat. Bowel sounds are normal. There is no distension.      Palpations: Abdomen is soft.   Musculoskeletal:         General: No swelling.      Cervical back: Neck supple.   Skin:     General: Skin is warm and dry.      Capillary Refill: Capillary refill takes less than 2 seconds.   Neurological:      General: No focal deficit present.      Mental Status: She is alert and oriented to person, place, and time. Mental status is at baseline.    Psychiatric:         Mood and Affect: Mood normal.         Behavior: Behavior normal.         Thought Content: Thought content normal.         Judgment: Judgment normal.        Significant Labs:  All labs within the past 24 hours have been reviewed.    Significant Imaging:  Reviewed    Assessment/Plan:     Renal/  KAROLINA (acute kidney injury)  -- KAROLINA likely ischemic ATN in s/o shock  -- Remains hemodynamically stable  -- No exposure to contrast, NSAIDs, or other nephrotoxins noted  -- Euvolemic on exam. Good UOP  -- UA on 6/9 with hematuria, proteinuria, pyuria, bacteria  -- RP US without hydronephrosis, simple left renal cyst. Unlikely obstruction from urinary retention contributing  -- Urine protein undetectable.  -- Good UOP  -- Cr 4.5, unchanged.   -- Urine spun today, with evidence of ATN    Recommendations:  -- No changes today.  -- F/u RFP in AM for improvement in Cr. Suspect possibly entering plateau phase of ischemic ATN and will downtrend soon  -- No indication for RRT  -- Agree with silodosin          Thank you for your consult. I will follow-up with patient. Please contact us if you have any additional questions.    Mony Murrell, DO  Nephrology  [facility] - Intensive Care ([facility]    ATTENDING PHYSICIAN ATTESTATION  I have personally verified the history and examined the patient. I thoroughly reviewed the demographic, clinical, laboratorial and imaging information available in medical records. I agree with the assessment and recommendations provided by the subspecialty resident who was under my supervision.

## 2023-06-14 NOTE — PT/OT/SLP PROGRESS
Physical Therapy      Patient Name:  Tona Zafar   MRN:  [id number]    Patient not seen today secondary to Other (Comment) (Pt awaiting US for DVT R/O in L LE). Will follow-up on next scheduled visit.

## 2023-06-14 NOTE — ASSESSMENT & PLAN NOTE
S/p tegmentum defect repair on 6/6 wit NSGY  Step down and discharge on 6/7 by NSGY     [place]GY [place] [facility]  Incision left temple is C/D/I, and no fresh draiange noted to the left ear, some dried flecks of blood.

## 2023-06-14 NOTE — PHYSICIAN QUERY
PT Name: Tona Zafar  MR #: [id number]    DOCUMENTATION CLARIFICATION     CDS/: Maday Cole RN          Contact information:  [email or web address]  This form is a permanent document in the medical record.     Query Date: June 14, 2023    By submitting this query, we are merely seeking further clarification of documentation. Please utilize your independent clinical judgment when addressing the question(s) below.    The Medical Record contains the following:   Indicators   Supporting Clinical Findings Location in Medical Record   x AMS, Confusion,  LOC, etc.  * Acute encephalopathy  Presented as transfer from [facility] after arrived encephalopathic there, initialy with unknown cause. She arrived hypotensive and hypoxic, nstemi, shock liver, karolina, and acute cystits from enterococcus uti    Overdose  inittial wakefulness improved with narcan at OSH, gtt was started and continued to tranfers to [facility].   Stopped shortly after arrival. Follow exam closely    KAROLINA (acute kidney injury)  Presented to OSH with new KAROLINA, Crt 1.4, worsening to > 1.9 GFR 20's after transfer  Follow I/O, Follow UO  IVF started, bolus given on arrival and it appears none was given prior to transfer  Given worsening KAROLINA, Liver function and Cardiac enzymes, believe global hypoxic event   Acute liver failure  Presented at OSH with jamin increase in AST/ALT over pst 24hours,   AST/ALT normal on 6/7 and acutely increased to ASt 3569, ALT 2074 in 24 hrs  AST/ALT continuing to trend down  Bili is WNL  Ammonia trending downHepatology consulted  Liver US with doppler pending  Coags currently WNL  AST/ALT trending down  -thoughts are global hypoxic event   Progress Note 6/13 HM ([place])   x Acute/Chronic Illness  hx of arthritis, diverticulosis, ischemic colitis, gastroparesis, tegmen defect s/p L temporal craniotomy and defect repair with cement and intradural dura matrix inlay at the site of encephalocele on June 5, who presented to [facility]  ED for AMS. Patient was discharge from the hospital 6/7 s/p craniotomy and tegmen defect repair. She was discharged with prescription for gabapentin, keppra, percocet, she also has indwelling Cedillo catheter and tamsulosin for urinary retention post-op. She subsequently presented to [facility] Emergency Department on June 8 with altered mental status.  Patient's  noted that the patient was weak and could not stand on the way home from the hospital.  At 6:00 p.m. he gave her gabapentin and oxycodone.  He gave a subsequent dose at 2:00 a.m..  At 6:00 a.m. he found her unresponsive and sweating.  EMS was contacted.  O2 sats initially were in the low 50% range.  She received 4 mg of Narcan resulting in agitation.  On exam she was somnolent and would arouse to physical stimulus.  She would follow basic commands such as squeezing fingers when she was awake.  GCS was noted to be around 10.  She was initially hypotensive requiring Levophed, . In the emergency department she received LR and Narcan.  After the Narcan, she was disoriented but able to say her name and recognize family.  During her stay she can became somnolent.  She again responded to Narcan.  OSH ED is placed her on a Narcan infusion. She was found to have acute liver failure, KAROLINA, elevatedt troponin and UTI. On broad spectrum abx.     H/P 6/8 ([place]) NCC   x Radiology Findings FINDINGS:  Postoperative changes in the left temporal region with craniotomy flap again noted.  There is a low density focus along the inferior temporal lobes similar to the prior exam.  No acute intracranial hemorrhage.  No new focal lesion or mass effect.  Ventricles, cisterns and sulci appear stable.  Posterior fossa structures and pituitary gland appear stable and unremarkable as imaged.  Complete opacification of the left mastoid air cells and middle ear opacification again noted.     Scalp postoperative changes with swelling and air in the soft  tissues over the left temporal bone again noted.     Impression:     Left temporal lobe low density raise question of infarct versus postoperative changes in the surgical bed.     No acute hemorrhage or detrimental change otherwise.     Postoperative changes in the left temporal fossa.     Fluid in the middle ear and mastoids on the left. CT Head 6/8    Electrolyte Imbalance      Medication      Treatment              Other       The noted clinical guidelines are only system guidelines and do not replace the providers clinical judgment.    The National [place] of Neurologic Disorders and Stroke (NINDS) of the NIH describes encephalopathy as any diffuse disease of the brain that alters brain function or structure.    Provider, please specify the type of Encephalopathy associated with above clinical findings. (Check all that apply)    [  x ] Metabolic Encephalopathy - Due to electrolyte imbalance, metabolic derangements, or infectious processes, includes Septic Encephalopathy, Uremic Encephalopathy   [   ] Toxic Encephalopathy - Due to drugs, chemicals, or other toxic substances   [   ] Hepatic Encephalopathy - Due to liver disease/failure   [   ] Encephalopathy, unspecified      [   ] Other Encephalopathy (please specify): ____________________         Please document in your progress notes daily for the duration of treatment until resolved, and include in your discharge summary.    References:  NICOLE Clements RN, CCDS. (2018, June 9). Notes from the Instructor: Encephalopathy tips. Retrieved October 22, 2020, from [email or web address]/articles/[email or web address]-[email or web address]-tips    ICD-9-CM Coding Clinic First Quarter 2013, Effective with discharges: October 21, 2013 [facility] [employer] § Seizure with encephalopathy due to postictal state (2013).    ICD-10-CM/[employer] Integrated Codebook (Version V.20.8.10.0) [Computer software]. (2020). Retrieved October 21, 2020.    National [place] of Neurological  Disorders and Stroke. (2019, March 27). Retrieved October 22, 2020, from https://www.ninds.nih.gov/Disorders/All-Disorders/Cfjrqxucgeolkd-Jszqkxfzupp-Axiq    Form No. 75331

## 2023-06-14 NOTE — SUBJECTIVE & OBJECTIVE
"Interval History: Cr is down to 4.4, ATN has probably peaked. LFT"s continue to decline. Will try to get sutures removed today, can probably go home in am    Review of Systems  Objective:     Vital Signs (Most Recent):  Temp: 98.6 °F (37 °C) (06/14/23 0730)  Pulse: 85 (06/14/23 0730)  Resp: 18 (06/14/23 0730)  BP: (!) 154/81 (06/14/23 0730)  SpO2: 99 % (06/14/23 0730) Vital Signs (24h Range):  Temp:  [98.5 °F (36.9 °C)-98.8 °F (37.1 °C)] 98.6 °F (37 °C)  Pulse:  [68-85] 85  Resp:  [17-22] 18  SpO2:  [95 %-99 %] 99 %  BP: (137-154)/(67-81) 154/81     Weight: 86.6 kg (190 lb 14.7 oz)  Body mass index is 27.39 kg/m².    Intake/Output Summary (Last 24 hours) at 6/14/2023 0936  Last data filed at 6/14/2023 0800  Gross per 24 hour   Intake 582 ml   Output 900 ml   Net -318 ml      Physical Exam  Vitals and nursing note reviewed.   Constitutional:       Appearance: Normal appearance.   HENT:      Head: Normocephalic.      Nose: Nose normal.      Mouth/Throat:      Mouth: Mucous membranes are moist.      Pharynx: Oropharynx is clear.   Eyes:      Pupils: Pupils are equal, round, and reactive to light.   Cardiovascular:      Rate and Rhythm: Normal rate and regular rhythm.      Pulses: Normal pulses.      Heart sounds: Normal heart sounds.   Pulmonary:      Effort: Pulmonary effort is normal.      Breath sounds: Normal breath sounds.   Abdominal:      General: Bowel sounds are normal.      Palpations: Abdomen is soft.   Musculoskeletal:         General: Swelling present. Normal range of motion.   Skin:     General: Skin is warm and dry.      Capillary Refill: Capillary refill takes 2 to 3 seconds.   Neurological:      Mental Status: She is alert.      Comments: GCS 15  AAO X2-3  PERRL  MUNIZ to command and spontaneously  Sensation Intact X4       MELD-Na: 20 at 6/14/2023  8:23 AM  MELD: 20 at 6/14/2023  8:23 AM  Calculated from:  Serum Creatinine: 4.4 mg/dL (Using max of 4 mg/dL) at 6/14/2023  8:23 AM  Serum Sodium: 140 mmol/L " (Using max of 137 mmol/L) at 6/14/2023  8:23 AM  Total Bilirubin: 0.7 mg/dL (Using min of 1 mg/dL) at 6/14/2023  8:23 AM  INR(ratio): 1.0 at 6/14/2023  5:08 AM      Significant Labs:  CBC:  Recent Labs   Lab 06/13/23  0354 06/14/23  0508   WBC 8.46 9.30   HGB 10.6* 10.5*   HCT 34.3* 32.9*    225     CMP:  Recent Labs   Lab 06/13/23  1642 06/14/23  0508 06/14/23  0823    140 140   K 3.4* 3.4* 3.3*    109 108   CO2 22* 19* 21*    98 105   BUN 35* 34* 33*   CREATININE 4.5* 4.5* 4.4*   CALCIUM 8.6* 8.6* 8.6*   PROT 5.3*  5.3* 5.4* 5.6*  5.6*   ALBUMIN 2.4*  2.4* 2.5* 2.6*  2.6*   BILITOT 0.5  0.5 0.6 0.7  0.7   ALKPHOS 128  128 129 135  135   *  108* 106* 113*  113*   *  828* 737* 766*  766*   ANIONGAP 12 12 11     PTINR:  Recent Labs   Lab 06/14/23  0508   INR 1.0       Significant Procedures:   Dobutamine Stress Test with Color Flow: No results found for this or any previous visit.    None

## 2023-06-15 ENCOUNTER — TELEPHONE (OUTPATIENT)
Dept: HEPATOLOGY | Facility: CLINIC | Age: 68
End: 2023-06-15
Payer: MEDICARE

## 2023-06-15 VITALS
HEART RATE: 87 BPM | BODY MASS INDEX: 27.34 KG/M2 | SYSTOLIC BLOOD PRESSURE: 153 MMHG | DIASTOLIC BLOOD PRESSURE: 70 MMHG | TEMPERATURE: 99 F | HEIGHT: 70 IN | OXYGEN SATURATION: 96 % | RESPIRATION RATE: 17 BRPM | WEIGHT: 190.94 LBS

## 2023-06-15 PROBLEM — G93.40 ACUTE ENCEPHALOPATHY: Status: RESOLVED | Noted: 2023-06-09 | Resolved: 2023-06-15

## 2023-06-15 PROBLEM — R79.89 INCREASED AMMONIA LEVEL: Status: RESOLVED | Noted: 2023-06-09 | Resolved: 2023-06-15

## 2023-06-15 PROBLEM — T42.6X1A GABAPENTIN OVERDOSE: Status: ACTIVE | Noted: 2023-06-09

## 2023-06-15 PROBLEM — I21.4 NSTEMI (NON-ST ELEVATED MYOCARDIAL INFARCTION): Status: RESOLVED | Noted: 2023-06-09 | Resolved: 2023-06-15

## 2023-06-15 PROBLEM — N30.00 ACUTE CYSTITIS: Status: RESOLVED | Noted: 2023-06-09 | Resolved: 2023-06-15

## 2023-06-15 PROBLEM — T42.6X1A GABAPENTIN OVERDOSE: Status: RESOLVED | Noted: 2023-06-09 | Resolved: 2023-06-15

## 2023-06-15 LAB
ALBUMIN SERPL BCP-MCNC: 2.3 G/DL (ref 3.5–5.2)
ALBUMIN SERPL BCP-MCNC: 2.4 G/DL (ref 3.5–5.2)
ALBUMIN SERPL BCP-MCNC: 2.4 G/DL (ref 3.5–5.2)
ALP SERPL-CCNC: 113 U/L (ref 55–135)
ALP SERPL-CCNC: 116 U/L (ref 55–135)
ALP SERPL-CCNC: 116 U/L (ref 55–135)
ALT SERPL W/O P-5'-P-CCNC: 485 U/L (ref 10–44)
AMMONIA PLAS-SCNC: 36 UMOL/L (ref 10–50)
ANION GAP SERPL CALC-SCNC: 11 MMOL/L (ref 8–16)
ANION GAP SERPL CALC-SCNC: 9 MMOL/L (ref 8–16)
AST SERPL-CCNC: 57 U/L (ref 10–40)
AST SERPL-CCNC: 61 U/L (ref 10–40)
AST SERPL-CCNC: 61 U/L (ref 10–40)
BASOPHILS # BLD AUTO: 0.05 K/UL (ref 0–0.2)
BASOPHILS NFR BLD: 0.5 % (ref 0–1.9)
BILIRUB DIRECT SERPL-MCNC: 0.4 MG/DL (ref 0.1–0.3)
BILIRUB SERPL-MCNC: 0.8 MG/DL (ref 0.1–1)
BILIRUB SERPL-MCNC: 0.9 MG/DL (ref 0.1–1)
BILIRUB SERPL-MCNC: 0.9 MG/DL (ref 0.1–1)
BUN SERPL-MCNC: 34 MG/DL (ref 8–23)
BUN SERPL-MCNC: 34 MG/DL (ref 8–23)
CALCIUM SERPL-MCNC: 8.3 MG/DL (ref 8.7–10.5)
CALCIUM SERPL-MCNC: 8.6 MG/DL (ref 8.7–10.5)
CHLORIDE SERPL-SCNC: 104 MMOL/L (ref 95–110)
CHLORIDE SERPL-SCNC: 105 MMOL/L (ref 95–110)
CO2 SERPL-SCNC: 22 MMOL/L (ref 23–29)
CO2 SERPL-SCNC: 23 MMOL/L (ref 23–29)
CREAT SERPL-MCNC: 4.6 MG/DL (ref 0.5–1.4)
CREAT SERPL-MCNC: 4.6 MG/DL (ref 0.5–1.4)
DIFFERENTIAL METHOD: ABNORMAL
EOSINOPHIL # BLD AUTO: 0.3 K/UL (ref 0–0.5)
EOSINOPHIL NFR BLD: 2.9 % (ref 0–8)
ERYTHROCYTE [DISTWIDTH] IN BLOOD BY AUTOMATED COUNT: 14.6 % (ref 11.5–14.5)
EST. GFR  (NO RACE VARIABLE): 9.8 ML/MIN/1.73 M^2
EST. GFR  (NO RACE VARIABLE): 9.8 ML/MIN/1.73 M^2
GLUCOSE SERPL-MCNC: 94 MG/DL (ref 70–110)
GLUCOSE SERPL-MCNC: 99 MG/DL (ref 70–110)
HCT VFR BLD AUTO: 30.1 % (ref 37–48.5)
HGB BLD-MCNC: 9.6 G/DL (ref 12–16)
IMM GRANULOCYTES # BLD AUTO: 0.08 K/UL (ref 0–0.04)
IMM GRANULOCYTES NFR BLD AUTO: 0.7 % (ref 0–0.5)
INR PPP: 1.1 (ref 0.8–1.2)
LYMPHOCYTES # BLD AUTO: 1.2 K/UL (ref 1–4.8)
LYMPHOCYTES NFR BLD: 10.6 % (ref 18–48)
MAGNESIUM SERPL-MCNC: 1.9 MG/DL (ref 1.6–2.6)
MCH RBC QN AUTO: 27 PG (ref 27–31)
MCHC RBC AUTO-ENTMCNC: 31.9 G/DL (ref 32–36)
MCV RBC AUTO: 85 FL (ref 82–98)
MONOCYTES # BLD AUTO: 0.9 K/UL (ref 0.3–1)
MONOCYTES NFR BLD: 8.6 % (ref 4–15)
NEUTROPHILS # BLD AUTO: 8.3 K/UL (ref 1.8–7.7)
NEUTROPHILS NFR BLD: 76.7 % (ref 38–73)
NRBC BLD-RTO: 0 /100 WBC
PHOSPHATE SERPL-MCNC: 3.9 MG/DL (ref 2.7–4.5)
PLATELET # BLD AUTO: 233 K/UL (ref 150–450)
PMV BLD AUTO: 10.4 FL (ref 9.2–12.9)
POTASSIUM SERPL-SCNC: 3 MMOL/L (ref 3.5–5.1)
POTASSIUM SERPL-SCNC: 3.3 MMOL/L (ref 3.5–5.1)
PROT SERPL-MCNC: 5.2 G/DL (ref 6–8.4)
PROT SERPL-MCNC: 5.5 G/DL (ref 6–8.4)
PROT SERPL-MCNC: 5.5 G/DL (ref 6–8.4)
PROTHROMBIN TIME: 11.5 SEC (ref 9–12.5)
RBC # BLD AUTO: 3.56 M/UL (ref 4–5.4)
SODIUM SERPL-SCNC: 136 MMOL/L (ref 136–145)
SODIUM SERPL-SCNC: 138 MMOL/L (ref 136–145)
WBC # BLD AUTO: 10.87 K/UL (ref 3.9–12.7)

## 2023-06-15 PROCEDURE — 1111F DSCHRG MED/CURRENT MED MERGE: CPT | Mod: CPTII,,, | Performed by: HOSPITALIST

## 2023-06-15 PROCEDURE — 80053 COMPREHEN METABOLIC PANEL: CPT | Performed by: EMERGENCY MEDICINE

## 2023-06-15 PROCEDURE — 25000003 PHARM REV CODE 250: Performed by: NURSE PRACTITIONER

## 2023-06-15 PROCEDURE — 36415 COLL VENOUS BLD VENIPUNCTURE: CPT | Performed by: STUDENT IN AN ORGANIZED HEALTH CARE EDUCATION/TRAINING PROGRAM

## 2023-06-15 PROCEDURE — 25000003 PHARM REV CODE 250: Performed by: INTERNAL MEDICINE

## 2023-06-15 PROCEDURE — 25000003 PHARM REV CODE 250: Performed by: HOSPITALIST

## 2023-06-15 PROCEDURE — 85025 COMPLETE CBC W/AUTO DIFF WBC: CPT | Performed by: EMERGENCY MEDICINE

## 2023-06-15 PROCEDURE — 80053 COMPREHEN METABOLIC PANEL: CPT | Mod: 91 | Performed by: NURSE PRACTITIONER

## 2023-06-15 PROCEDURE — 63600175 PHARM REV CODE 636 W HCPCS: Performed by: NURSE PRACTITIONER

## 2023-06-15 PROCEDURE — 1111F PR DISCHARGE MEDS RECONCILED W/ CURRENT OUTPATIENT MED LIST: ICD-10-PCS | Mod: CPTII,,, | Performed by: HOSPITALIST

## 2023-06-15 PROCEDURE — 99233 SBSQ HOSP IP/OBS HIGH 50: CPT | Mod: ,,, | Performed by: INTERNAL MEDICINE

## 2023-06-15 PROCEDURE — 36415 COLL VENOUS BLD VENIPUNCTURE: CPT | Performed by: NURSE PRACTITIONER

## 2023-06-15 PROCEDURE — 99239 HOSP IP/OBS DSCHRG MGMT >30: CPT | Mod: ,,, | Performed by: HOSPITALIST

## 2023-06-15 PROCEDURE — 83735 ASSAY OF MAGNESIUM: CPT | Performed by: EMERGENCY MEDICINE

## 2023-06-15 PROCEDURE — 85610 PROTHROMBIN TIME: CPT | Performed by: STUDENT IN AN ORGANIZED HEALTH CARE EDUCATION/TRAINING PROGRAM

## 2023-06-15 PROCEDURE — 99233 PR SUBSEQUENT HOSPITAL CARE,LEVL III: ICD-10-PCS | Mod: ,,, | Performed by: INTERNAL MEDICINE

## 2023-06-15 PROCEDURE — 99239 PR HOSPITAL DISCHARGE DAY,>30 MIN: ICD-10-PCS | Mod: ,,, | Performed by: HOSPITALIST

## 2023-06-15 PROCEDURE — 82140 ASSAY OF AMMONIA: CPT | Performed by: NURSE PRACTITIONER

## 2023-06-15 PROCEDURE — 84100 ASSAY OF PHOSPHORUS: CPT | Performed by: EMERGENCY MEDICINE

## 2023-06-15 RX ORDER — POTASSIUM CHLORIDE 20 MEQ/1
40 TABLET, EXTENDED RELEASE ORAL ONCE
Status: COMPLETED | OUTPATIENT
Start: 2023-06-15 | End: 2023-06-15

## 2023-06-15 RX ADMIN — HEPARIN SODIUM 5000 UNITS: 5000 INJECTION INTRAVENOUS; SUBCUTANEOUS at 12:06

## 2023-06-15 RX ADMIN — LACTULOSE 15 G: 20 SOLUTION ORAL at 12:06

## 2023-06-15 RX ADMIN — LACTULOSE 15 G: 20 SOLUTION ORAL at 06:06

## 2023-06-15 RX ADMIN — ASPIRIN 325 MG ORAL TABLET 325 MG: 325 PILL ORAL at 10:06

## 2023-06-15 RX ADMIN — HEPARIN SODIUM 5000 UNITS: 5000 INJECTION INTRAVENOUS; SUBCUTANEOUS at 06:06

## 2023-06-15 RX ADMIN — POTASSIUM CHLORIDE 40 MEQ: 1500 TABLET, EXTENDED RELEASE ORAL at 09:06

## 2023-06-15 RX ADMIN — SILODOSIN 4 MG: 4 CAPSULE ORAL at 08:06

## 2023-06-15 NOTE — TELEPHONE ENCOUNTER
----- Message from Bruna Raymundo sent at 6/15/2023  1:35 PM CDT -----  Regarding: Schedule Appt  Pt calling to schedule an appointment with-in the next 7-10 days. Pt is being discharged today, looking to have appointment near [place] if possible. Please advise Requesting a call back.          [phone] (home)

## 2023-06-15 NOTE — TELEPHONE ENCOUNTER
Returned call to patient to schedule hospital follow up from referral.  No answer, left voicemail with call back # [phone].

## 2023-06-15 NOTE — CARE UPDATE
Message sent to the Hepatology nurse to get an appointment for the patient in the [place] area.  They will contact the patient with a date       Message sent to the Nephrology nurse to get an appointment for the patient in the [place] area.  They will contact the patient with a date

## 2023-06-15 NOTE — PROGRESS NOTES
[facility] - Intensive Care ([place] [address])  Nephrology  Progress Note    Patient Name: Tona Zafar  MRN: [id number]  Admission Date: 6/8/2023  Hospital Length of Stay: 7 days  Attending Provider: Celso Marti MD   Primary Care Physician: Hubert Barrera MD  Principal Problem:Acute encephalopathy    Subjective:     HPI: Tona Zafar is a 68 y.o. 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 Originally discharge 6/7/23 following surgery with concepcion for urinary retention, tamsulosin, oxycodone for post-op pain, gabapentin, and Keppra for sz ppx. Developed worsening lethargy and AMS and couldn't be around the following day. Went to [facility] ED and found to be in septic shock 2/2 Enterococcus UTI requiring Levo. Mentation improved with Narcan infusion. Associated NSTEMI, shock liver, hypoxemic respiratory failure requiring face mask, and KAROLINA with Cr 1.4 from baseline 0.6-0.8. Has been increasing each day > 3.2 > 3.7 > 4.2 > 4.3 > 4.5 today. Receiving LR at 75 ml/hr since 6/10/23. Received abx including vanc and CTX the first 2 days and amoxicillin after. No NSAIDs or contrast exposure. Has had some urinary retention requiring straight caths here. None in the last 24 hours. Weaned off Levo and stepped down 2 days ago.       Interval History: Cr 4.6. Suspect remains in plateau phase. Good UOP. F/u RFP for downtrend soon.    Review of Systems   Constitutional:  Positive for fatigue. Negative for chills and fever.   HENT:  Negative for sore throat.    Respiratory:  Negative for cough and shortness of breath.    Cardiovascular:  Negative for chest pain and leg swelling.   Gastrointestinal:  Negative for abdominal pain, constipation, diarrhea, nausea and vomiting.   Genitourinary:  Positive for difficulty urinating. Negative for dysuria.   Musculoskeletal:  Negative for myalgias.   Skin:   Negative for rash.   Neurological:  Negative for dizziness and headaches.   Psychiatric/Behavioral:  Negative for confusion.    Objective:     Vital Signs (Most Recent):  Temp: 98.6 °F (37 °C) (06/15/23 1215)  Pulse: 87 (06/15/23 1215)  Resp: 17 (06/15/23 1215)  BP: (!) 153/70 (06/15/23 1215)  SpO2: 96 % (06/15/23 1215) Vital Signs (24h Range):  Temp:  [98 °F (36.7 °C)-99.5 °F (37.5 °C)] 98.6 °F (37 °C)  Pulse:  [] 87  Resp:  [17-24] 17  SpO2:  [92 %-96 %] 96 %  BP: (147-165)/(70-79) 153/70     Weight: 86.6 kg (190 lb 14.7 oz) (06/09/23 1118)  Body mass index is 27.39 kg/m².  Body surface area is 2.07 meters squared.    I/O last 3 completed shifts:  In: 582 [P.O.:582]  Out: 500 [Urine:500]    Physical Exam  Constitutional:       General: She is not in acute distress.     Appearance: Normal appearance.   HENT:      Head: Normocephalic and atraumatic.      Mouth/Throat:      Mouth: Mucous membranes are moist.      Pharynx: Oropharynx is clear.   Eyes:      Extraocular Movements: Extraocular movements intact.      Pupils: Pupils are equal, round, and reactive to light.   Cardiovascular:      Rate and Rhythm: Normal rate and regular rhythm.      Pulses: Normal pulses.      Heart sounds: Normal heart sounds.   Pulmonary:      Effort: Pulmonary effort is normal. No respiratory distress.      Breath sounds: Normal breath sounds.   Abdominal:      General: Abdomen is flat. Bowel sounds are normal. There is no distension.      Palpations: Abdomen is soft.   Musculoskeletal:         General: No swelling.      Cervical back: Neck supple.   Skin:     General: Skin is warm and dry.      Capillary Refill: Capillary refill takes less than 2 seconds.   Neurological:      General: No focal deficit present.      Mental Status: She is alert and oriented to person, place, and time. Mental status is at baseline.   Psychiatric:         Mood and Affect: Mood normal.         Behavior: Behavior normal.         Thought Content: Thought  content normal.         Judgment: Judgment normal.        Significant Labs:  All labs within the past 24 hours have been reviewed.    Significant Imaging:  Reviewed    Assessment/Plan:     Renal/  KAROLINA (acute kidney injury)  -- KAROLINA likely ischemic ATN in s/o shock  -- Remains hemodynamically stable  -- No exposure to contrast, NSAIDs, or other nephrotoxins noted  -- Euvolemic on exam. Good UOP  -- UA on 6/9 with hematuria, proteinuria, pyuria, bacteria  -- RP US without hydronephrosis, simple left renal cyst. Unlikely obstruction from urinary retention contributing  -- Urine protein undetectable.  -- Evidence of ATN on spinning urine  -- Good UOP  -- Cr 4.5 > 4.6. Suspect remains in plateau phase of ischemic ATN    Recommendations:  -- No changes today.  -- F/u RFP in AM for improvement in Cr soon  -- No indication for RRT  -- Agree with silodosin          Thank you for your consult. I will follow-up with patient. Please contact us if you have any additional questions.    Mony Murrell,   Nephrology  [facility] - Intensive Care ([place] [address])    ATTENDING PHYSICIAN ATTESTATION  I have personally verified the history and examined the patient. I thoroughly reviewed the demographic, clinical, laboratorial and imaging information available in medical records. I agree with the assessment and recommendations provided by the subspecialty resident who was under my supervision.

## 2023-06-15 NOTE — ASSESSMENT & PLAN NOTE
-- KAROLINA likely ischemic ATN in s/o shock  -- Remains hemodynamically stable  -- No exposure to contrast, NSAIDs, or other nephrotoxins noted  -- Euvolemic on exam. Good UOP  -- UA on 6/9 with hematuria, proteinuria, pyuria, bacteria  -- RP US without hydronephrosis, simple left renal cyst. Unlikely obstruction from urinary retention contributing  -- Urine protein undetectable.  -- Evidence of ATN on spinning urine  -- Good UOP  -- Cr 4.5 > 4.6. Suspect remains in plateau phase of ischemic ATN    Recommendations:  -- No changes today.  -- F/u RFP in AM for improvement in Cr soon  -- No indication for RRT  -- Agree with silodosin

## 2023-06-15 NOTE — DISCHARGE SUMMARY
[facility] - Intensive Care ([address] Medicine  Discharge Summary      Patient Name: Tona Zafar  MRN: [id number]  [facility]: [id number]  Patient Class: IP- Inpatient  Admission Date: 6/8/2023  Hospital Length of Stay: 7 days  Discharge Date and Time: 6/15/2023  2:16 PM  Attending Physician: Celso Marti MD   Discharging Provider: Celso Marti MD  Primary Care Provider: Hubert Barrera MD  [facility] Medicine Team: [facility] MED A Celso Marti MD  Primary Care Team: [facility] MED A    HPI:   Tona Zafar is a 68 year old white woman with former cigarette smoking, history of cholecystectomy in June 2012, history of appendectomy, history of breast augmentation in 2005, history 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 She lives in [place], [place]. She is . Her primary care physician is Dr. Hubert Barrera.               She was hospitalized at [facility] from 6/5/2023 to 6/7/2023 for tegmen defect. She was prescribed gabapentin, levetiracetam, and oxycodone-acetaminophen  mg. She was also discharged with an indwelling Cedillo catheter and prescribed tamsulosin for urinary retention.               She presented to [facility] Emergency Department on 6/8/2023 with altered mental status. Her  noted that she was weak and could not stand on the way home from the hospital. He gave her gabapentin and oxycodone-acetaminophen at 18:00. He gave a subsequent dose at 02:00 on the day of presentation. At 06:00, he found her unresponsive and sweating. He called emergency medical services. Oxygen saturation was initially in the low 50s. She was given  4 mg of naloxone, resulting in agitation. She was somnolent and aroused to physical stimuli. She could follow basic commands when she was awake. Sharples coma scale was around 10. She was initially hypotensive and required norepinephrine. In the emergency department, she was given lactated Ringer's solution and naloxone. Afterward, she was disoriented but able to say her name and recognize her family. She became somnolent again and responded to naloxone. She was placed on naloxone infusion. She was found to have acute liver failure, acute kidney injury, elevated troponin, and urinary tract infection. She was given broad-spectrum antibiotics. Head CT showed postsurgical changes with decreasing pneumocephalus. She was transferred to [facility] and admitted to Neuro Critical Care.        Hospital Course:   Gabapentin and levetiracetam were discontinued. She was given oxycodone as needed but at a lower dose. She had elevated ammonia. She was thought to have hepatic encephalopathy. She was given ceftriaxone for her urinary traction infection. Mental status returned to baseline on 6/13/2023. Cedillo catheter was able to be removed and she urinated on her own. Liver enzymes were improving. Creatinine had not yet improved. Nephrology was consulted and spun her urine, finding evidence of acute tubular necrosis, and recommended observing. Labs will be repeated outpatient.       Goals of Care Treatment Preferences:  Code Status: Full Code      Consults:   Consults (From admission, onward)          Status Ordering Provider     Inpatient consult to Nephrology  Once        Provider:  (Not yet assigned)    Completed SATISH MANN     Pharmacy to dose Vancomycin consult  Once        Provider:  (Not yet assigned)   See Hyperspace for full Linked Orders Report.    Completed ASIA PERRY     Inpatient consult to Physical Medicine Rehab  Once        Provider:  (Not yet assigned)    Completed RICKY GREGORY      Inpatient consult to Registered Dietitian/Nutritionist  Once        Provider:  (Not yet assigned)    Completed RICKY GREGORY     IP consult to case management/social work  Once        Provider:  (Not yet assigned)    Acknowledged RICKY GREGORY     Inpatient consult to Cardiology  Once        Provider:  (Not yet assigned)    Completed ROSENDO DANIEL     Inpatient consult to Neurosurgery  Once        Provider:  (Not yet assigned)    Completed ROSENDO DANIEL     Inpatient consult to Hepatology  Once        Provider:  (Not yet assigned)    Completed ROSENDO DANIEL          Final Active Diagnoses:    Diagnosis Date Noted POA    ATN (acute tubular necrosis) [N17.0] 06/14/2023 Yes    KAROLINA (acute kidney injury) [N17.9] 06/09/2023 Yes    Demand ischemia of myocardium [I24.8] 06/09/2023 Yes    Acute liver failure [K72.00] 06/08/2023 Yes    Elevated troponin [R77.8] 06/08/2023 Yes    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] 04/12/2023 Yes     Chronic      Problems Resolved During this Admission:    Diagnosis Date Noted Date Resolved POA    PRINCIPAL PROBLEM:  Acute encephalopathy [G93.40] 06/09/2023 06/15/2023 Yes    NSTEMI (non-ST elevated myocardial infarction) [I21.4] 06/09/2023 06/15/2023 Yes    Increased ammonia level [R79.89] 06/09/2023 06/15/2023 Yes    Gabapentin overdose [T42.6X1A] 06/09/2023 06/15/2023 Yes    Acute cystitis [N30.00] 06/09/2023 06/15/2023 Yes       Discharged Condition: good    Disposition: Home-Health Care [facility]    Follow Up:   Follow-up Information       Gasper Griffith MD Follow up.    Specialty: Otolaryngology  Contact information:  [facility] [id number] [phone]                           Patient Instructions:      Ambulatory referral/consult to Outpatient Case Management   Referral Priority: Routine Referral Type: Consultation   Referral Reason: Specialty Services Required   Number of Visits Requested: 1     Diet Adult Regular     Notify your health care provider if you experience  any of the following:  increased confusion or weakness     Activity as tolerated       Significant Diagnostic Studies:   CT Head Without Contrast 6/09/23:   FINDINGS:   Postoperative changes in the left temporal region with craniotomy flap again noted.  There is a low density focus along the inferior temporal lobes similar to the prior exam.  No acute intracranial hemorrhage.  No new focal lesion or mass effect.  Ventricles, cisterns and sulci appear stable.  Posterior fossa structures and pituitary gland appear stable and unremarkable as imaged.  Complete opacification of the left mastoid air cells and middle ear opacification again noted.   Scalp postoperative changes with swelling and air in the soft tissues over the left temporal bone again noted.   Impression:  Left temporal lobe low density raise question of infarct versus postoperative changes in the surgical bed.   No acute hemorrhage or detrimental change otherwise.   Postoperative changes in the left temporal fossa.   Fluid in the middle ear and mastoids on the left.   US Liver with Doppler 6/09/23: FINDINGS:   Liver: Normal in size, measuring 14.8 cm.  The hepatorenal index is 1.5.  No focal hepatic lesions.   Gallbladder: The gallbladder is surgically absent.   Biliary system: The common duct is not significant dilated, measuring 6 mm.  No intrahepatic ductal dilatation.   Spleen: Normal in size measuring 8.0 x 3.1 cm, with a homogeneous echotexture.   Pancreas: Upper normal pancreatic duct caliber, measuring 4 mm.  The visualized portions of pancreas appear otherwise normal.   Inferior vena cava: Normal in appearance.   Miscellaneous: Right pleural effusion, partially visualized.  No ascites.   Main hepatic artery: Patent.   Main portal vein: Patent with proper directional flow.  1.2 cm caliber.  Peak velocity measures 24.0 cm/sec, within normal limits.   Left portal vein:  Patent with proper directional flow.   Right portal vein:  Patent with proper  directional flow.   Splenic vein: Patent with proper directional flow.   SMV:  Patent with proper directional flow.   IVC: Patent   Left, middle, and right hepatic veins: Patent.   Umbilical vein: Not patent.   Collaterals: None.   Impression:  Satisfactory Doppler evaluation of the liver.   Cholecystectomy.   Right pleural effusion, partially visualized.   Transthoracic echo complete 6/09/23:   The left ventricle is normal in size with low normal systolic function. The estimated ejection fraction is 50%.  There are segmental left ventricular wall motion abnormalities.  Normal right ventricular size with normal right ventricular systolic function.  Normal left ventricular diastolic function.  The estimated PA systolic pressure is 38 mmHg.  Elevated central venous pressure (15 mmHg). The IVC is markedly dilated with no collapse seen during the respiratory cycle.  US Retroperitoneal Complete 6/13/23: FINDINGS:   Right kidney: The right kidney measures 9.4 cm. No cortical thinning. No loss of corticomedullary distinction. Resistive index measures 0.75.  No mass. No renal stone. No hydronephrosis.   Left kidney: The left kidney measures 10.3 cm. No cortical thinning. No loss of corticomedullary distinction. Resistive index measures 0.77.  No solid mass.  Upper pole simple renal cyst measuring 1.3 x 1.9 x 1.7 cm.  No renal stone. No hydronephrosis.   Splenic resistive index: 0.63.   The bladder is partially distended at the time of scanning and has an unremarkable appearance.   Impression:  No hydronephrosis.   Simple left renal cyst.   US Lower Extremity Veins Bilateral 6/14/23: FINDINGS:   Right thigh veins: The common femoral, femoral, popliteal, upper greater saphenous, and deep femoral veins are patent and free of thrombus. The veins are normally compressible and have normal phasic flow and augmentation response.   Right calf veins: The visualized calf veins are patent.   Left thigh veins: The common femoral,  femoral, popliteal, upper greater saphenous, and deep femoral veins are patent and free of thrombus. The veins are normally compressible and have normal phasic flow and augmentation response.   Left calf veins: The visualized calf veins are patent.   Miscellaneous: Left popliteal fossa cyst measuring 5.1 x 1.1 x 2.9 cm.  Mild bilateral lower extremity soft tissue edema   Impression:  1. No evidence of deep venous thrombosis in either lower extremity.   2. Left popliteal fossa cyst.      Medications:  Reconciled Home Medications:      Medication List        CONTINUE taking these medications      hydrOXYzine HCL 25 MG tablet  Commonly known as: ATARAX  Take 1 tablet (25 mg total) by mouth 3 (three) times daily as needed for Anxiety.     K2 PLUS D3 ORAL  Take 1 capsule by mouth once daily.     magnesium 200 mg Tab  Take 200 mg by mouth once daily.     magnesium hydroxide 400 mg/5 ml 400 mg/5 mL Susp  Commonly known as: MILK OF MAGNESIA  Take 30 mLs by mouth daily as needed.     microfibrillar collagen powder  Apply 1 g topically as needed.     oxyCODONE-acetaminophen  mg per tablet  Commonly known as: PERCOCET  Take 1 tablet by mouth every 6 (six) hours as needed for Pain.     tamsulosin 0.4 mg Cap  Commonly known as: FLOMAX  Take 1 capsule (0.4 mg total) by mouth once daily.     tumeric-ging-olive-oreg-capryl 100 mg-150 mg- 50 mg-150 mg Cap  Take by mouth.     TURMERIC ORAL  Take 1 capsule by mouth once daily.     UNABLE TO FIND  Take 1 capsule by mouth once daily. medication name: Beet root     UNABLE TO FIND  Take 1 capsule by mouth once daily. medication name: Federica Root     UNABLE TO FIND  Take 2 capsules by mouth once daily. medication name: Nitric Oxide     zinc 50 mg Tab  Take 50 mg by mouth once daily.            STOP taking these medications      gabapentin 300 MG capsule  Commonly known as: NEURONTIN     levETIRAcetam 500 MG Tab  Commonly known as: KEPPRA              Indwelling Lines/Drains at time of  discharge: None      Time spent on the discharge of patient: 35 minutes         Celso Marti MD  Department of Hospital Medicine  [facility] - Intensive Care ([address] [place])

## 2023-06-15 NOTE — SUBJECTIVE & OBJECTIVE
Interval History: Cr 4.6. Suspect remains in plateau phase. Good UOP. F/u RFP for downtrend soon.    Review of Systems   Constitutional:  Positive for fatigue. Negative for chills and fever.   HENT:  Negative for sore throat.    Respiratory:  Negative for cough and shortness of breath.    Cardiovascular:  Negative for chest pain and leg swelling.   Gastrointestinal:  Negative for abdominal pain, constipation, diarrhea, nausea and vomiting.   Genitourinary:  Positive for difficulty urinating. Negative for dysuria.   Musculoskeletal:  Negative for myalgias.   Skin:  Negative for rash.   Neurological:  Negative for dizziness and headaches.   Psychiatric/Behavioral:  Negative for confusion.    Objective:     Vital Signs (Most Recent):  Temp: 98.6 °F (37 °C) (06/15/23 1215)  Pulse: 87 (06/15/23 1215)  Resp: 17 (06/15/23 1215)  BP: (!) 153/70 (06/15/23 1215)  SpO2: 96 % (06/15/23 1215) Vital Signs (24h Range):  Temp:  [98 °F (36.7 °C)-99.5 °F (37.5 °C)] 98.6 °F (37 °C)  Pulse:  [] 87  Resp:  [17-24] 17  SpO2:  [92 %-96 %] 96 %  BP: (147-165)/(70-79) 153/70     Weight: 86.6 kg (190 lb 14.7 oz) (06/09/23 1118)  Body mass index is 27.39 kg/m².  Body surface area is 2.07 meters squared.    I/O last 3 completed shifts:  In: 582 [P.O.:582]  Out: 500 [Urine:500]     Physical Exam  Constitutional:       General: She is not in acute distress.     Appearance: Normal appearance.   HENT:      Head: Normocephalic and atraumatic.      Mouth/Throat:      Mouth: Mucous membranes are moist.      Pharynx: Oropharynx is clear.   Eyes:      Extraocular Movements: Extraocular movements intact.      Pupils: Pupils are equal, round, and reactive to light.   Cardiovascular:      Rate and Rhythm: Normal rate and regular rhythm.      Pulses: Normal pulses.      Heart sounds: Normal heart sounds.   Pulmonary:      Effort: Pulmonary effort is normal. No respiratory distress.      Breath sounds: Normal breath sounds.   Abdominal:      General:  Abdomen is flat. Bowel sounds are normal. There is no distension.      Palpations: Abdomen is soft.   Musculoskeletal:         General: No swelling.      Cervical back: Neck supple.   Skin:     General: Skin is warm and dry.      Capillary Refill: Capillary refill takes less than 2 seconds.   Neurological:      General: No focal deficit present.      Mental Status: She is alert and oriented to person, place, and time. Mental status is at baseline.   Psychiatric:         Mood and Affect: Mood normal.         Behavior: Behavior normal.         Thought Content: Thought content normal.         Judgment: Judgment normal.        Significant Labs:  All labs within the past 24 hours have been reviewed.    Significant Imaging:  Reviewed

## 2023-06-15 NOTE — PLAN OF CARE
"   06/15/23 0951   Post-Acute Status   Post-Acute Authorization Home Health   Home Health Status Set-up Complete/Auth obtained   Coverage [employer] MANAGED MEDICARE - [employer] MED[employer] HMO   Hospital Resources/Appts/Education Provided Provided patient/caregiver with written discharge plan information;Appointments scheduled and added to AVS   Discharge Delays None known at this time   Discharge Plan   Discharge Plan A Home Health  ([facility] Phone: [phone])   Discharge Plan B Home with family     CM updated both patient and patients spouse discharge plans and HH orders sent via [facility] Port. CHW to schedule follow up appointments.    [id number] pm  CM spoke with both the patient and spouse.  The spouse reported, "I just don't want her coming back to the hospital and she was discharged from [facility] and I thought she was going to die."  Spouse explained that patient was not herself, "she was confused and not following commands."  CM explained the patient will have [facility] PT/OT to evaluate and treat and determined how often the patient will be seen.  CM explained that the patient will have follow up appointments with kidney, liver and PCP.  Patients blood will be collected to monitor liver and kidney levels.  Patient has been referred to outpatient case management for further monitoring.  CM explained what signs to observe for involving her kidney and liver levels. CM provided patient./family handout.  Patient requesting a standard walker.      Saumya Borjas RN  Case Management  [facility]  [phone]    "

## 2023-06-15 NOTE — NURSING
Discharge education and paperwork given. Patient left via wheelchair with all belongings in stable condition.

## 2023-06-15 NOTE — PLAN OF CARE
[facility] ([place] [address])      HOME HEALTH ORDERS  FACE TO FACE ENCOUNTER    Patient Name: Tona Zafar  YOB: 1955    PCP: Hubert Barrera MD   PCP Address: [id number] [place] [address] Shruti WILSON [address]  PCP Phone Number: [phone]  PCP Fax: [phone]    Encounter Date: 6/8/23    Admit to Home Health    Diagnoses:  Active Hospital Problems    Diagnosis  POA    ATN (acute tubular necrosis) [N17.0]  Yes    KAROLINA (acute kidney injury) [N17.9]  Yes    Demand ischemia of myocardium [I24.8]  Yes    Acute liver failure [K72.00]  Yes    Elevated troponin [R77.8]  Yes    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       Allergies:Review of patient's allergies indicates:  No Known Allergies    Medications: Review discharge medications with patient and family and provide education.    Current Facility-Administered Medications   Medication Dose Route Frequency Provider Last Rate Last Admin    aspirin tablet 325 mg  325 mg Oral Q6H PRN Salvatore Horn MD   325 mg at 06/13/23 2014    heparin (porcine) injection 5,000 Units  5,000 Units Subcutaneous Q8H Saumya Field NP   5,000 Units at 06/15/23 0632    hydrOXYzine HCL tablet 25 mg  25 mg Oral TID PRN Delroy Sosa DO   25 mg at 06/14/23 2035    labetalol 20 mg/4 mL (5 mg/mL) IV syring  10 mg Intravenous Q15 Min PRN Marek Macdonald MD   10 mg at 06/11/23 1282    lactulose 20 gram/30 mL solution Soln 15 g  15  g Oral Q6H Boston Malik, NP   15 g at 06/15/23 0632    magnesium oxide tablet 800 mg  800 mg Oral PRN Saumya L Love, NP   800 mg at 06/09/23 1236    magnesium oxide tablet 800 mg  800 mg Oral PRN Saumya L Love, NP   800 mg at 06/09/23 1231    ondansetron injection 4 mg  4 mg Intravenous Q6H PRN Saumya L Love, NP   4 mg at 06/14/23 0111    oxyCODONE 5 mg/5 mL solution 2.5 mg  2.5 mg Oral Q4H PRN Boston Malik, NP   2.5 mg at 06/12/23 2147    potassium bicarbonate disintegrating tablet 35 mEq  35 mEq Oral PRN Saumya L Love, NP        potassium bicarbonate disintegrating tablet 50 mEq  50 mEq Oral PRN Saumya L Love, NP        potassium bicarbonate disintegrating tablet 60 mEq  60 mEq Oral PRN Saumya L Love, NP        potassium chloride SA CR tablet 40 mEq  40 mEq Oral Once Celso Marti MD        potassium, sodium phosphates 280-160-250 mg packet 2 packet  2 packet Oral PRN Saumya L Love, NP   2 packet at 06/09/23 1236    potassium, sodium phosphates 280-160-250 mg packet 2 packet  2 packet Oral PRN Saumya L Love, NP        potassium, sodium phosphates 280-160-250 mg packet 2 packet  2 packet Oral PRN Saumya L Love, NP        silodosin capsule 4 mg  4 mg Oral Daily Saumya L Love, NP   4 mg at 06/15/23 0817    sodium chloride 0.9% flush 10 mL  10 mL Intravenous PRN Marek Macdonald MD         Current Discharge Medication List        CONTINUE these medications which have NOT CHANGED    Details   hydrOXYzine HCL (ATARAX) 25 MG tablet Take 1 tablet (25 mg total) by mouth 3 (three) times daily as needed for Anxiety.  Qty: 42 tablet, Refills: 0    Associated Diagnoses: Anxiety      magnesium 200 mg Tab Take 200 mg by mouth once daily.      magnesium hydroxide 400 mg/5 ml (MILK OF MAGNESIA) 400 mg/5 mL Susp Take 30 mLs by mouth daily as needed.      microfibrillar collagen powder Apply 1 g topically as needed.      oxyCODONE-acetaminophen (PERCOCET)  mg per tablet Take 1 tablet by mouth every 6 (six) hours as needed for  Pain.  Qty: 40 tablet, Refills: 0    Comments: Quantity prescribed more than 7 day supply? Yes, quantity medically necessary      tamsulosin (FLOMAX) 0.4 mg Cap Take 1 capsule (0.4 mg total) by mouth once daily.  Qty: 30 capsule, Refills: 11      tumeric-ging-olive-oreg-capryl 100 mg-150 mg- 50 mg-150 mg Cap Take by mouth.      TURMERIC ORAL Take 1 capsule by mouth once daily.      !! UNABLE TO FIND Take 1 capsule by mouth once daily. medication name: Beet root      !! UNABLE TO FIND Take 1 capsule by mouth once daily. medication name: Federica Root      !! UNABLE TO FIND Take 2 capsules by mouth once daily. medication name: Nitric Oxide      vitamin D3/vitamin K2, MK4, (K2 PLUS D3 ORAL) Take 1 capsule by mouth once daily.      zinc 50 mg Tab Take 50 mg by mouth once daily.       !! - Potential duplicate medications found. Please discuss with provider.        STOP taking these medications       gabapentin (NEURONTIN) 300 MG capsule Comments:   Reason for Stopping:         levETIRAcetam (KEPPRA) 500 MG Tab Comments:   Reason for Stopping:                 I have seen and examined this patient within the last 30 days. My clinical findings that support the need for the home health skilled services and home bound status are the following:no   Weakness/numbness causing balance and gait disturbance due to Surgery making it taxing to leave home.     Diet:   regular diet    Labs:  SN to perform labs:  BMP: Weekly; 3 week(s) and Report Lab results to PCP.    Referrals/ Consults  Physical Therapy to evaluate and treat. Evaluate for home safety and equipment needs; Establish/upgrade home exercise program. Perform / instruct on therapeutic exercises, gait training, transfer training, and Range of Motion.  Occupational Therapy to evaluate and treat. Evaluate home environment for safety and equipment needs. Perform/Instruct on transfers, ADL training, ROM, and therapeutic exercises.    Activities:   activity as tolerated    Nursing:    Agency to admit patient within 24 hours of hospital discharge unless specified on physician order or at patient request    SN to complete comprehensive assessment including routine vital signs. Instruct on disease process and s/s of complications to report to MD. Review/verify medication list sent home with the patient at time of discharge  and instruct patient/caregiver as needed. Frequency may be adjusted depending on start of care date.     Skilled nurse to perform up to 3 visits PRN for symptoms related to diagnosis    Notify MD if SBP > 160 or < 90; DBP > 90 or < 50; HR > 120 or < 50; Temp > 101; O2 < 88%    Ok to schedule additional visits based on staff availability and patient request on consecutive days within the home health episode.    When multiple disciplines ordered:    Start of Care occurs on Sunday - Wednesday schedule remaining discipline evaluations as ordered on separate consecutive days following the start of care.    Thursday SOC -schedule subsequent evaluations Friday and Monday the following week.     Friday - Saturday SOC - schedule subsequent discipline evaluations on consecutive days starting Monday of the following week.    For all post-discharge communication and subsequent orders please contact patient's primary care physician. If unable to reach primary care physician or do not receive response within 30 minutes, please contact Dr. Celso Marti for clinical staff order clarification    Miscellaneous   N/a    Home Health Aide:  Physical Therapy Three times weekly and Occupational Therapy Three times weekly    Wound Care Orders  no    I certify that this patient is confined to her home and needs physical therapy and occupational therapy.        Electronically signed  Celso Marti MD  [facility]  06/15/2023

## 2023-06-15 NOTE — PLAN OF CARE
Problem: Adult Inpatient Plan of Care  Goal: Plan of Care Review  Outcome: Met  Goal: Patient-Specific Goal (Individualized)  Outcome: Met  Goal: Absence of Hospital-Acquired Illness or Injury  Outcome: Met  Goal: Optimal Comfort and Wellbeing  Outcome: Met  Goal: Readiness for Transition of Care  Outcome: Met     Problem: Skin Injury Risk Increased  Goal: Skin Health and Integrity  Outcome: Met     Problem: Adjustment to Illness (Stroke, Hemorrhagic)  Goal: Optimal Coping  Outcome: Met     Problem: Bowel Elimination Impaired (Stroke, Hemorrhagic)  Goal: Effective Bowel Elimination  Outcome: Met     Problem: Cerebral Tissue Perfusion (Stroke, Hemorrhagic)  Goal: Optimal Cerebral Tissue Perfusion  Outcome: Met     Problem: Cognitive Impairment (Stroke, Hemorrhagic)  Goal: Optimal Cognitive Function  Outcome: Met     Problem: Communication Impairment (Stroke, Hemorrhagic)  Goal: Effective Communication Skills  Outcome: Met     Problem: Functional Ability Impaired (Stroke, Hemorrhagic)  Goal: Optimal Functional Ability  Outcome: Met     Problem: Pain (Stroke, Hemorrhagic)  Goal: Acceptable Pain Control  Outcome: Met     Problem: Respiratory Compromise (Stroke, Hemorrhagic)  Goal: Effective Oxygenation and Ventilation  Outcome: Met     Problem: Sensorimotor Impairment (Stroke, Hemorrhagic)  Goal: Improved Sensorimotor Function  Outcome: Met     Problem: Swallowing Impairment (Stroke, Hemorrhagic)  Goal: Optimal Eating and Swallowing Without Aspiration  Outcome: Met     Problem: Urinary Elimination Impaired (Stroke, Hemorrhagic)  Goal: Effective Urinary Elimination  Outcome: Met     Problem: Fluid and Electrolyte Imbalance (Acute Kidney Injury/Impairment)  Goal: Fluid and Electrolyte Balance  Outcome: Met     Problem: Oral Intake Inadequate (Acute Kidney Injury/Impairment)  Goal: Optimal Nutrition Intake  Outcome: Met     Problem: Renal Function Impairment (Acute Kidney Injury/Impairment)  Goal: Effective Renal  Function  Outcome: Met     Problem: Adjustment to Illness (Delirium)  Goal: Optimal Coping  Outcome: Met     Problem: Altered Behavior (Delirium)  Goal: Improved Behavioral Control  Outcome: Met     Problem: Attention and Thought Clarity Impairment (Delirium)  Goal: Improved Attention and Thought Clarity  Outcome: Met     Problem: Sleep Disturbance (Delirium)  Goal: Improved Sleep  Outcome: Met     Problem: Infection  Goal: Absence of Infection Signs and Symptoms  Outcome: Met     Problem: Impaired Wound Healing  Goal: Optimal Wound Healing  Outcome: Met     Problem: Fall Injury Risk  Goal: Absence of Fall and Fall-Related Injury  Outcome: Met

## 2023-06-16 ENCOUNTER — PATIENT OUTREACH (OUTPATIENT)
Dept: ADMINISTRATIVE | Facility: CLINIC | Age: 68
End: 2023-06-16
Payer: MEDICARE

## 2023-06-16 NOTE — PROGRESS NOTES
C3 nurse spoke with Tona Zafar for a TCC post hospital discharge follow up call. The patient reports does not have a scheduled HOSFU appointment. C3 nurse was unable to schedule HOSFU appointment for [facility] PCP. Patient advised to contact their PCP to schedule a HOSPFU within 5-7 days.       No NP in pts. area.  Instructed pt. to call for a HOSPFU with [facility] PCP within 5-7 days of d/c.   Stated understanding.

## 2023-06-16 NOTE — PROGRESS NOTES
C3 nurse attempted to contact Tona Zafar for a TCC post hospital discharge follow up call. No answer. Left voicemail with callback information. The patient does not have a scheduled HOSFU appointment. Message sent to PCP staff for assistance with scheduling visit with patient.

## 2023-06-16 NOTE — PLAN OF CARE
06/16/23 0747   Final Note   Assessment Type Final Discharge Note   Anticipated Discharge Disposition  Care CT   What phone number can be called within the next 1-3 days to see how you are doing after discharge? [phone]   Hospital Resources/Appts/Education Provided Appointments scheduled and added to AVS;Provided patient/caregiver with written discharge plan information;Community resources provided   Post-Acute Status   Post-Acute Authorization Home Health   Home Health Status Set-up Complete/Auth obtained   Coverage [employer] MANAGED MEDICARE - [employer] MEDICARE HMO   Discharge Delays None known at this time     Saumya Borjas RN  Case Management  [facility]  [phone]

## 2023-06-16 NOTE — TELEPHONE ENCOUNTER
See phone call from 6/13/23, have already tried to reach pt to schedule and left message for her to return call. Will try again today.

## 2023-06-16 NOTE — PLAN OF CARE
"12:51 pm  CM received a secure chat from Eli Foss LPN re: the patients  having concerns with his wife being in pain and she didn't receive anything for pain."  CM explained to the  what medications were listed on the AVS or discharge summary and that hepatology and ne photology referrals will call him with the date and time , since the patient  requested f/u appointments be scheduled in [facility].  CM emailed the patient a cpy of AVS/discharge summary.    145 pm  CM called and spoke to Alexandra at [facility] in [place] and patient was admitted and Vital signs stable and patient was not in any distress according to the   nurse notes.      Saumya Borjas RN  Case Management  [facility]  [phone]    "

## 2023-06-16 NOTE — PLAN OF CARE
[facility] - [facility] ([facility])  Discharge Final Note    Primary Care Provider: Hubert Barrera MD    Expected Discharge Date: 6/15/2023    Final Discharge Note (most recent)       Final Note - 06/16/23 0747          Final Note    Assessment Type Final Discharge Note     Anticipated Discharge Disposition Home-Health Care c with Planned Readmission     What phone number can be called within the next 1-3 days to see how you are doing after discharge? [phone]     Hospital Resources/Appts/Education Provided Appointments scheduled and added to AVS;Provided patient/caregiver with written discharge plan information;Community resources provided        Post-Acute Status    Post-Acute Authorization Home Health     Home Health Status Set-up Complete/Auth obtained     Coverage [employer] MANAGED MEDICARE - [employer] MEDICARE HMO     Discharge Delays None known at this time                     Important Message from Medicare  Important Message from Medicare regarding Discharge Appeal Rights: Explained to patient/caregiver, Signed/date by patient/caregiver     Date IMM was signed: 06/15/23  Time IMM was signed: 0955    Contact Info       Gasper Griffith MD   Specialty: Otolaryngology    [address]  [facility] [address]   Phone: [phone]       Next Steps: Follow up          Future Appointments   Date Time Provider Department Center   7/11/2023  1:45 PM Gasper Griffith MD [facility]     Patient dc's home with spouse and HH set up. Patient requested standard walker and BSC.  Patient and spouse left without HME. CM explained the items will be reviewed and delivered once insurance is verified.  Spouse decided not to wait.      Saumya Borjas RN  Case Management  [facility]  [phone]

## 2023-06-19 ENCOUNTER — TELEPHONE (OUTPATIENT)
Dept: GASTROENTEROLOGY | Facility: CLINIC | Age: 68
End: 2023-06-19
Payer: MEDICARE

## 2023-06-19 ENCOUNTER — PATIENT MESSAGE (OUTPATIENT)
Dept: NEPHROLOGY | Facility: CLINIC | Age: 68
End: 2023-06-19
Payer: MEDICARE

## 2023-06-19 NOTE — TELEPHONE ENCOUNTER
Returned patient call, patient stated she just had surgery for a CSF leak and needs to r/s procedures. Pt does not wish to r/s at this time just to cancel. Both procedures canceled and pt informed to call us back when she is ready to schedule. Pt verbalized understanding    Fall

## 2023-06-19 NOTE — TELEPHONE ENCOUNTER
----- Message from Bean Meehan sent at 6/19/2023  9:36 AM CDT -----  Type: Needs Medical Advice  Who Called:  Patient    Best Call Back Number: [phone]  Additional Information: Patient states that she would like to cancel both of her procedures.    Please call to advise

## 2023-06-20 ENCOUNTER — PATIENT OUTREACH (OUTPATIENT)
Dept: ADMINISTRATIVE | Facility: OTHER | Age: 68
End: 2023-06-20
Payer: MEDICARE

## 2023-06-20 NOTE — PROGRESS NOTES
CHW - Outreach Attempt    Community Health Worker left a voicemail message for 1st attempt to contact patient regarding: case management     Community Health Worker to attempt to contact patient on: 6/20/23

## 2023-06-21 NOTE — PROGRESS NOTES
CHW - Outreach Attempt    Community Health Worker left a voicemail message for 2nd attempt to contact patient regarding: case management     Community Health Worker to attempt to contact patient on: 6/21/23

## 2023-06-26 ENCOUNTER — PATIENT OUTREACH (OUTPATIENT)
Dept: ADMINISTRATIVE | Facility: OTHER | Age: 68
End: 2023-06-26
Payer: MEDICARE

## 2023-06-26 NOTE — PROGRESS NOTES
CHW - Outreach Attempt    Community Health Worker left a voicemail message for 3rd attempt to contact patient regarding: community resources    Community Health Worker to attempt to contact patient on: 6/26/23

## 2023-06-27 ENCOUNTER — TELEPHONE (OUTPATIENT)
Dept: HEPATOLOGY | Facility: CLINIC | Age: 68
End: 2023-06-27
Payer: MEDICARE

## 2023-06-27 NOTE — TELEPHONE ENCOUNTER
"Referral to hepatology for elevated liver enzymes during hospitalization for acute illness    Per hepatology notes while inpatient "Since multiorgan involvement (KAROLINA and elevated troponin) suspect ischemia"    Scheduling attempt # 1    Please call pt to schedule appt with ELVIS Bravo (video visit) or any MD      "

## 2023-06-29 NOTE — TELEPHONE ENCOUNTER
Scheduling attempt #3     Patient called to schedule appoint.  No answer, call back  was given to return call.  A letter has been mailed out.

## 2023-07-07 ENCOUNTER — OFFICE VISIT (OUTPATIENT)
Dept: OTOLARYNGOLOGY | Facility: CLINIC | Age: 68
End: 2023-07-07
Payer: MEDICARE

## 2023-07-07 DIAGNOSIS — G96.01 CSF LEAK FROM EAR: Primary | ICD-10-CM

## 2023-07-07 DIAGNOSIS — Q16.5 TEGMEN DEFECT OF BASE OF SKULL: ICD-10-CM

## 2023-07-07 DIAGNOSIS — Q01.8 TEMPORAL ENCEPHALOCELE: ICD-10-CM

## 2023-07-07 PROCEDURE — 1159F MED LIST DOCD IN RCRD: CPT | Mod: CPTII,S$GLB,, | Performed by: OTOLARYNGOLOGY

## 2023-07-07 PROCEDURE — 1126F AMNT PAIN NOTED NONE PRSNT: CPT | Mod: CPTII,S$GLB,, | Performed by: OTOLARYNGOLOGY

## 2023-07-07 PROCEDURE — 1101F PR PT FALLS ASSESS DOC 0-1 FALLS W/OUT INJ PAST YR: ICD-10-PCS | Mod: CPTII,S$GLB,, | Performed by: OTOLARYNGOLOGY

## 2023-07-07 PROCEDURE — 3288F FALL RISK ASSESSMENT DOCD: CPT | Mod: CPTII,S$GLB,, | Performed by: OTOLARYNGOLOGY

## 2023-07-07 PROCEDURE — 1159F PR MEDICATION LIST DOCUMENTED IN MEDICAL RECORD: ICD-10-PCS | Mod: CPTII,S$GLB,, | Performed by: OTOLARYNGOLOGY

## 2023-07-07 PROCEDURE — 99999 PR PBB SHADOW E&M-EST. PATIENT-LVL III: CPT | Mod: PBBFAC,,, | Performed by: OTOLARYNGOLOGY

## 2023-07-07 PROCEDURE — 99999 PR PBB SHADOW E&M-EST. PATIENT-LVL III: ICD-10-PCS | Mod: PBBFAC,,, | Performed by: OTOLARYNGOLOGY

## 2023-07-07 PROCEDURE — 99024 POSTOP FOLLOW-UP VISIT: CPT | Mod: S$GLB,,, | Performed by: OTOLARYNGOLOGY

## 2023-07-07 PROCEDURE — 3044F PR MOST RECENT HEMOGLOBIN A1C LEVEL <7.0%: ICD-10-PCS | Mod: CPTII,S$GLB,, | Performed by: OTOLARYNGOLOGY

## 2023-07-07 PROCEDURE — 3288F PR FALLS RISK ASSESSMENT DOCUMENTED: ICD-10-PCS | Mod: CPTII,S$GLB,, | Performed by: OTOLARYNGOLOGY

## 2023-07-07 PROCEDURE — 99024 PR POST-OP FOLLOW-UP VISIT: ICD-10-PCS | Mod: S$GLB,,, | Performed by: OTOLARYNGOLOGY

## 2023-07-07 PROCEDURE — 1126F PR PAIN SEVERITY QUANTIFIED, NO PAIN PRESENT: ICD-10-PCS | Mod: CPTII,S$GLB,, | Performed by: OTOLARYNGOLOGY

## 2023-07-07 PROCEDURE — 1101F PT FALLS ASSESS-DOCD LE1/YR: CPT | Mod: CPTII,S$GLB,, | Performed by: OTOLARYNGOLOGY

## 2023-07-07 PROCEDURE — 3044F HG A1C LEVEL LT 7.0%: CPT | Mod: CPTII,S$GLB,, | Performed by: OTOLARYNGOLOGY

## 2023-07-07 NOTE — LETTER
July 7, 2023        Rangel Perez, DO  [address]  [facility] [address]             [facility] [address]  [address]  [place] [address]  Phone: [phone]  Fax: [phone]   Patient: Tona Zafar   MR Number: [id number]   YOB: 1955   Date of Visit: 7/7/2023       Dear Dr. Perez:    Thank you for referring Tona Zafar to me for evaluation. Below are the relevant portions of my assessment and plan of care.            If you have questions, please do not hesitate to call me. I look forward to following Tona along with you.    Sincerely,      Gasper Griffith MD           CC    No Recipients        Height 61"; .6#; #; 102%IBW  BMI 20.3  ABW used for calculations as pt between % of IBW. Needs estimated for repletion, recent surgery

## 2023-07-07 NOTE — PROGRESS NOTES
Subjective     Patient ID: Tona Zafar is a 68 y.o. female.    Chief Complaint: Post-op Evaluation    HPI     Tona Zafar is a 68 y.o. female s/p AS temporal encephallocele repair middle fossa approach. Pt reports no issues regarding her surgical site and reports improve hearing and resolution of her nasal drainage.  Unfortunately her post operative course was complicated by an event of AMS in which she was found unresponsive 1 day after discharge from the hospital.  This was thought to be caused by opioid toxicity resulting in ALI, KAROLINA, and elevated troponins.  She was readmitted to [facility] and later discharged on 6/8/23. She was later readmitted to [facility] and discharged after treatment of A-fib.    She reports she is doing much better now and slowly regaining her strength she wants to start exercising again.     Review of Systems   Constitutional:  Negative for chills and fever.   HENT:  Negative for sore throat and trouble swallowing.    Respiratory:  Negative for apnea and chest tightness.    Cardiovascular:  Negative for chest pain.        Objective     Physical Exam  Vitals and nursing note reviewed.   Constitutional:       Appearance: Normal appearance.   HENT:      Head: Normocephalic and atraumatic.      Right Ear: Tympanic membrane, ear canal and external ear normal. There is no impacted cerumen.      Left Ear: Tympanic membrane, ear canal and external ear normal. There is no impacted cerumen.      Ears:      Left Rinne: AC > BC.  Neurological:      Mental Status: She is alert.          Assessment and Plan     1. CSF leak from ear    2. Temporal encephalocele    3. Tegmen defect of base of skull        Surgical site healing well, leak appears resolved  Continue rehab  Ok to slowly reintroduce exercise  F/u 1 mo w/ audiogram  F/u with dr. lopez         No follow-ups on file.

## 2023-07-26 ENCOUNTER — TELEPHONE (OUTPATIENT)
Dept: NEPHROLOGY | Facility: CLINIC | Age: 68
End: 2023-07-26
Payer: MEDICARE

## 2023-07-26 NOTE — TELEPHONE ENCOUNTER
----- Message from Bonny Lu sent at 7/26/2023  9:23 AM CDT -----  Regarding: appt  Type:  Sooner Appointment Request    Caller is requesting a sooner appointment.  Caller declined first available appointment listed below.  Caller will not accept being placed on the waitlist and is requesting a message be sent to doctor.    Name of Caller:  pt  When is the first available appointment?  Dept book  Symptoms:    Best Call Back Number:  [phone]    Additional Information:  pt is looking to reschedule her appt. Please call to discuss.

## 2023-08-01 ENCOUNTER — LAB VISIT (OUTPATIENT)
Dept: LAB | Facility: HOSPITAL | Age: 68
End: 2023-08-01
Attending: INTERNAL MEDICINE
Payer: MEDICARE

## 2023-08-01 ENCOUNTER — OFFICE VISIT (OUTPATIENT)
Dept: NEPHROLOGY | Facility: CLINIC | Age: 68
End: 2023-08-01
Payer: MEDICARE

## 2023-08-01 VITALS — DIASTOLIC BLOOD PRESSURE: 68 MMHG | SYSTOLIC BLOOD PRESSURE: 124 MMHG | HEIGHT: 70 IN | BODY MASS INDEX: 27.39 KG/M2

## 2023-08-01 DIAGNOSIS — E87.6 HYPOKALEMIA: ICD-10-CM

## 2023-08-01 DIAGNOSIS — N17.9 AKI (ACUTE KIDNEY INJURY): ICD-10-CM

## 2023-08-01 DIAGNOSIS — N28.1 KIDNEY CYST, ACQUIRED: ICD-10-CM

## 2023-08-01 DIAGNOSIS — N17.0 ATN (ACUTE TUBULAR NECROSIS): ICD-10-CM

## 2023-08-01 DIAGNOSIS — N17.9 AKI (ACUTE KIDNEY INJURY): Primary | ICD-10-CM

## 2023-08-01 LAB
ALBUMIN SERPL BCP-MCNC: 3.7 G/DL (ref 3.5–5.2)
ANION GAP SERPL CALC-SCNC: 12 MMOL/L (ref 8–16)
BACTERIA #/AREA URNS HPF: NORMAL /HPF
BILIRUB UR QL STRIP: NEGATIVE
BUN SERPL-MCNC: 10 MG/DL (ref 8–23)
CALCIUM SERPL-MCNC: 9.8 MG/DL (ref 8.7–10.5)
CHLORIDE SERPL-SCNC: 106 MMOL/L (ref 95–110)
CLARITY UR: CLEAR
CO2 SERPL-SCNC: 22 MMOL/L (ref 23–29)
COLOR UR: YELLOW
CREAT SERPL-MCNC: 0.8 MG/DL (ref 0.5–1.4)
EST. GFR  (NO RACE VARIABLE): >60 ML/MIN/1.73 M^2
GLUCOSE SERPL-MCNC: 101 MG/DL (ref 70–110)
GLUCOSE UR QL STRIP: NEGATIVE
HGB UR QL STRIP: NEGATIVE
KETONES UR QL STRIP: NEGATIVE
LEUKOCYTE ESTERASE UR QL STRIP: ABNORMAL
MICROSCOPIC COMMENT: NORMAL
NITRITE UR QL STRIP: NEGATIVE
PH UR STRIP: 7 [PH] (ref 5–8)
PHOSPHATE SERPL-MCNC: 3.7 MG/DL (ref 2.7–4.5)
POTASSIUM SERPL-SCNC: 3.4 MMOL/L (ref 3.5–5.1)
PROT UR QL STRIP: NEGATIVE
SODIUM SERPL-SCNC: 140 MMOL/L (ref 136–145)
SP GR UR STRIP: <=1.005 (ref 1–1.03)
SQUAMOUS #/AREA URNS HPF: 1 /HPF
URN SPEC COLLECT METH UR: ABNORMAL
WBC #/AREA URNS HPF: 1 /HPF (ref 0–5)

## 2023-08-01 PROCEDURE — 81000 URINALYSIS NONAUTO W/SCOPE: CPT | Mod: PO | Performed by: INTERNAL MEDICINE

## 2023-08-01 PROCEDURE — 3066F PR DOCUMENTATION OF TREATMENT FOR NEPHROPATHY: ICD-10-PCS | Mod: CPTII,S$GLB,, | Performed by: INTERNAL MEDICINE

## 2023-08-01 PROCEDURE — 3044F PR MOST RECENT HEMOGLOBIN A1C LEVEL <7.0%: ICD-10-PCS | Mod: CPTII,S$GLB,, | Performed by: INTERNAL MEDICINE

## 2023-08-01 PROCEDURE — 1159F PR MEDICATION LIST DOCUMENTED IN MEDICAL RECORD: ICD-10-PCS | Mod: CPTII,S$GLB,, | Performed by: INTERNAL MEDICINE

## 2023-08-01 PROCEDURE — 99214 OFFICE O/P EST MOD 30 MIN: CPT | Mod: S$GLB,,, | Performed by: INTERNAL MEDICINE

## 2023-08-01 PROCEDURE — 3074F SYST BP LT 130 MM HG: CPT | Mod: CPTII,S$GLB,, | Performed by: INTERNAL MEDICINE

## 2023-08-01 PROCEDURE — 3074F PR MOST RECENT SYSTOLIC BLOOD PRESSURE < 130 MM HG: ICD-10-PCS | Mod: CPTII,S$GLB,, | Performed by: INTERNAL MEDICINE

## 2023-08-01 PROCEDURE — 1159F MED LIST DOCD IN RCRD: CPT | Mod: CPTII,S$GLB,, | Performed by: INTERNAL MEDICINE

## 2023-08-01 PROCEDURE — 99999 PR PBB SHADOW E&M-EST. PATIENT-LVL III: CPT | Mod: PBBFAC,,, | Performed by: INTERNAL MEDICINE

## 2023-08-01 PROCEDURE — 99999 PR PBB SHADOW E&M-EST. PATIENT-LVL III: ICD-10-PCS | Mod: PBBFAC,,, | Performed by: INTERNAL MEDICINE

## 2023-08-01 PROCEDURE — 3078F DIAST BP <80 MM HG: CPT | Mod: CPTII,S$GLB,, | Performed by: INTERNAL MEDICINE

## 2023-08-01 PROCEDURE — 3288F FALL RISK ASSESSMENT DOCD: CPT | Mod: CPTII,S$GLB,, | Performed by: INTERNAL MEDICINE

## 2023-08-01 PROCEDURE — 3078F PR MOST RECENT DIASTOLIC BLOOD PRESSURE < 80 MM HG: ICD-10-PCS | Mod: CPTII,S$GLB,, | Performed by: INTERNAL MEDICINE

## 2023-08-01 PROCEDURE — 3008F BODY MASS INDEX DOCD: CPT | Mod: CPTII,S$GLB,, | Performed by: INTERNAL MEDICINE

## 2023-08-01 PROCEDURE — 99214 PR OFFICE/OUTPT VISIT, EST, LEVL IV, 30-39 MIN: ICD-10-PCS | Mod: S$GLB,,, | Performed by: INTERNAL MEDICINE

## 2023-08-01 PROCEDURE — 3288F PR FALLS RISK ASSESSMENT DOCUMENTED: ICD-10-PCS | Mod: CPTII,S$GLB,, | Performed by: INTERNAL MEDICINE

## 2023-08-01 PROCEDURE — 3044F HG A1C LEVEL LT 7.0%: CPT | Mod: CPTII,S$GLB,, | Performed by: INTERNAL MEDICINE

## 2023-08-01 PROCEDURE — 1101F PR PT FALLS ASSESS DOC 0-1 FALLS W/OUT INJ PAST YR: ICD-10-PCS | Mod: CPTII,S$GLB,, | Performed by: INTERNAL MEDICINE

## 2023-08-01 PROCEDURE — 80069 RENAL FUNCTION PANEL: CPT | Performed by: INTERNAL MEDICINE

## 2023-08-01 PROCEDURE — 36415 COLL VENOUS BLD VENIPUNCTURE: CPT | Mod: PO | Performed by: INTERNAL MEDICINE

## 2023-08-01 PROCEDURE — 1101F PT FALLS ASSESS-DOCD LE1/YR: CPT | Mod: CPTII,S$GLB,, | Performed by: INTERNAL MEDICINE

## 2023-08-01 PROCEDURE — 3066F NEPHROPATHY DOC TX: CPT | Mod: CPTII,S$GLB,, | Performed by: INTERNAL MEDICINE

## 2023-08-01 PROCEDURE — 3008F PR BODY MASS INDEX (BMI) DOCUMENTED: ICD-10-PCS | Mod: CPTII,S$GLB,, | Performed by: INTERNAL MEDICINE

## 2023-08-01 NOTE — PROGRESS NOTES
Subjective:       Patient ID: Tona Zafar is a 68 y.o. White female who presents for new patient evaluation for chronic renal failure.    Tona Zafar is referred by Hubert Barrera MD to be evaluated for acute renal failure.  She had a prolonged hospitalization in June where her creatinine went from 0.6 on June 6th to 1.5 on June 8th then 3.1 on June 9th.  She was thought to have ATN and also had acute hepatic injury as well.  She was discharged from [facility] then was admitted to [facility] where she stayed several more days.  She overall is feeling better and she does have some residual L facial droop from her neurosurgery.  She denies any NSAIDs or new medications.        Review of Systems   Constitutional:  Negative for fever.   Respiratory:  Negative for cough and shortness of breath.    Cardiovascular:  Negative for chest pain and leg swelling.   Gastrointestinal:  Positive for nausea (occasional).   Genitourinary:  Negative for difficulty urinating, dysuria and hematuria.   Musculoskeletal:  Positive for back pain.   Neurological:  Positive for light-headedness (since her surgery).   Hematological:  Bruises/bleeds easily.   Psychiatric/Behavioral:  Positive for sleep disturbance.          The past medical, family and social histories were reviewed for this encounter.     Past Medical History:   Diagnosis Date    Abnormal bone density screening     Colon polyps     Degenerative joint disease 02/05/2007    Diverticulosis     Gabapentin overdose 6/9/2023    Gastroparesis 2018    History of chicken pox     History of depression 02/05/2007    Ischemic colitis 2020    Dr. Drew    Lumbar disc disease      Past Surgical History:   Procedure Laterality Date    APPENDECTOMY      AUGMENTATION OF BREAST      BREAST SURGERY Bilateral 2005    [facility]    CHOLECYSTECTOMY  06/2012    COLONOSCOPY  05/12/2020    Dr. Drew, in procedures; repeat in 6 months to check for healing of ischemic colitis    COLONOSCOPY   05/01/2018    Dr. Drew, in procedures    DENTAL SURGERY      DILATION AND CURETTAGE OF UTERUS      HIP SURGERY Right 2007    LUMBAR PUNCTURE N/A 6/5/2023    Procedure: LUMBAR PUNCTURE;  Surgeon: Rangel Perez DO;  Location: [facility] OR 2ND FLR;  Service: Neurosurgery;  Laterality: N/A;    REPAIR, CSF LEAK, CRANIUM, MIDDLE FOSSA APPROACH Left 6/5/2023    Procedure: REPAIR, CSF LEAK, CRANIUM, MIDDLE FOSSA APPROACH;  Surgeon: Gasper Griffith MD;  Location: [facility] OR 2ND FLR;  Service: ENT;  Laterality: Left;    REPAIR, CSF LEAK, CRANIUM, MIDDLE FOSSA APPROACH Left 6/5/2023    Procedure: REPAIR, CSF LEAK, CRANIUM, MIDDLE FOSSA APPROACH;  Surgeon: Rangel Perez DO;  Location: [facility] OR 2ND FLR;  Service: Neurosurgery;  Laterality: Left;    TONSILLECTOMY      UPPER GASTROINTESTINAL ENDOSCOPY  04/24/2018    Dr. Drew, in media    WRIST SURGERY Left 01/2014     Social History     Socioeconomic History    Marital status:    Tobacco Use    Smoking status: Former     Current packs/day: 0.00     Types: Cigarettes    Smokeless tobacco: Never   Substance and Sexual Activity    Alcohol use: Yes     Alcohol/week: 1.0 standard drink of alcohol     Types: 1 Glasses of wine per week     Comment: occasional    Drug use: No    Sexual activity: Yes     Birth control/protection: Post-menopausal     Social Determinants of Health     Financial Resource Strain: Low Risk  (6/5/2023)    Overall Financial Resource Strain (CARDIA)     Difficulty of Paying Living Expenses: Not hard at all   Food Insecurity: No Food Insecurity (6/5/2023)    Hunger Vital Sign     Worried About Running Out of Food in the Last Year: Never true     Ran Out of Food in the Last Year: Never true   Transportation Needs: No Transportation Needs (6/5/2023)    PRAPARE - Transportation     Lack of Transportation (Medical): No     Lack of Transportation (Non-Medical): No   Physical Activity: Sufficiently Active (6/5/2023)    Exercise Vital Sign     Days of Exercise per Week:  5 days     Minutes of Exercise per Session: 60 min   Social Connections: Moderately Isolated (6/5/2023)    Social Connection and Isolation Panel [NHANES]     Frequency of Communication with Friends and Family: Three times a week     Frequency of Social Gatherings with Friends and Family: More than three times a week     Attends [employer] Services: Never     Active Member of Clubs or Organizations: No     Attends Club or Organization Meetings: Never     Marital Status:    Housing Stability: Low Risk  (6/5/2023)    Housing Stability Vital Sign     Unable to Pay for Housing in the Last Year: No     Number of Places Lived in the Last Year: 1     Unstable Housing in the Last Year: No     Current Outpatient Medications   Medication Sig    hydrOXYzine HCL (ATARAX) 25 MG tablet Take 1 tablet (25 mg total) by mouth 3 (three) times daily as needed for Anxiety.    magnesium 200 mg Tab Take 200 mg by mouth once daily.    magnesium hydroxide 400 mg/5 ml (MILK OF MAGNESIA) 400 mg/5 mL Susp Take 30 mLs by mouth daily as needed.    microfibrillar collagen powder Apply 1 g topically as needed.    oxyCODONE-acetaminophen (PERCOCET)  mg per tablet Take 1 tablet by mouth every 6 (six) hours as needed for Pain.    tamsulosin (FLOMAX) 0.4 mg Cap Take 1 capsule (0.4 mg total) by mouth once daily.    tumeric-ging-olive-oreg-capryl 100 mg-150 mg- 50 mg-150 mg Cap Take by mouth.    TURMERIC ORAL Take 1 capsule by mouth once daily.    UNABLE TO FIND Take 1 capsule by mouth once daily. medication name: Beet root    UNABLE TO FIND Take 1 capsule by mouth once daily. medication name: Federica Root    UNABLE TO FIND Take 2 capsules by mouth once daily. medication name: Nitric Oxide    vitamin D3/vitamin K2, MK4, (K2 PLUS D3 ORAL) Take 1 capsule by mouth once daily.    zinc 50 mg Tab Take 50 mg by mouth once daily.     No current facility-administered medications for this visit.     /68 (BP Location: Right arm, Patient Position:  "Sitting, BP Method: Large (Manual))   Ht 5' 10" (1.778 m)   BMI 27.39 kg/m²     Objective:      Physical Exam  Vitals reviewed.   Constitutional:       General: She is not in acute distress.  HENT:      Head: Normocephalic and atraumatic.   Eyes:      General: No scleral icterus.  Cardiovascular:      Rate and Rhythm: Normal rate.      Heart sounds: No murmur heard.  Pulmonary:      Effort: Pulmonary effort is normal. No respiratory distress.   Abdominal:      General: Abdomen is flat.      Palpations: Abdomen is soft.   Musculoskeletal:      Right lower leg: No edema.      Left lower leg: No edema.   Skin:     General: Skin is warm and dry.   Neurological:      Mental Status: She is alert.         Assessment:       1. KAROLINA (acute kidney injury)    2. ATN (acute tubular necrosis)    3. Kidney cyst, acquired        Lab Results   Component Value Date    CREATININE 4.6 (H) 06/15/2023    BUN 34 (H) 06/15/2023     06/15/2023    K 3.3 (L) 06/15/2023     06/15/2023    CO2 22 (L) 06/15/2023     Lab Results   Component Value Date    CALCIUM 8.6 (L) 06/15/2023    CAION 1.17 06/09/2023    PHOS 3.9 06/15/2023     Lab Results   Component Value Date    HCT 30.1 (L) 06/15/2023     Prot/Creat Ratio, Urine   Date Value Ref Range Status   06/13/2023 Unable to calculate 0.00 - 0.20 Final     Plan:   Return to clinic in 4 months.  Labs for next visit include rp.  RP lfts ua micro this week.  We will need to contact her with results.  Baseline creatinine is 0.6.  Renal US shows R 9.4 cm, L 10.3 cm.  Urine sediment was benign in June.  Her last lab was at [facility] on the 27th of June and was down to 1.96.  She seems to have had KAROLINA/ATN in June thus we will reassess by checking her urine and a repeat blood test.    "

## 2023-08-03 ENCOUNTER — TELEPHONE (OUTPATIENT)
Dept: NEPHROLOGY | Facility: CLINIC | Age: 68
End: 2023-08-03

## 2023-08-03 ENCOUNTER — PATIENT MESSAGE (OUTPATIENT)
Dept: NEPHROLOGY | Facility: CLINIC | Age: 68
End: 2023-08-03
Payer: MEDICARE

## 2023-08-03 NOTE — TELEPHONE ENCOUNTER
----- Message from Bean Meehan sent at 8/3/2023 11:36 AM CDT -----  Type: Needs Medical Advice  Who Called:  Gurpreet/ Mario Alberto Zafar     Best Call Back Number: [phone] or [phone]  Additional Information: Caller states that he would like a callback regarding the patient's results.

## 2023-08-03 NOTE — TELEPHONE ENCOUNTER
Spoke to pt. And  - would like  To review tests results and advise - please send a [phone] message they will check chart !

## 2023-08-08 ENCOUNTER — OFFICE VISIT (OUTPATIENT)
Dept: OTOLARYNGOLOGY | Facility: CLINIC | Age: 68
End: 2023-08-08
Payer: MEDICARE

## 2023-08-08 ENCOUNTER — CLINICAL SUPPORT (OUTPATIENT)
Dept: AUDIOLOGY | Facility: CLINIC | Age: 68
End: 2023-08-08
Payer: MEDICARE

## 2023-08-08 DIAGNOSIS — H90.A32 MIXED CONDUCTIVE AND SENSORINEURAL HEARING LOSS, UNILATERAL, LEFT EAR WITH RESTRICTED HEARING ON THE CONTRALATERAL SIDE: ICD-10-CM

## 2023-08-08 DIAGNOSIS — Q01.8 TEMPORAL ENCEPHALOCELE: Primary | ICD-10-CM

## 2023-08-08 DIAGNOSIS — Q01.8 TEMPORAL ENCEPHALOCELE: ICD-10-CM

## 2023-08-08 DIAGNOSIS — H90.A32 MIXED CONDUCTIVE AND SENSORINEURAL HEARING LOSS OF LEFT EAR WITH RESTRICTED HEARING OF RIGHT EAR: Primary | ICD-10-CM

## 2023-08-08 DIAGNOSIS — R42 DIZZINESS: ICD-10-CM

## 2023-08-08 PROCEDURE — 99024 PR POST-OP FOLLOW-UP VISIT: ICD-10-PCS | Mod: S$GLB,,, | Performed by: OTOLARYNGOLOGY

## 2023-08-08 PROCEDURE — 1159F PR MEDICATION LIST DOCUMENTED IN MEDICAL RECORD: ICD-10-PCS | Mod: CPTII,S$GLB,, | Performed by: OTOLARYNGOLOGY

## 2023-08-08 PROCEDURE — 92567 TYMPANOMETRY: CPT | Mod: S$GLB,,,

## 2023-08-08 PROCEDURE — 3044F PR MOST RECENT HEMOGLOBIN A1C LEVEL <7.0%: ICD-10-PCS | Mod: CPTII,S$GLB,, | Performed by: OTOLARYNGOLOGY

## 2023-08-08 PROCEDURE — 3066F NEPHROPATHY DOC TX: CPT | Mod: CPTII,S$GLB,, | Performed by: OTOLARYNGOLOGY

## 2023-08-08 PROCEDURE — 99999 PR PBB SHADOW E&M-EST. PATIENT-LVL III: ICD-10-PCS | Mod: PBBFAC,,, | Performed by: OTOLARYNGOLOGY

## 2023-08-08 PROCEDURE — 1126F AMNT PAIN NOTED NONE PRSNT: CPT | Mod: CPTII,S$GLB,, | Performed by: OTOLARYNGOLOGY

## 2023-08-08 PROCEDURE — 3066F PR DOCUMENTATION OF TREATMENT FOR NEPHROPATHY: ICD-10-PCS | Mod: CPTII,S$GLB,, | Performed by: OTOLARYNGOLOGY

## 2023-08-08 PROCEDURE — 3288F PR FALLS RISK ASSESSMENT DOCUMENTED: ICD-10-PCS | Mod: CPTII,S$GLB,, | Performed by: OTOLARYNGOLOGY

## 2023-08-08 PROCEDURE — 1126F PR PAIN SEVERITY QUANTIFIED, NO PAIN PRESENT: ICD-10-PCS | Mod: CPTII,S$GLB,, | Performed by: OTOLARYNGOLOGY

## 2023-08-08 PROCEDURE — 3044F HG A1C LEVEL LT 7.0%: CPT | Mod: CPTII,S$GLB,, | Performed by: OTOLARYNGOLOGY

## 2023-08-08 PROCEDURE — 92557 COMPREHENSIVE HEARING TEST: CPT | Mod: S$GLB,,,

## 2023-08-08 PROCEDURE — 99024 POSTOP FOLLOW-UP VISIT: CPT | Mod: S$GLB,,, | Performed by: OTOLARYNGOLOGY

## 2023-08-08 PROCEDURE — 92557 PR COMPREHENSIVE HEARING TEST: ICD-10-PCS | Mod: S$GLB,,,

## 2023-08-08 PROCEDURE — 99999 PR PBB SHADOW E&M-EST. PATIENT-LVL III: CPT | Mod: PBBFAC,,, | Performed by: OTOLARYNGOLOGY

## 2023-08-08 PROCEDURE — 1101F PR PT FALLS ASSESS DOC 0-1 FALLS W/OUT INJ PAST YR: ICD-10-PCS | Mod: CPTII,S$GLB,, | Performed by: OTOLARYNGOLOGY

## 2023-08-08 PROCEDURE — 3288F FALL RISK ASSESSMENT DOCD: CPT | Mod: CPTII,S$GLB,, | Performed by: OTOLARYNGOLOGY

## 2023-08-08 PROCEDURE — 1159F MED LIST DOCD IN RCRD: CPT | Mod: CPTII,S$GLB,, | Performed by: OTOLARYNGOLOGY

## 2023-08-08 PROCEDURE — 1101F PT FALLS ASSESS-DOCD LE1/YR: CPT | Mod: CPTII,S$GLB,, | Performed by: OTOLARYNGOLOGY

## 2023-08-08 PROCEDURE — 92567 PR TYMPA2METRY: ICD-10-PCS | Mod: S$GLB,,,

## 2023-08-08 NOTE — LETTER
August 11, 2023        Arden Lim MD  [address]  [facility] [address]             [facility] [address]  [address]  [facility] [address]  Phone: [phone]  Fax: [phone]   Patient: Tona Zafar   MR Number: [id number]   YOB: 1955   Date of Visit: 8/8/2023       Dear Dr. Lim:    Thank you for referring Tona Zafar to me for evaluation. Below are the relevant portions of my assessment and plan of care.            If you have questions, please do not hesitate to call me. I look forward to following Tona along with you.    Sincerely,      Master, MD Gasper           CC    No Recipients

## 2023-08-08 NOTE — PROGRESS NOTES
Tona Zafar, a 68 y.o. female, was seen today in the clinic for an audiologic evaluation.  The patient's main complaint was hearing loss. Recall, Mrs. Zafar underwent a left temporal encephalocele repair on 6/5/2023. Mrs. Zafar reported her hearing in the left ear seems to have improved somewhat since her procedure. She also reports her pulsatile tinnitus and aural pressure/otalgia in the right ear have since stopped. Of note, patient reports she has experienced constant imbalance since her procedure.    Tympanometry revealed Type A in the right ear and Type A in the left ear. Audiogram results revealed normal hearing sensitivity sloping to moderately severe sensorineural hearing loss in the right ear and moderately severe mixed hearing loss through 750 Hz rising to mild mixed hearing loss at 1780-3382 Hz then sloping to severe mixed hearing loss by 4000 Hz in the left ear. Speech reception thresholds were noted at 25 dB in the right ear and 40 dB in the left ear.  Speech discrimination scores were 96% in the right ear and 96% in the left ear.    Recommendations:  Otologic evaluation  Annual audiogram, or sooner if hearing changes are noted.  Hearing protection when in noise  Hearing aid consultation at patient discretion and following otologic management/clearance.

## 2023-08-11 NOTE — PROGRESS NOTES
Subjective     Patient ID: Tona Zafar is a 68 y.o. female.    Chief Complaint: Follow-up    Follow-up  Pertinent negatives include no chest pain, chills, fever or sore throat.        Tona Zafar is a 68 y.o. female s/p AS temporal encephallocele repair middle fossa approach. Pt reports no issues regarding her surgical site and reports improve hearing and resolution of her nasal drainage.  Unfortunately her post operative course was complicated by an event of AMS in which she was found unresponsive 1 day after discharge from the hospital.  This was thought to be caused by opioid toxicity resulting in ALI, KAROLINA, and elevated troponins.  She was readmitted to [facility] and later discharged on 6/8/23. She was later readmitted to [facility] and discharged after treatment of A-fib.    8/8/23:     Here today for follow up.  No acute complaints.  Denies rhinorrhea or pulsatile tinnitus.    Review of Systems   Constitutional:  Negative for chills and fever.   HENT:  Negative for sore throat and trouble swallowing.    Respiratory:  Negative for apnea and chest tightness.    Cardiovascular:  Negative for chest pain.          Objective     Physical Exam  Vitals and nursing note reviewed.   Constitutional:       Appearance: Normal appearance.   HENT:      Head: Normocephalic and atraumatic.      Right Ear: Tympanic membrane, ear canal and external ear normal. There is no impacted cerumen.      Left Ear: Tympanic membrane, ear canal and external ear normal. There is no impacted cerumen.      Ears:      Left Rinne: AC > BC.  Neurological:      Mental Status: She is alert.       Data reviewed:            Assessment and Plan     1. Temporal encephalocele  -     Ambulatory referral/consult to Audiology; Future; Expected date: 08/14/2023    2. Mixed conductive and sensorineural hearing loss, unilateral, left ear with restricted hearing on the contralateral side  -     Ambulatory referral/consult to  Audiology; Future; Expected date: 08/14/2023          Surgical site healing well, leak appears resolved  Still with mixed hearing loss left ear, but asymptomatic   Follow up in 3 months       No follow-ups on file.

## 2023-08-31 NOTE — SUBJECTIVE & OBJECTIVE
Interval History:  Patient stepped down from [facility]. MARGA. KATHERINE. No complaints today besides trouble sleeping. Pain controlled. Neuro stable. Patient unable to void after concepcion removal. Bladder scan today with 700 mL, straight cathed for second time since concepcion removed. Will replace concepcion and keep for 1 week. Patient will f/u with urology at that time. Patient is medically stable for discharge to home with home health. Incision care and activity recommendations reviewed. Plan of care discussed with patient and she voiced understanding. All her questions were answered. Follow-up in Neurosurgery clinic arranged.     Medications:  Continuous Infusions:  Scheduled Meds:   acetaminophen  1,000 mg Oral QID    enoxparin  40 mg Subcutaneous Q24H (prophylaxis, 1700)    gabapentin  300 mg Oral TID    levETIRAcetam  500 mg Oral BID    senna-docusate 8.6-50 mg  1 tablet Oral BID    tamsulosin  0.4 mg Oral Daily     PRN Meds:acetaminophen, hydrOXYzine HCL, magnesium oxide, magnesium oxide, melatonin, morphine, ondansetron, oxyCODONE, potassium bicarbonate, potassium bicarbonate, potassium bicarbonate, potassium, sodium phosphates, potassium, sodium phosphates, potassium, sodium phosphates, prochlorperazine, sodium chloride 0.9%     Review of Systems  Objective:     Weight: 77.8 kg (171 lb 8.3 oz)  Body mass index is 24.61 kg/m².  Vital Signs (Most Recent):  Temp: 98.1 °F (36.7 °C) (06/07/23 0844)  Pulse: 80 (06/07/23 0844)  Resp: 16 (06/07/23 0844)  BP: 120/71 (06/07/23 0844)  SpO2: 95 % (06/07/23 0844) Vital Signs (24h Range):  Temp:  [96.9 °F (36.1 °C)-98.8 °F (37.1 °C)] 98.1 °F (36.7 °C)  Pulse:  [56-93] 80  Resp:  [10-26] 16  SpO2:  [94 %-97 %] 95 %  BP: (101-124)/(53-73) 120/71     Date 06/07/23 0700 - 06/08/23 0659   Shift 3646-3107 5311-0385 2219-5298 24 Hour Total   INTAKE   Shift Total(mL/kg)       OUTPUT   Urine(mL/kg/hr) 700   700   Shift Total(mL/kg) 700(9)   700(9)   Weight (kg) 77.8 77.8 77.8 77.8                        Female External Urinary Catheter 06/06/23 0800 (Active)   Skin no redness;no breakdown 06/06/23 2000   Tolerance no signs/symptoms of discomfort 06/06/23 2000   Suction Continuous suction at 70 mmHg 06/06/23 2000   Date of last wick change 06/06/23 06/06/23 0800   Time of last wick change 0800 06/06/23 0800   Output (mL) 0 mL 06/06/23 1100          Neurosurgery Physical Exam  Constitutional: Well nourished. No distress.   HEENT: L cranial incision c/d/I with staples and suture. No dehiscence or drainage. Some swelling and bruising to periorbital area.  Cardiovascular: Regular rate and rhythm.   Pulm: Aerating well, saturating well.  Abdominal: Soft, NT/ND.  Psych/Behavior: She is alert.   Neuro:  AOx4  PERRL  EOMI  Face Symmetric  Tongue midline  Partial L hearing loss  BUE 5/5  BLE 5/5  No drift    Significant Labs:  Recent Labs   Lab 06/06/23  0128 06/06/23  0301 06/07/23  0300    118* 93    142 140   K 3.2* 3.9 3.8   * 111* 111*   CO2 18* 23 22*   BUN 7* 9 9   CREATININE 0.6 0.8 0.7   CALCIUM 6.2* 8.1* 8.3*   MG 1.5* 1.9 2.0     Recent Labs   Lab 06/06/23  0128 06/06/23  0301 06/07/23  0300   WBC 10.26 12.27 7.89   HGB 9.1* 10.5* 9.0*   HCT 29.1* 33.8* 30.4*    220 161     No results for input(s): LABPT, INR, APTT in the last 48 hours.  Microbiology Results (last 7 days)       ** No results found for the last 168 hours. **          All pertinent labs from the last 24 hours have been reviewed.    Significant Diagnostics:  I have reviewed and interpreted all pertinent imaging results/findings within the past 24 hours.   Protocol For Broad Band Uvb: The patient received Broad Band UVB.

## 2023-09-11 PROBLEM — N17.9 AKI (ACUTE KIDNEY INJURY): Status: RESOLVED | Noted: 2023-06-09 | Resolved: 2023-09-11

## 2023-09-18 PROBLEM — N17.0 ATN (ACUTE TUBULAR NECROSIS): Status: RESOLVED | Noted: 2023-06-14 | Resolved: 2023-09-18

## 2023-10-30 PROBLEM — I48.0 PAF (PAROXYSMAL ATRIAL FIBRILLATION): Status: ACTIVE | Noted: 2023-10-30

## 2024-05-02 ENCOUNTER — LAB VISIT (OUTPATIENT)
Dept: LAB | Facility: HOSPITAL | Age: 69
End: 2024-05-02
Attending: NURSE PRACTITIONER
Payer: MEDICARE

## 2024-05-02 DIAGNOSIS — Z00.00 WELL ADULT EXAM: ICD-10-CM

## 2024-05-02 DIAGNOSIS — E66.3 OVER WEIGHT: ICD-10-CM

## 2024-05-02 DIAGNOSIS — Z79.899 ENCOUNTER FOR LONG-TERM (CURRENT) USE OF OTHER MEDICATIONS: ICD-10-CM

## 2024-05-02 DIAGNOSIS — I25.10 CAD (CORONARY ATHEROSCLEROTIC DISEASE): ICD-10-CM

## 2024-05-02 DIAGNOSIS — I25.2 PERSONAL HISTORY OF MI (MYOCARDIAL INFARCTION): Primary | ICD-10-CM

## 2024-05-02 LAB
25(OH)D3+25(OH)D2 SERPL-MCNC: 34 NG/ML (ref 30–96)
ALBUMIN SERPL BCP-MCNC: 3.6 G/DL (ref 3.5–5.2)
ALBUMIN/CREAT UR: 11.9 UG/MG (ref 0–30)
ALP SERPL-CCNC: 74 U/L (ref 55–135)
ALT SERPL W/O P-5'-P-CCNC: 15 U/L (ref 10–44)
ANION GAP SERPL CALC-SCNC: 13 MMOL/L (ref 8–16)
AST SERPL-CCNC: 20 U/L (ref 10–40)
BACTERIA #/AREA URNS HPF: ABNORMAL /HPF
BASOPHILS # BLD AUTO: 0.04 K/UL (ref 0–0.2)
BASOPHILS NFR BLD: 0.5 % (ref 0–1.9)
BILIRUB SERPL-MCNC: 0.3 MG/DL (ref 0.1–1)
BILIRUB UR QL STRIP: NEGATIVE
BUN SERPL-MCNC: 11 MG/DL (ref 8–23)
CALCIUM SERPL-MCNC: 9.2 MG/DL (ref 8.7–10.5)
CHLORIDE SERPL-SCNC: 109 MMOL/L (ref 95–110)
CHOLEST SERPL-MCNC: 231 MG/DL (ref 120–199)
CHOLEST/HDLC SERPL: 5.1 {RATIO} (ref 2–5)
CLARITY UR: ABNORMAL
CO2 SERPL-SCNC: 18 MMOL/L (ref 23–29)
COLOR UR: YELLOW
CREAT SERPL-MCNC: 1.1 MG/DL (ref 0.5–1.4)
CREAT UR-MCNC: 109 MG/DL (ref 15–325)
DIFFERENTIAL METHOD BLD: ABNORMAL
EOSINOPHIL # BLD AUTO: 0.2 K/UL (ref 0–0.5)
EOSINOPHIL NFR BLD: 2.3 % (ref 0–8)
ERYTHROCYTE [DISTWIDTH] IN BLOOD BY AUTOMATED COUNT: 14.8 % (ref 11.5–14.5)
EST. GFR  (NO RACE VARIABLE): 54.1 ML/MIN/1.73 M^2
GLUCOSE SERPL-MCNC: 83 MG/DL (ref 70–110)
GLUCOSE UR QL STRIP: NEGATIVE
HCT VFR BLD AUTO: 43.2 % (ref 37–48.5)
HDLC SERPL-MCNC: 45 MG/DL (ref 40–75)
HDLC SERPL: 19.5 % (ref 20–50)
HGB BLD-MCNC: 13.8 G/DL (ref 12–16)
HGB UR QL STRIP: ABNORMAL
IMM GRANULOCYTES # BLD AUTO: 0.01 K/UL (ref 0–0.04)
IMM GRANULOCYTES NFR BLD AUTO: 0.1 % (ref 0–0.5)
KETONES UR QL STRIP: NEGATIVE
LDLC SERPL CALC-MCNC: 152.6 MG/DL (ref 63–159)
LEUKOCYTE ESTERASE UR QL STRIP: ABNORMAL
LYMPHOCYTES # BLD AUTO: 2.2 K/UL (ref 1–4.8)
LYMPHOCYTES NFR BLD: 28.1 % (ref 18–48)
MAGNESIUM SERPL-MCNC: 2 MG/DL (ref 1.6–2.6)
MCH RBC QN AUTO: 27.9 PG (ref 27–31)
MCHC RBC AUTO-ENTMCNC: 31.9 G/DL (ref 32–36)
MCV RBC AUTO: 87 FL (ref 82–98)
MICROALBUMIN UR DL<=1MG/L-MCNC: 13 UG/ML
MICROSCOPIC COMMENT: ABNORMAL
MONOCYTES # BLD AUTO: 0.3 K/UL (ref 0.3–1)
MONOCYTES NFR BLD: 4.2 % (ref 4–15)
NEUTROPHILS # BLD AUTO: 5 K/UL (ref 1.8–7.7)
NEUTROPHILS NFR BLD: 64.8 % (ref 38–73)
NITRITE UR QL STRIP: POSITIVE
NONHDLC SERPL-MCNC: 186 MG/DL
NRBC BLD-RTO: 0 /100 WBC
PH UR STRIP: 6 [PH] (ref 5–8)
PLATELET # BLD AUTO: 319 K/UL (ref 150–450)
PMV BLD AUTO: 10.6 FL (ref 9.2–12.9)
POTASSIUM SERPL-SCNC: 4.8 MMOL/L (ref 3.5–5.1)
PROT SERPL-MCNC: 7.3 G/DL (ref 6–8.4)
PROT UR QL STRIP: NEGATIVE
RBC # BLD AUTO: 4.94 M/UL (ref 4–5.4)
RBC #/AREA URNS HPF: 5 /HPF (ref 0–4)
SODIUM SERPL-SCNC: 140 MMOL/L (ref 136–145)
SP GR UR STRIP: 1.02 (ref 1–1.03)
SQUAMOUS #/AREA URNS HPF: 2 /HPF
TRIGL SERPL-MCNC: 167 MG/DL (ref 30–150)
TSH SERPL DL<=0.005 MIU/L-ACNC: 1.39 UIU/ML (ref 0.4–4)
URN SPEC COLLECT METH UR: ABNORMAL
WBC # BLD AUTO: 7.7 K/UL (ref 3.9–12.7)
WBC #/AREA URNS HPF: 60 /HPF (ref 0–5)

## 2024-05-02 PROCEDURE — 81000 URINALYSIS NONAUTO W/SCOPE: CPT | Mod: PO | Performed by: NURSE PRACTITIONER

## 2024-05-02 PROCEDURE — 82043 UR ALBUMIN QUANTITATIVE: CPT | Performed by: NURSE PRACTITIONER

## 2024-05-02 PROCEDURE — 80053 COMPREHEN METABOLIC PANEL: CPT | Performed by: NURSE PRACTITIONER

## 2024-05-02 PROCEDURE — 36415 COLL VENOUS BLD VENIPUNCTURE: CPT | Mod: PO | Performed by: NURSE PRACTITIONER

## 2024-05-02 PROCEDURE — 82306 VITAMIN D 25 HYDROXY: CPT | Performed by: NURSE PRACTITIONER

## 2024-05-02 PROCEDURE — 83735 ASSAY OF MAGNESIUM: CPT | Performed by: NURSE PRACTITIONER

## 2024-05-02 PROCEDURE — 84443 ASSAY THYROID STIM HORMONE: CPT | Performed by: NURSE PRACTITIONER

## 2024-05-02 PROCEDURE — 85025 COMPLETE CBC W/AUTO DIFF WBC: CPT | Performed by: NURSE PRACTITIONER

## 2024-05-02 PROCEDURE — 80061 LIPID PANEL: CPT | Performed by: NURSE PRACTITIONER

## 2025-06-26 ENCOUNTER — TELEPHONE (OUTPATIENT)
Dept: UROLOGY | Facility: CLINIC | Age: 70
End: 2025-06-26
Payer: COMMERCIAL

## 2025-06-26 ENCOUNTER — TELEPHONE (OUTPATIENT)
Dept: NEPHROLOGY | Facility: CLINIC | Age: 70
End: 2025-06-26
Payer: COMMERCIAL

## 2025-06-26 NOTE — TELEPHONE ENCOUNTER
Copied from CRM #[id number]. Topic: Appointments - Appointment Scheduling  >> Jun 26, 2025 10:11 AM David wrote:  Type:  Appointment Request    Caller is requesting a sooner appointment.  Caller declined first available appointment listed below.  Caller will not accept being placed on the waitlist and is requesting a message be sent to doctor.  Name of Caller:Pt  When is the first available appointment? Dtree said she msg  Symptoms:Kidney  Would the patient rather a call back or a response via [facility]? call  Best Call Back Number:[phone] or [phone]  Additional Information: New pt needs appt please advise

## (undated) DEVICE — BLADE TONGUE DEPRESSOR STRL

## (undated) DEVICE — ELECTRODE REM PLYHSV RETURN 9

## (undated) DEVICE — DRAPE STERI INSTRUMENT 1018

## (undated) DEVICE — MARKER SKIN STND TIP BLUE BARR

## (undated) DEVICE — BIT DRILL WIRE PASS 1.0MM

## (undated) DEVICE — SEALANT ADHERUS AUTOSPRY DURAL

## (undated) DEVICE — DRAPE CRANIOTOMY T SURG STRL

## (undated) DEVICE — STRIP MEDI WND CLSR 1/2X4IN

## (undated) DEVICE — CARTRIDGE OIL

## (undated) DEVICE — PINS SKULL ADULT MAYFIELD
Type: IMPLANTABLE DEVICE | Site: CRANIAL | Status: NON-FUNCTIONAL
Removed: 2023-06-05

## (undated) DEVICE — CORD BIPOLAR 12 FOOT

## (undated) DEVICE — DIFFUSER

## (undated) DEVICE — KIT DRESS GLASSCK EAR ADLT ST

## (undated) DEVICE — DURAPREP SURG SCRUB 26ML

## (undated) DEVICE — KIT SURGIFLO HEMOSTATIC MATRIX

## (undated) DEVICE — KIT EXTERNAL DRAIN(CRAINIAL)

## (undated) DEVICE — CATH IV INTROCAN 14G X 2.

## (undated) DEVICE — BANDAGE ROLL COTTN 4.5INX4.1YD

## (undated) DEVICE — ELECTRODE NDL

## (undated) DEVICE — TRAY CATH FOL SIL URIMTR 16FR

## (undated) DEVICE — SOL IRR NACL .9% 3000ML

## (undated) DEVICE — CATH EDM LUMBAR CLOSED TIP 80

## (undated) DEVICE — DRAPE HALF SURGICAL 40X58IN

## (undated) DEVICE — BLADE SCALP BEAVER 2.5MM ANG

## (undated) DEVICE — ELECTRODE BLADE INSULATED 1 IN

## (undated) DEVICE — SYR 30CC LUER LOCK

## (undated) DEVICE — SPONGE COTTON TRAY 4X4IN

## (undated) DEVICE — KIT EAR EAOFE OMC

## (undated) DEVICE — BLADE SCALP OPTHL NDL TIP 3MM

## (undated) DEVICE — CLIPPER BLADE MOD 4406 (CAREF)

## (undated) DEVICE — SUT 3/0 18IN COATED VICRYLP

## (undated) DEVICE — TUBE FRAZIER 5MM 2FT SOFT TIP

## (undated) DEVICE — SUT BONE WAX 2.5 GRMS 12/BX

## (undated) DEVICE — KIT SUCTION IRRIGATION

## (undated) DEVICE — BLADE SURG CARBON STEEL SZ11

## (undated) DEVICE — STAPLER SKIN PROXIMATE WIDE

## (undated) DEVICE — TOWEL OR DISP STRL BLUE 4/PK

## (undated) DEVICE — HEMOSTAT SURGICEL 4X8IN

## (undated) DEVICE — DRESSING TRANS 6X8 TEGADERM

## (undated) DEVICE — GAUZE FLUFF XXLG 36X36 2 PLY

## (undated) DEVICE — DRAPE CORETEMP FLD WRM 56X62IN

## (undated) DEVICE — BLADE OTOLOGY BEAVER 5X84MM

## (undated) DEVICE — BLADE SCALP OPHTL RND TIP

## (undated) DEVICE — PAD CURAD NONADH 3X4IN

## (undated) DEVICE — BURR STEEL ROUND E9000 2X48MM

## (undated) DEVICE — ROUTER TAPERED 2.3MM

## (undated) DEVICE — SLEEVE ECLIPSE GOWN STRL 23IN

## (undated) DEVICE — SUT 4/0 18IN NUROLON BLK B

## (undated) DEVICE — BIT PERFORATOR LARGE

## (undated) DEVICE — BURR ROUND DMND CRS ELT 4.0MM

## (undated) DEVICE — SUCTION SURGICAL FRAZR